# Patient Record
Sex: FEMALE | Race: WHITE | Employment: FULL TIME | URBAN - METROPOLITAN AREA
[De-identification: names, ages, dates, MRNs, and addresses within clinical notes are randomized per-mention and may not be internally consistent; named-entity substitution may affect disease eponyms.]

---

## 2017-01-02 DIAGNOSIS — I10 HYPERTENSION GOAL BP (BLOOD PRESSURE) < 140/90: ICD-10-CM

## 2017-01-02 DIAGNOSIS — Z11.59 NEED FOR HEPATITIS C SCREENING TEST: ICD-10-CM

## 2017-01-02 DIAGNOSIS — E78.5 HYPERLIPIDEMIA LDL GOAL <160: ICD-10-CM

## 2017-01-02 LAB
ANION GAP SERPL CALCULATED.3IONS-SCNC: 10 MMOL/L (ref 3–14)
BUN SERPL-MCNC: 12 MG/DL (ref 7–30)
CALCIUM SERPL-MCNC: 9.6 MG/DL (ref 8.5–10.1)
CHLORIDE SERPL-SCNC: 101 MMOL/L (ref 94–109)
CHOLEST SERPL-MCNC: 275 MG/DL
CO2 SERPL-SCNC: 28 MMOL/L (ref 20–32)
CREAT SERPL-MCNC: 0.77 MG/DL (ref 0.52–1.04)
GFR SERPL CREATININE-BSD FRML MDRD: 79 ML/MIN/1.7M2
GLUCOSE SERPL-MCNC: 125 MG/DL (ref 70–99)
HCV AB SERPL QL IA: NORMAL
HDLC SERPL-MCNC: 75 MG/DL
LDLC SERPL CALC-MCNC: 146 MG/DL
NONHDLC SERPL-MCNC: 200 MG/DL
POTASSIUM SERPL-SCNC: 3.8 MMOL/L (ref 3.4–5.3)
SODIUM SERPL-SCNC: 139 MMOL/L (ref 133–144)
TRIGL SERPL-MCNC: 272 MG/DL

## 2017-01-02 PROCEDURE — 80048 BASIC METABOLIC PNL TOTAL CA: CPT | Performed by: FAMILY MEDICINE

## 2017-01-02 PROCEDURE — 86803 HEPATITIS C AB TEST: CPT | Mod: 90 | Performed by: FAMILY MEDICINE

## 2017-01-02 PROCEDURE — 36415 COLL VENOUS BLD VENIPUNCTURE: CPT | Performed by: FAMILY MEDICINE

## 2017-01-02 PROCEDURE — 99000 SPECIMEN HANDLING OFFICE-LAB: CPT | Performed by: FAMILY MEDICINE

## 2017-01-02 PROCEDURE — 80061 LIPID PANEL: CPT | Performed by: FAMILY MEDICINE

## 2017-01-09 ENCOUNTER — OFFICE VISIT (OUTPATIENT)
Dept: FAMILY MEDICINE | Facility: CLINIC | Age: 52
End: 2017-01-09
Payer: COMMERCIAL

## 2017-01-09 ENCOUNTER — TELEPHONE (OUTPATIENT)
Dept: FAMILY MEDICINE | Facility: CLINIC | Age: 52
End: 2017-01-09

## 2017-01-09 VITALS
WEIGHT: 226 LBS | BODY MASS INDEX: 34.25 KG/M2 | DIASTOLIC BLOOD PRESSURE: 80 MMHG | SYSTOLIC BLOOD PRESSURE: 130 MMHG | HEIGHT: 68 IN | HEART RATE: 79 BPM | TEMPERATURE: 97.2 F | OXYGEN SATURATION: 95 %

## 2017-01-09 DIAGNOSIS — Z00.00 ROUTINE GENERAL MEDICAL EXAMINATION AT A HEALTH CARE FACILITY: Primary | ICD-10-CM

## 2017-01-09 DIAGNOSIS — R93.89 ENDOMETRIAL THICKENING ON ULTRA SOUND: ICD-10-CM

## 2017-01-09 DIAGNOSIS — R74.8 ELEVATED LIVER ENZYMES: ICD-10-CM

## 2017-01-09 DIAGNOSIS — J45.30 MILD PERSISTENT ASTHMA WITHOUT COMPLICATION: ICD-10-CM

## 2017-01-09 DIAGNOSIS — I10 HYPERTENSION GOAL BP (BLOOD PRESSURE) < 140/90: ICD-10-CM

## 2017-01-09 DIAGNOSIS — Z12.11 SCREEN FOR COLON CANCER: Primary | ICD-10-CM

## 2017-01-09 PROCEDURE — 99396 PREV VISIT EST AGE 40-64: CPT | Performed by: FAMILY MEDICINE

## 2017-01-09 RX ORDER — AMLODIPINE BESYLATE 5 MG/1
5 TABLET ORAL DAILY
Qty: 90 TABLET | Refills: 1 | Status: SHIPPED | OUTPATIENT
Start: 2017-01-09 | End: 2017-07-08

## 2017-01-09 RX ORDER — ATENOLOL AND CHLORTHALIDONE TABLET 50; 25 MG/1; MG/1
1 TABLET ORAL DAILY
Qty: 90 TABLET | Refills: 1 | Status: SHIPPED | OUTPATIENT
Start: 2017-01-09 | End: 2017-07-08

## 2017-01-09 NOTE — PATIENT INSTRUCTIONS
1. I recommend including veggies, fresh fruits (3 to 5 servings), nuts (unsalted) in your daily diet. Cut down on carbs like bread, pasta, rice, potatoes. Cut down on red meats like burgers etc. Increase healthy proteins like beans, tofu, tuna, chicken and turkey.    2. Get regular exercise like jogging, biking, swimming at least 3 times per week.      3. Do self breast exams monthly. Report any lumps.    4. Avoid the sun or otherwise use sun screen - please look at the insert I gave you about melanoma.    5. See the dentist and eye doctor regularly.     6. Let's combine the Atenolol and HCTZ into one pill. Increase the Amlodipine to 5 mg daily.    7. Make a lab only appointment for liver enzymes - otherwise we can do this in 6 months when you come in for a follow-up with pre-visit labs.

## 2017-01-09 NOTE — NURSING NOTE
"Chief Complaint   Patient presents with     Physical       Initial /94 mmHg  Pulse 79  Temp(Src) 97.2  F (36.2  C) (Oral)  Ht 5' 7.75\" (1.721 m)  Wt 226 lb (102.513 kg)  BMI 34.61 kg/m2  SpO2 95% Estimated body mass index is 34.61 kg/(m^2) as calculated from the following:    Height as of this encounter: 5' 7.75\" (1.721 m).    Weight as of this encounter: 226 lb (102.513 kg)..  BP completed using cuff size: deondre [place] FÉLIX    "

## 2017-01-09 NOTE — PROGRESS NOTES
SUBJECTIVE:     CC: Pam Mora is an 51 year old woman who presents for preventive health visit.     Abdomina bloating, elevated liver enzymes due to fatty liver - this is chronic. She denies pain but there is discomfort. Fullness after eating small amounts. She sometimes has diarrhea mild. But generally normal BM's 1-2 per day. No nausea or vomiting. No fevers or weight loss.   Evaluation and treatment:   ultrasound 6/5/14 showing fatty liver and also two spots on the liver.    MRI on 6/11/14 showed those spots to be sparing of fatty liver.    Another ultrasound 11/7/14 to evaluate the chest pain and elevated liver enzymes, similar findings.   Referred previously to GI. She feels she did not get benefit from that.    EGD and colonoscopy ordered but she never got it done.    Thickened endometrium and ovarian cyst -   Evaluation and treatment:   U/S as below on 6/5/14   Endometrial bx as below 7/16/14   Saw GYN on 10/13/14 but only hot flushes discussed       PELVIC ULTRASOUND COMPLETE WITH TRANSVAGINAL IMAGING 6/5/2014, 9:57 AM  HISTORY: Flatulence, eructation, and gas pain.  COMPARISON: None.  FINDINGS: Transabdominal and transvaginal imaging was performed.  Transvaginal imaging was performed to better visualize the endometrium  and adnexa. Uterus is retroverted and normal in size measuring 9.6 x  6.8 x 5.7 cm. The endometrium measures 1.2 cm in thickness. The right  ovary is normal in size and appearance. There is a cyst in the left  ovary measuring 4.0 x 3.4 x 3.2 cm. Color Doppler and Doppler waveform  analysis of the ovary shows blood flow bilaterally. No torsion. There  is small amount of free fluid in the pelvis.  IMPRESSION  IMPRESSION:  1. The endometrium is abnormally thickened for a postmenopausal woman  measuring 1.2 cm.   2. There is a 4.0 cm simple cyst in the left ovary, abnormal in a  postmenopausal woman. Followup in 3-6 months recommended.  FELICIA ASENCIO MD    Patient Name: SARAH  SANDRA YANCEY   MR#: [id number]   Specimen #: [id number]   Collected: 7/16/2014   Received: 7/16/2014   Reported: 7/17/2014 06:32   Ordering Phy(s): ENDER POLANCO               SPECIMEN(S):   Endometrial biopsy     FINAL DIAGNOSIS:   Endometrial biopsy:         - Inactive atrophic endometrium with stromal breakdown (see   comment)     COMMENT:   Although there is no evidence of hyperplasia in the planes examined,   there are no good-sized tissue fragments with intact glands and stroma   to adequately assess for hyperplasia.  There is no evidence of atypia or   malignancy.  Should the bleeding persist, especially if the patient has   risk factors for hyperplasia, repeat biopsy may be recommended.     Chest pain - I had ordered stress test but since the pain resolved she did not set it up.    HTN - not checking at home. Controlled in clinic.  Treatment:   Atenolol 50 mg qd   Lisinopril caused cough   HCTZ 25 mg qd.    Norvasc 5 mg qd    Last Basic Metabolic Panel:  NA      139   1/2/2017   POTASSIUM      3.8   1/2/2017  CHLORIDE      101   1/2/2017  BRIELLE      9.6   1/2/2017  CO2       28   1/2/2017  BUN       12   1/2/2017  CR     0.77   1/2/2017  GLC      125   1/2/2017    CBC RESULTS:   Recent Labs   Lab Test  11/20/15   0855   WBC  4.7   RBC  4.62   HGB  14.5   HCT  43.2   MCV  94   MCH  31.4   MCHC  33.6   RDW  12.9   PLT  218     Dyslipidemia - No h/o CAD, CVA, PAD or diabetes.  Treatment:   Stopped Zocor for no specicif reason but based on ATP4, statin not needed anyway.    Recent Labs   Lab Test  01/02/17   0745  11/20/15   0855  10/22/14   0850  01/13/14   0851   CHOL  275*  216*  215*  252*   HDL  75  73  73  66   LDL  146*  114*  112  168*   TRIG  272*  144  151*  88   CHOLHDLRATIO   --    --   2.9  3.8     The 10-year ASCVD risk score (Van CRAMER Jr., et al., 2013) is: 2.4%    Values used to calculate the score:      Age: 51 years      Sex: Female      Is an : No      Diabetic: No      Tobacco  smoker: No      Systolic Blood Pressure: 143 mmHg      Prescribed Antihypertensives: Yes      HDL Cholesterol: 75 mg/dL      Total Cholesterol: 275 mg/dL       Obesity - has seen nutritionist previously. Has previously  followed with obesity clinic. Tries to eat healthy.   TSH     Date   Value   Range   Status     5/15/2014   2.95    0.4 - 5.0 mU/L   Final       Wt Readings from Last 5 Encounters:   01/09/17 226 lb (102.513 kg)   04/07/16 240 lb 9.6 oz (109.135 kg)   11/20/15 240 lb (108.863 kg)   07/07/15 239 lb (108.41 kg)   04/22/15 237 lb (107.502 kg)     Mild persistent asthma - Previously on Flovent. Now stable on Alb prn.     ACT Total Scores 1/9/2017   ACT TOTAL SCORE -   ASTHMA ER VISITS -   ASTHMA HOSPITALIZATIONS -   ACT TOTAL SCORE (Goal Greater than or Equal to 20) 22   In the past 12 months, how many times did you visit the emergency room for your asthma without being admitted to the hospital? 0   In the past 12 months, how many times were you hospitalized overnight because of your asthma? 0     Migraines - has not had a flare since 2013. Previously on Topamax but not any more.      Lung nodule - Had 3 mm lung nodule in 2007. She denies cough, shortness of breath or other respiratory symptoms. She has previous h/o smoking. Follow up CT as below - no further evaluation needed.  CT 11/5/13  IMPRESSION:   1. The indeterminate left lung base nodule is redemonstrated on image  39. Currently measured as 0.4 cm. This is within the range of accurate  caliper placement. There is no significant interval change seen.  2. Small calcification along the anterior left lobe of thyroid.  3. No acute pulmonary disease.    Thyroid calcification - as above on CT noted incidentally. TSH 4/25/13 normal. Exam is normal today. U/S showed thyroid nodule. She saw endocrine and was told the bx was normal.    Skin lumps on abd -     ULTRASOUND ABDOMEN LIMITED July 10, 2015 8:31 AM  HISTORY: Palpable subcutaneous masses in the  right abdomen.  FINDINGS: Ultrasound was targeted to the palpable masses in the right  upper quadrant. Each of these correlates with a superficial  circumscribed hyperechoic nodule. No internal vascularity is  demonstrated. The first measures 1.4 x 1.8 x 1.2 cm. The second  measures 1.5 x 1.9 x 0.9 cm.   IMPRESSION  IMPRESSION: Findings suggest that the palpable nodules represent  lipomas.  CECI HOLLY MD    Tobacco abuse - Has 23 pack history of smoking. Quit in 2007.     Preventive -   Mammogram 3/18/16 neg.   Declines Tdap, Prevnar, Pneumovax and flu shot.  PAP - declines  Hep C screen: negative 1/2/17    SH:    Marital status:   Kids: 3  Employment: with [employer]  Exercise: 21 day fix, using video tapes, cardio and weight, 30 min per day, until Nov, Since then walking  Tobacco: per HPI  Etoh: 2-3 per week, 2 at a time  Recreational drugs: no  Caffeine: coffee one cup per day    FH:    High cholesterol dad.       Today's PHQ-2 Score:   PHQ-2 ( 1999 Pfizer) 1/9/2017 4/7/2016   Q1: Little interest or pleasure in doing things 0 0   Q2: Feeling down, depressed or hopeless 0 0   PHQ-2 Score 0 0   Little interest or pleasure in doing things - -   Feeling down, depressed or hopeless - -   PHQ-2 Score - -       Abuse: Current or Past(Physical, Sexual or Emotional)- No  Do you feel safe in your environment - Yes    Social History   Substance Use Topics     Smoking status: Former Smoker     Quit date: 01/06/2008     Smokeless tobacco: Never Used     Alcohol Use: Yes      Comment: 3 drinks a week     The patient does not drink >3 drinks per day nor >7 drinks per week.    Recent Labs   Lab Test  01/02/17   0745  11/20/15   0855  10/22/14   0850  01/13/14   0851   CHOL  275*  216*  215*  252*   HDL  75  73  73  66   LDL  146*  114*  112  168*   TRIG  272*  144  151*  88   CHOLHDLRATIO   --    --   2.9  3.8   NHDL  200*  143*   --    --        Reviewed orders with patient.  Reviewed health maintenance and  "updated orders accordingly - Yes    ROS:  C: NEGATIVE for fever, chills, change in weight  I: NEGATIVE for worrisome rashes, moles or lesions  E: NEGATIVE for vision changes or irritation  ENT: NEGATIVE for ear, mouth and throat problems  R: NEGATIVE for significant cough or SOB  B: NEGATIVE for masses, tenderness or discharge  CV: NEGATIVE for chest pain, palpitations or peripheral edema  GI: NEGATIVE for nausea, abdominal pain, heartburn, or change in bowel habits  : NEGATIVE for unusual urinary or vaginal symptoms. No vaginal bleeding.  M: NEGATIVE for significant arthralgias or myalgia  N: NEGATIVE for weakness, dizziness or paresthesias  P: NEGATIVE for changes in mood or affect       OBJECTIVE:     /80 mmHg  Pulse 79  Temp(Src) 97.2  F (36.2  C) (Oral)  Ht 5' 7.75\" (1.721 m)  Wt 226 lb (102.513 kg)  BMI 34.61 kg/m2  SpO2 95%  EXAM:  GENERAL: healthy, alert and no distress  EYES: Eyes grossly normal to inspection, PERRL and conjunctivae and sclerae normal  HENT: ear canals and TM's normal, nose and mouth without ulcers or lesions  NECK: no adenopathy, no asymmetry, masses, or scars and thyroid normal to palpation  RESP: lungs clear to auscultation - no rales, rhonchi or wheezes  BREAST: declines  CV: regular rate and rhythm, normal S1 S2, no S3 or S4, no murmur, click or rub, no peripheral edema and peripheral pulses strong  ABDOMEN: soft, nontender, no hepatosplenomegaly, no masses and bowel sounds normal   (female): declines  MS: no gross musculoskeletal defects noted, no edema  SKIN: elected to remain fully clothed. The exposed areas were normal.  NEURO: Normal strength and tone, mentation intact and speech normal  PSYCH: mentation appears normal, affect normal/bright    ASSESSMENT/PLAN:       COUNSELING:   Reviewed preventive health counseling, as reflected in patient instructions       Regular exercise       Healthy diet/nutrition     reports that she quit smoking about 9 years ago. She has " "never used smokeless tobacco.    Estimated body mass index is 37.24 kg/(m^2) as calculated from the following:    Height as of 4/7/16: 5' 7.42\" (1.712 m).    Weight as of 4/7/16: 240 lb 9.6 oz (109.135 kg).   Weight management plan: Discussed healthy diet and exercise guidelines and patient will follow up in 12 months in clinic to re-evaluate.    Assessment and Plan - Decision Making    1. Routine general medical examination at a health care facility    Results of today's visit given to patient verbally and in writing. Various preventive issues discussed as below. Patient verbalized understanding.    - vaccine update: declines  - STD screen: declines  - Mammogram: 3/8/16 negative  - PAP: declines  - Colon CA screen: Colonoscopy ordered today    - Hep C screen: negative 1/2/17    - Lung cancer screen: does not qualify      2. Hypertension goal BP (blood pressure) < 140/90  Controlled. Change Atenolol and HCTZ to Atenolol/chlorthalidone. Continue Norvasc.   - atenolol-chlorthalidone (TENORETIC 50) 50-25 MG per tablet; Take 1 tablet by mouth daily  Dispense: 90 tablet; Refill: 1  - amLODIPine (NORVASC) 5 MG tablet; Take 1 tablet (5 mg) by mouth daily  Dispense: 90 tablet; Refill: 1    3. Elevated liver enzymes  Related to fatty liver. She wanted repeat LFT's. She will set that up.  - Hepatic panel (Albumin, ALT, AST, Bili, Alk Phos, TP); Future    4. Mild persistent asthma without complication  Controlled on Alb prn.  - Asthma Action Plan (AAP)      Written instructions given as follows:    Patient Instructions   1. I recommend including veggies, fresh fruits (3 to 5 servings), nuts (unsalted) in your daily diet. Cut down on carbs like bread, pasta, rice, potatoes. Cut down on red meats like burgers etc. Increase healthy proteins like beans, tofu, tuna, chicken and turkey.    2. Get regular exercise like jogging, biking, swimming at least 3 times per week.      3. Do self breast exams monthly. Report any lumps.    4. " Avoid the sun or otherwise use sun screen - please look at the insert I gave you about melanoma.    5. See the dentist and eye doctor regularly.     6. Let's combine the Atenolol and HCTZ into one pill. Increase the Amlodipine to 5 mg daily.    7. Make a lab only appointment for liver enzymes - otherwise we can do this in 6 months when you come in for a follow-up with pre-visit labs.

## 2017-01-09 NOTE — MR AVS SNAPSHOT
After Visit Summary   1/9/2017    Pam Mora    MRN: [id number]           Patient Information     Date Of Birth          1965        Visit Information        Provider Department      1/9/2017 7:30 AM Franki Eason MD [facility]        Today's Diagnoses     Hypertension goal BP (blood pressure) < 140/90    -  1     Elevated liver enzymes           Care Instructions    1. I recommend including veggies, fresh fruits (3 to 5 servings), nuts (unsalted) in your daily diet. Cut down on carbs like bread, pasta, rice, potatoes. Cut down on red meats like burgers etc. Increase healthy proteins like beans, tofu, tuna, chicken and turkey.    2. Get regular exercise like jogging, biking, swimming at least 3 times per week.      3. Do self breast exams monthly. Report any lumps.    4. Avoid the sun or otherwise use sun screen - please look at the insert I gave you about melanoma.    5. See the dentist and eye doctor regularly.     6. Let's combine the Atenolol and HCTZ into one pill. Increase the Amlodipine to 5 mg daily.    7. Make a lab only appointment for liver enzymes - otherwise we can do this in 6 months when you come in for a follow-up with pre-visit labs.                Follow-ups after your visit        Future tests that were ordered for you today     Open Future Orders        Priority Expected Expires Ordered    Hepatic panel (Albumin, ALT, AST, Bili, Alk Phos, TP) Routine  1/9/2018 1/9/2017            Who to contact     If you have questions or need follow up information about today's clinic visit or your schedule please contact [facility] directly at [phone].  Normal or non-critical lab and imaging results will be communicated to you by MyChart, letter or phone within 4 business days after the clinic has received the results. If you do not hear from us within 7 days, please contact the clinic through MyChart or phone. If you have a critical or abnormal  "lab result, we will notify you by phone as soon as possible.  Submit refill requests through [employer] or call your pharmacy and they will forward the refill request to us. Please allow 3 business days for your refill to be completed.          Additional Information About Your Visit        [employer] Information     [employer] gives you secure access to your electronic health record. If you see a primary care provider, you can also send messages to your care team and make appointments. If you have questions, please call your primary care clinic.  If you do not have a primary care provider, please call [phone] and they will assist you.        Care EveryWhere ID     This is your Care EveryWhere ID. This could be used by other organizations to access your [place] medical records  [id number]        Your Vitals Were     Pulse Temperature Height BMI (Body Mass Index) Pulse Oximetry       79 97.2  F (36.2  C) (Oral) 5' 7.75\" (1.721 m) 34.61 kg/m2 95%        Blood Pressure from Last 3 Encounters:   01/09/17 130/80   04/07/16 130/84   11/20/15 136/88    Weight from Last 3 Encounters:   01/09/17 226 lb (102.513 kg)   04/07/16 240 lb 9.6 oz (109.135 kg)   11/20/15 240 lb (108.863 kg)                 Today's Medication Changes          These changes are accurate as of: 1/9/17  8:10 AM.  If you have any questions, ask your nurse or doctor.               Start taking these medicines.        Dose/Directions    atenolol-chlorthalidone 50-25 MG per tablet   Commonly known as:  TENORETIC 50   Used for:  Hypertension goal BP (blood pressure) < 140/90   Started by:  Franki Eason MD        Dose:  1 tablet   Take 1 tablet by mouth daily   Quantity:  90 tablet   Refills:  1         These medicines have changed or have updated prescriptions.        Dose/Directions    amLODIPine 5 MG tablet   Commonly known as:  NORVASC   This may have changed:    - medication strength  - how much to take   Used for:  Hypertension goal BP (blood " pressure) < 140/90   Changed by:  Franki Eason MD        Dose:  5 mg   Take 1 tablet (5 mg) by mouth daily   Quantity:  90 tablet   Refills:  1         Stop taking these medicines if you haven't already. Please contact your care team if you have questions.     atenolol 50 MG tablet   Commonly known as:  TENORMIN   Stopped by:  Franki Eason MD           hydrochlorothiazide 25 MG tablet   Commonly known as:  HYDRODIURIL   Stopped by:  Franki Eason MD           simvastatin 10 MG tablet   Commonly known as:  ZOCOR   Stopped by:  Franki Eason MD                Where to get your medicines      These medications were sent to [facility] [id number] [place] [address], [facility] [address]  [address] [address] [address]     Phone:  [phone]    - amLODIPine 5 MG tablet  - atenolol-chlorthalidone 50-25 MG per tablet             Primary Care Provider Office Phone # Fax #    Franki Eason [phone]       [facility] [address] [facility] [address]        Thank you!     Thank you for choosing [facility]  for your care. Our goal is always to provide you with excellent care. Hearing back from our patients is one way we can continue to improve our services. Please take a few minutes to complete the written survey that you may receive in the mail after your visit with us. Thank you!             Your Updated Medication List - Protect others around you: Learn how to safely use, store and throw away your medicines at www.disposemymeds.org.          This list is accurate as of: 1/9/17  8:10 AM.  Always use your most recent med list.                   Brand Name Dispense Instructions for use    albuterol 108 (90 BASE) MCG/ACT Inhaler    PROAIR HFA/PROVENTIL HFA/VENTOLIN HFA    1 Inhaler    Inhale 2 puffs into the lungs every 4 hours as needed for shortness of breath / dyspnea       amLODIPine 5 MG tablet    NORVASC    90 tablet    Take 1  tablet (5 mg) by mouth daily       atenolol-chlorthalidone 50-25 MG per tablet    TENORETIC 50    90 tablet    Take 1 tablet by mouth daily       Multi-vitamin Tabs tablet   Generic drug:  multivitamin, therapeutic with minerals      1 TABLET DAILY       olopatadine HCl 0.2 % Soln    PATADAY    2.5 mL    Place 2 drops into both eyes daily

## 2017-01-09 NOTE — Clinical Note
My Asthma Action Plan  Name: Pam Mora   YOB: 1965  Date: 1/9/2017   My doctor: Franki Eason   My clinic: [facility]      My Control Medicine: n/a          My Rescue Medicine: Albuterol (Proair/Ventolin/Proventil) HFA        Dose: as needed   My Asthma Severity:           GREEN ZONE   Good Control    I feel good    No cough or wheeze    Can work, sleep and play without asthma symptoms       Take your asthma control medicine every day.     1. If exercise triggers your asthma, take your rescue medication    15 minutes before exercise or sports, and    During exercise if you have asthma symptoms  2. Spacer to use with inhaler: If you have a spacer, make sure to use it with your inhaler             YELLOW ZONE Getting Worse  I have ANY of these:    I do not feel good    Cough or wheeze    Chest feels tight    Wake up at night   1. Keep taking your Green Zone medications  2. Start taking your rescue medicine:    every 20 minutes for up to 1 hour. Then every 4 hours for 24-48 hours.  3. If you stay in the Yellow Zone for more than 12-24 hours, contact your doctor.  4. If you do not return to the Green Zone in 12-24 hours or you get worse, start taking your oral steroid medicine if prescribed by your provider.           RED ZONE Medical Alert - Get Help  I have ANY of these:    I feel awful    Medicine is not helping    Breathing getting harder    Trouble walking or talking    Nose opens wide to breathe       1. Take your rescue medicine NOW  2. If your provider has prescribed an oral steroid medicine, start taking it NOW  3. Call your doctor NOW  4. If you are still in the Red Zone after 20 minutes and you have not reached your doctor:    Take your rescue medicine again and    Call 911 or go to the emergency room right away    See your regular doctor within 2 weeks of an Emergency Room or Urgent Care visit for follow-up treatment.            Electronically signed by: FRANKI  ASIF, January 9, 2017    Annual Reminders:  Meet with Asthma Educator,  Flu Shot in the Fall, consider Pneumonia Vaccination for patients with asthma (aged 19 and older).    Pharmacy:    [facility] [place] [place] - [address], [address]                    Asthma Triggers  How To Control Things That Make Your Asthma Worse    Triggers are things that make your asthma worse.  Look at the list below to help you find your triggers and what you can do about them.  You can help prevent asthma flare-ups by staying away from your triggers.      Trigger                                                          What you can do   Cigarette Smoke  Tobacco smoke can make asthma worse. Do not allow smoking in your home, car or around you.  Be sure no one smokes at a child s day care or school.  If you smoke, ask your health care provider for ways to help you quit.  Ask family members to quit too.  Ask your health care provider for a referral to Quit Plan to help you quit smoking, or call 5-677-734-PLAN.     Colds, Flu, Bronchitis  These are common triggers of asthma. Wash your hands often.  Don t touch your eyes, nose or mouth.  Get a flu shot every year.     Dust Mites  These are tiny bugs that live in cloth or carpet. They are too small to see. Wash sheets and blankets in hot water every week.   Encase pillows and mattress in dust mite proof covers.  Avoid having carpet if you can. If you have carpet, vacuum weekly.   Use a dust mask and HEPA vacuum.   Pollen and Outdoor Mold  Some people are allergic to trees, grass, or weed pollen, or molds. Try to keep your windows closed.  Limit time out doors when pollen count is high.   Ask you health care provider about taking medicine during allergy season.     Animal Dander  Some people are allergic to skin flakes, urine or saliva from pets with fur or feathers. Keep pets with fur or feathers out of your home.    If you can t keep  the pet outdoors, then keep the pet out of your bedroom.  Keep the bedroom door closed.  Keep pets off cloth furniture and away from stuffed toys.     Mice, Rats, and Cockroaches  Some people are allergic to the waste from these pests.   Cover food and garbage.  Clean up spills and food crumbs.  Store grease in the refrigerator.   Keep food out of the bedroom.   Indoor Mold  This can be a trigger if your home has high moisture. Fix leaking faucets, pipes, or other sources of water.   Clean moldy surfaces.  Dehumidify basement if it is damp and smelly.   Smoke, Strong Odors, and Sprays  These can reduce air quality. Stay away from strong odors and sprays, such as perfume, powder, hair spray, paints, smoke incense, paint, cleaning products, candles and new carpet.   Exercise or Sports  Some people with asthma have this trigger. Be active!  Ask your doctor about taking medicine before sports or exercise to prevent symptoms.    Warm up for 5-10 minutes before and after sports or exercise.     Other Triggers of Asthma  Cold air:  Cover your nose and mouth with a scarf.  Sometimes laughing or crying can be a trigger.  Some medicines and food can trigger asthma.

## 2017-01-10 ASSESSMENT — ASTHMA QUESTIONNAIRES: ACT_TOTALSCORE: 22

## 2017-01-10 NOTE — TELEPHONE ENCOUNTER
Please call patient:     1. I did not talk to her about colonoscopy. Please have her call [phone] to set it up.   2. I was looking through her records. The endometrial thickening in 2014 was not followed up. Please have her see Dr. De La Garza.    Thanks.    Franki Eason M.D.

## 2017-01-10 NOTE — TELEPHONE ENCOUNTER
1. Thanks about the ultrasound follow-up - I had not seen that.    2. I changed the Atenolol and HCTZ in to one pill for convenience for you. HCTZ and Chlorthalidone are in the same category.    3. BP was 143/94 on initial measurement and went down on the second measurement. So I decided to increase the Norvasc to 5 mg to improve the BP further. The previous measurements were also borderline high.     BP Readings from Last 3 Encounters:   01/09/17 130/80   04/07/16 130/84   11/20/15 136/88     4. Give her the phone number to get the colonoscopy done at [facility]. Place referral for me please.    Franki Eason M.D.

## 2017-01-10 NOTE — TELEPHONE ENCOUNTER
Patient called back. She did have a F/U U/S 6/11/15-    ULTRASOUND PELVIS WITH TRANSVAGINAL IMAGING   6/11/2015 8:40 AM       HISTORY: Ovarian cyst.     COMPARISON: 6/5/2014.     TECHNIQUE: Endovaginal imaging was also performed to better evaluate  the ovaries.     FINDINGS: The uterus is retroflexed measuring 9.1 x 4.6 x 5.7 cm in  long axis, short axis and transverse dimensions, respectively. No  myometrial masses. The endometrial stripe measures 0.7 cm in  thickness. The right and left ovaries measure 3.0 x 1.4 x 1.1 cm and  3.0 x 2.1 x 1.9 cm, respectively. The right ovary is unremarkable. A  2.1 x 1.5 x 1.4 cm simple cyst is present in the left ovary. No  adnexal masses. No free fluid in the pelvis.       IMPRESSION  IMPRESSION:    1. Nonspecific 2.1 x 1.5 x 1.4 cm simple cyst in the left ovary. On  the comparison study dated 6/5/2014, a 4.0 x 3.4 x 3.2 cm simple cyst  was present in the left ovary.  2. Otherwise, unremarkable appearance of the uterus and ovaries.     BELLE ELLISON MD    Patient would like to go to [facility] for her colonoscopy.    Patient has questions about her recent medication list. Pharmacy said that the new medication atenolol-chlorthalidone is a different diuretic than she was on before. Also, patient is wondering why you changed her amlodipine from 2.5 mg to 5 mg when her BP was good. Please advise.Beba Almaraz MA/KARTHIK

## 2017-02-08 ENCOUNTER — OFFICE VISIT (OUTPATIENT)
Dept: FAMILY MEDICINE | Facility: CLINIC | Age: 52
End: 2017-02-08
Payer: COMMERCIAL

## 2017-02-08 VITALS
WEIGHT: 231 LBS | TEMPERATURE: 96.8 F | BODY MASS INDEX: 35.38 KG/M2 | HEART RATE: 67 BPM | DIASTOLIC BLOOD PRESSURE: 82 MMHG | SYSTOLIC BLOOD PRESSURE: 126 MMHG

## 2017-02-08 DIAGNOSIS — G43.711 INTRACTABLE CHRONIC MIGRAINE WITHOUT AURA AND WITH STATUS MIGRAINOSUS: Primary | ICD-10-CM

## 2017-02-08 PROCEDURE — 99213 OFFICE O/P EST LOW 20 MIN: CPT | Performed by: FAMILY MEDICINE

## 2017-02-08 NOTE — NURSING NOTE
"Chief Complaint   Patient presents with     Headache       Initial /82 mmHg  Pulse 67  Temp(Src) 96.8  F (36  C) (Oral)  Wt 231 lb (104.781 kg) Estimated body mass index is 35.38 kg/(m^2) as calculated from the following:    Height as of 1/9/17: 5' 7.75\" (1.721 m).    Weight as of this encounter: 231 lb (104.781 kg).  BP completed using cuff size: deondre Anderson MA    "

## 2017-02-08 NOTE — MR AVS SNAPSHOT
After Visit Summary   2/8/2017    Pam Mora    MRN: [id number]           Patient Information     Date Of Birth          1965        Visit Information        Provider Department      2/8/2017 8:15 AM Singh Vargas MD [facility]        Today's Diagnoses     Intractable chronic migraine without aura and with status migrainosus    -  1        Follow-ups after your visit        Future tests that were ordered for you today     Open Future Orders        Priority Expected Expires Ordered    MR Brain w/o & w Contrast Routine  2/8/2018 2/8/2017            Who to contact     If you have questions or need follow up information about today's clinic visit or your schedule please contact [facility] directly at [phone].  Normal or non-critical lab and imaging results will be communicated to you by MyChart, letter or phone within 4 business days after the clinic has received the results. If you do not hear from us within 7 days, please contact the clinic through [employer] or phone. If you have a critical or abnormal lab result, we will notify you by phone as soon as possible.  Submit refill requests through [employer] or call your pharmacy and they will forward the refill request to us. Please allow 3 business days for your refill to be completed.          Additional Information About Your Visit        MyChart Information     [employer] gives you secure access to your electronic health record. If you see a primary care provider, you can also send messages to your care team and make appointments. If you have questions, please call your primary care clinic.  If you do not have a primary care provider, please call [phone] and they will assist you.        Care EveryWhere ID     This is your Care EveryWhere ID. This could be used by other organizations to access your [place] medical records  [id number]        Your Vitals Were     Pulse Temperature                67  96.8  F (36  C) (Oral)           Blood Pressure from Last 3 Encounters:   02/08/17 126/82   01/09/17 130/80   04/07/16 130/84    Weight from Last 3 Encounters:   02/08/17 231 lb (104.781 kg)   01/09/17 226 lb (102.513 kg)   04/07/16 240 lb 9.6 oz (109.135 kg)                 Today's Medication Changes          These changes are accurate as of: 2/8/17  8:44 AM.  If you have any questions, ask your nurse or doctor.               Stop taking these medicines if you haven't already. Please contact your care team if you have questions.     olopatadine HCl 0.2 % Soln   Commonly known as:  PATADAY   Stopped by:  Singh Vargas MD                    Primary Care Provider Office Phone # Fax #    Franki Eason [phone]       [facility] [id number] [place] [id number]        Thank you!     Thank you for choosing [facility]  for your care. Our goal is always to provide you with excellent care. Hearing back from our patients is one way we can continue to improve our services. Please take a few minutes to complete the written survey that you may receive in the mail after your visit with us. Thank you!             Your Updated Medication List - Protect others around you: Learn how to safely use, store and throw away your medicines at www.disposemymeds.org.          This list is accurate as of: 2/8/17  8:44 AM.  Always use your most recent med list.                   Brand Name Dispense Instructions for use    albuterol 108 (90 BASE) MCG/ACT Inhaler    PROAIR HFA/PROVENTIL HFA/VENTOLIN HFA    1 Inhaler    Inhale 2 puffs into the lungs every 4 hours as needed for shortness of breath / dyspnea       amLODIPine 5 MG tablet    NORVASC    90 tablet    Take 1 tablet (5 mg) by mouth daily       atenolol-chlorthalidone 50-25 MG per tablet    TENORETIC 50    90 tablet    Take 1 tablet by mouth daily       Multi-vitamin Tabs tablet   Generic drug:  multivitamin, therapeutic with  minerals      1 TABLET DAILY

## 2017-02-08 NOTE — PROGRESS NOTES
SUBJECTIVE:  51 year old.The patient has a history of migraine.  This started one week ago. Location posterior or in the front quality pressure in her head Associated symptoms are light headed and dizzy.  Brought on by supine .  Better with ibuprofen. ROS no syncope, visual changes, numbness.  Has had a history of migraines that is usually relieved with ibuprofen.  Had had bad reaction to immitrex with nausea.  Reviewed health maintenance  Patient Active Problem List   Diagnosis     Rhinitis, allergic seasonal     GERD (gastroesophageal reflux disease)     LSIL (low grade squamous intraepithelial lesion) on Pap smear     Incidental lung nodule     Hypertension goal BP (blood pressure) < 140/90     Migraines     Hyperlipidemia LDL goal <160     Elevated fasting glucose     Obesity (BMI 30-39.9)     Abnormal thyroid scan     Menopause     Chest pain     Elevated liver enzymes     Mild persistent asthma without complication     Past Medical History   Diagnosis Date     Asthma      Persistant, mild     AR (allergic rhinitis)      GERD (gastroesophageal reflux disease)      Incidental lung nodule 05/07     LSIL (low grade squamous intraepithelial lesion) on Pap smear      HTN (hypertension)      High cholesterol      Migraine      Obesity (BMI 30-39.9)      Elevated fasting glucose        OBJECTIVE:  no apparent distress  /82 mmHg  Pulse 67  Temp(Src) 96.8  F (36  C) (Oral)  Wt 231 lb (104.781 kg)  PERRLA and EOM intact  CN 2-10 grossly intact  LUNGS:  CTA B/L, no wheezing or crackles.   Cardiovascular: negative, PMI normal. No lifts, heaves, or thrills. RRR. No murmurs, clicks gallops or rub   Gastrointestinal: Abdomen soft, non-tender. BS normal. No masses, organomegaly       ICD-10-CM    1. Intractable chronic migraine without aura and with status migrainosus G43.711 MR Brain w/o & w Contrast    PLAN: await MRI  Refused Toradol

## 2017-02-09 ENCOUNTER — TRANSFERRED RECORDS (OUTPATIENT)
Dept: HEALTH INFORMATION MANAGEMENT | Facility: CLINIC | Age: 52
End: 2017-02-09

## 2017-05-02 ENCOUNTER — TRANSFERRED RECORDS (OUTPATIENT)
Dept: HEALTH INFORMATION MANAGEMENT | Facility: CLINIC | Age: 52
End: 2017-05-02

## 2017-05-03 ENCOUNTER — OFFICE VISIT (OUTPATIENT)
Dept: FAMILY MEDICINE | Facility: CLINIC | Age: 52
End: 2017-05-03
Payer: COMMERCIAL

## 2017-05-03 VITALS
BODY MASS INDEX: 35.31 KG/M2 | WEIGHT: 233 LBS | OXYGEN SATURATION: 97 % | HEIGHT: 68 IN | DIASTOLIC BLOOD PRESSURE: 83 MMHG | HEART RATE: 75 BPM | TEMPERATURE: 98 F | SYSTOLIC BLOOD PRESSURE: 129 MMHG

## 2017-05-03 DIAGNOSIS — R07.89 ATYPICAL CHEST PAIN: Primary | ICD-10-CM

## 2017-05-03 PROCEDURE — 99214 OFFICE O/P EST MOD 30 MIN: CPT | Performed by: FAMILY MEDICINE

## 2017-05-03 NOTE — PROGRESS NOTES
HPI:    Pam is a 52 year old female here to discuss:    Atypical chest pain - has had this for many years off and on. It was located on the right upper chest but now on the left upper chest. Described as an ache, this is random and non exertional. Lasting only a few seconds, it does not radiate anywhere. Severity can be up to 5/10. It resolves with time but can get worse with deep breathing. Not associated with nausea, shortness of breath, dizziness, palpitations. She denies anxiety/stress. She has GERD for which she takes Omeprazole. Denies recreational drug use. Not sure about chest wall pain. Denies risk factors for PE like estrogen use, long car/plane ride, FH of blood clots.    Cardiac risk factors:     Previously known CAD: denies   Chol: high - on Zocor   HTN: yes   FH: no history of premature heart disease   Smoker: quit in    Diabetes: denies   Activity: walking 3 times per week   BMI: Body mass index is 35.69 kg/(m^2).   Age: 52    Previous evaluation:    EKG normal 10/22/14    CXR normal 10/22/14   Stress echo negative 05   Nuclear stress test 09 negative   I had ordered repeat stress test in  but she never got it done.   Now she wants to start an exercise program. Since a couple of people she knew  due to MI in their 50's, she is nervous.      .    Abdomina bloating, elevated liver enzymes due to fatty liver - this is chronic. She denies pain but there is discomfort. Fullness after eating small amounts. She sometimes has diarrhea mild. But generally normal BM's 1-2 per day. No nausea or vomiting. No fevers or weight loss.   Evaluation and treatment:   ultrasound 14 showing fatty liver and also two spots on the liver.    MRI on 14 showed those spots to be sparing of fatty liver.    Another ultrasound 14 to evaluate the chest pain and elevated liver enzymes, similar findings.   Has seen [facility] and EGD and colonoscopy were done? - but I don't see results.    Thickened  endometrium and ovarian cyst -   Evaluation and treatment:   U/S as below on 6/5/14   Endometrial bx as below 7/16/14   Saw GYN on 10/13/14 but only hot flushes discussed       PELVIC ULTRASOUND COMPLETE WITH TRANSVAGINAL IMAGING 6/5/2014, 9:57 AM  HISTORY: Flatulence, eructation, and gas pain.  COMPARISON: None.  FINDINGS: Transabdominal and transvaginal imaging was performed.  Transvaginal imaging was performed to better visualize the endometrium  and adnexa. Uterus is retroverted and normal in size measuring 9.6 x  6.8 x 5.7 cm. The endometrium measures 1.2 cm in thickness. The right  ovary is normal in size and appearance. There is a cyst in the left  ovary measuring 4.0 x 3.4 x 3.2 cm. Color Doppler and Doppler waveform  analysis of the ovary shows blood flow bilaterally. No torsion. There  is small amount of free fluid in the pelvis.  IMPRESSION  IMPRESSION:  1. The endometrium is abnormally thickened for a postmenopausal woman  measuring 1.2 cm.   2. There is a 4.0 cm simple cyst in the left ovary, abnormal in a  postmenopausal woman. Followup in 3-6 months recommended.  FELICIA ASENCIO MD    Patient Name: SANDRA SMITH   MR#: [id number]   Specimen #: [id number]   Collected: 7/16/2014   Received: 7/16/2014   Reported: 7/17/2014 06:32   Ordering Phy(s): ENDER POLANCO               SPECIMEN(S):   Endometrial biopsy     FINAL DIAGNOSIS:   Endometrial biopsy:         - Inactive atrophic endometrium with stromal breakdown (see   comment)     COMMENT:   Although there is no evidence of hyperplasia in the planes examined,   there are no good-sized tissue fragments with intact glands and stroma   to adequately assess for hyperplasia.  There is no evidence of atypia or   malignancy.  Should the bleeding persist, especially if the patient has   risk factors for hyperplasia, repeat biopsy may be recommended.     HTN - not checking at home. Controlled in clinic.  Treatment:   Atenolol 50 mg qd   Lisinopril  caused cough   HCTZ 25 mg qd.    Norvasc 5 mg qd    Last Basic Metabolic Panel:  NA      139   1/2/2017   POTASSIUM      3.8   1/2/2017  CHLORIDE      101   1/2/2017  BRIELLE      9.6   1/2/2017  CO2       28   1/2/2017  BUN       12   1/2/2017  CR     0.77   1/2/2017  GLC      125   1/2/2017    CBC RESULTS:   Recent Labs   Lab Test  11/20/15   0855   WBC  4.7   RBC  4.62   HGB  14.5   HCT  43.2   MCV  94   MCH  31.4   MCHC  33.6   RDW  12.9   PLT  218     Dyslipidemia - No h/o CAD, CVA, PAD or diabetes.  Treatment:   Stopped Zocor for no specicif reason but based on ATP4, statin not needed anyway.    Recent Labs   Lab Test  01/02/17   0745  11/20/15   0855  10/22/14   0850  01/13/14   0851   CHOL  275*  216*  215*  252*   HDL  75  73  73  66   LDL  146*  114*  112  168*   TRIG  272*  144  151*  88   CHOLHDLRATIO   --    --   2.9  3.8     The 10-year ASCVD risk score (Van SHOAIB Jr, et al., 2013) is: 2.1%    Values used to calculate the score:      Age: 52 years      Sex: Female      Is Non- : No      Diabetic: No      Tobacco smoker: No      Systolic Blood Pressure: 129 mmHg      Is BP treated: Yes      HDL Cholesterol: 75 mg/dL      Total Cholesterol: 275 mg/dL       Obesity - has seen nutritionist previously. Has previously  followed with obesity clinic. Tries to eat healthy.   TSH     Date   Value   Range   Status     5/15/2014   2.95    0.4 - 5.0 mU/L   Final       Wt Readings from Last 5 Encounters:   05/03/17 233 lb (105.7 kg)   02/08/17 231 lb (104.8 kg)   01/09/17 226 lb (102.5 kg)   04/07/16 240 lb 9.6 oz (109.1 kg)   11/20/15 240 lb (108.9 kg)     Mild persistent asthma - Previously on Flovent. Now stable on Alb prn.     ACT Total Scores 1/9/2017   ACT TOTAL SCORE -   ASTHMA ER VISITS -   ASTHMA HOSPITALIZATIONS -   ACT TOTAL SCORE (Goal Greater than or Equal to 20) 22   In the past 12 months, how many times did you visit the emergency room for your asthma without being admitted to the  hospital? 0   In the past 12 months, how many times were you hospitalized overnight because of your asthma? 0     Migraines - has not had a flare since 2013. Previously on Topamax but not any more.      Lung nodule - Had 3 mm lung nodule in 2007. She denies cough, shortness of breath or other respiratory symptoms. She has previous h/o smoking. Follow up CT as below - no further evaluation needed.  CT 11/5/13  IMPRESSION:   1. The indeterminate left lung base nodule is redemonstrated on image  39. Currently measured as 0.4 cm. This is within the range of accurate  caliper placement. There is no significant interval change seen.  2. Small calcification along the anterior left lobe of thyroid.  3. No acute pulmonary disease.    Thyroid calcification - as above on CT noted incidentally. TSH 4/25/13 normal. Exam is normal today. U/S showed thyroid nodule. She saw endocrine and was told the bx was normal.    Skin lumps on abd -     ULTRASOUND ABDOMEN LIMITED July 10, 2015 8:31 AM  HISTORY: Palpable subcutaneous masses in the right abdomen.  FINDINGS: Ultrasound was targeted to the palpable masses in the right  upper quadrant. Each of these correlates with a superficial  circumscribed hyperechoic nodule. No internal vascularity is  demonstrated. The first measures 1.4 x 1.8 x 1.2 cm. The second  measures 1.5 x 1.9 x 0.9 cm.   IMPRESSION  IMPRESSION: Findings suggest that the palpable nodules represent  lipomas.  CECI HOLLY MD    Tobacco abuse - Has 23 pack history of smoking. Quit in 2007.     Preventive -   Mammogram 3/18/16 neg.   Declines Tdap, Prevnar, Pneumovax and flu shot.  PAP - declines  Hep C screen: negative 1/2/17  Colon cancer screen: patient was scheduled with [facility], but didn't go to the appointment per [facility].    ROS:    Const: No fevers, weight changes or night sweats recently.  ENT: No runny nose, sore throat or ear pain.  Resp: No cough or shortness of breath.  CV: No dizziness or cardiac  "palpitations.  GI: No nausea, vomiting, diarrhea or constipation. Denies blood in stools or black stools.  : No dysuria, frequency or hematuria.    SH:    Marital status:   Kids: 3  Employment: with [employer]  Exercise: 21 day fix, using video tapes, cardio and weight, 30 min per day, until Nov, Since then walking  Tobacco: per HPI  Etoh: 2-3 per week, 2 at a time  Recreational drugs: no  Caffeine: coffee one cup per day    FH:    High cholesterol dad.     Exam:    /83  Pulse 75  Temp 98  F (36.7  C) (Oral)  Ht 5' 7.75\" (1.721 m)  Wt 233 lb (105.7 kg)  SpO2 97%  BMI 35.69 kg/m2    Gen: Healthy appearing female in no acute distress  Neck: No enlarged lymph nodes, thyromegally or other masses.  Lungs: Good air movement and otherwise clear.  CV: Heart RRR with no murmurs. No JVD, carotid bruits or leg edema.  MS: No tenderness over the chest wall at the location of her pain.  Abd: Soft, non tender, non distended, with normal bowel sounds. No liver or spleen enlargement. No masses. No hernias.  Psych: Affect is normal.     Assessment and Plan - Decision Making    1. Atypical chest pain  I had ordered repeat stress test in  but she never got it done.  Now she wants to start an exercise program. Since a couple of people she knew  due to MI in their 50's, she is nervous. Her symptoms are not typical of angina but given multiple risk factors, I ordered repeat stress echo.  - Exercise Stress Echocardiogram; Future      Written instructions given as follows:    Patient Instructions   Call [phone] to set up the stress test.    I will contact you with results.                "

## 2017-05-03 NOTE — NURSING NOTE
"Chief Complaint   Patient presents with     Patient Request     Questions regarding heart - starting new exercise program and wants to make sure everything is good.       Initial /83  Pulse 75  Temp 98  F (36.7  C) (Oral)  Ht 5' 7.75\" (1.721 m)  Wt 233 lb (105.7 kg)  SpO2 97%  BMI 35.69 kg/m2 Estimated body mass index is 35.69 kg/(m^2) as calculated from the following:    Height as of this encounter: 5' 7.75\" (1.721 m).    Weight as of this encounter: 233 lb (105.7 kg).  Medication Reconciliation: complete  Penny Villa M.A.    "

## 2017-05-03 NOTE — MR AVS SNAPSHOT
After Visit Summary   5/3/2017    Pam Mora    MRN: [id number]           Patient Information     Date Of Birth          1965        Visit Information        Provider Department      5/3/2017 1:30 PM Franki Eason MD [facility]        Today's Diagnoses     Atypical chest pain    -  1      Care Instructions    Call [phone] to set up the stress test.    I will contact you with results.              Follow-ups after your visit        Your next 10 appointments already scheduled     Jun 14, 2017  7:20 AM CDT   MyChart Short with Am[facility] Bynum MD   [facility]    [address] [place] [address] [phone]              Future tests that were ordered for you today     Open Future Orders        Priority Expected Expires Ordered    Exercise Stress Echocardiogram Routine  5/3/2018 5/3/2017            Who to contact     If you have questions or need follow up information about today's clinic visit or your schedule please contact [facility] directly at [phone].  Normal or non-critical lab and imaging results will be communicated to you by MyChart, letter or phone within 4 business days after the clinic has received the results. If you do not hear from us within 7 days, please contact the clinic through [employer] or phone. If you have a critical or abnormal lab result, we will notify you by phone as soon as possible.  Submit refill requests through [employer] or call your pharmacy and they will forward the refill request to us. Please allow 3 business days for your refill to be completed.          Additional Information About Your Visit        MyChart Information     [employer] gives you secure access to your electronic health record. If you see a primary care provider, you can also send messages to your care team and make appointments. If you have questions, please call your primary care  "clinic.  If you do not have a primary care provider, please call [phone] and they will assist you.        Care EveryWhere ID     This is your Care EveryWhere ID. This could be used by other organizations to access your [facility] medical records  [id number]        Your Vitals Were     Pulse Temperature Height Pulse Oximetry BMI (Body Mass Index)       75 98  F (36.7  C) (Oral) 5' 7.75\" (1.721 m) 97% 35.69 kg/m2        Blood Pressure from Last 3 Encounters:   05/03/17 129/83   02/08/17 126/82   01/09/17 130/80    Weight from Last 3 Encounters:   05/03/17 233 lb (105.7 kg)   02/08/17 231 lb (104.8 kg)   01/09/17 226 lb (102.5 kg)               Primary Care Provider Office Phone # Fax #    Franki Eason [phone]       [facility] [address] [facility] [address]        Thank you!     Thank you for choosing [facility]  for your care. Our goal is always to provide you with excellent care. Hearing back from our patients is one way we can continue to improve our services. Please take a few minutes to complete the written survey that you may receive in the mail after your visit with us. Thank you!             Your Updated Medication List - Protect others around you: Learn how to safely use, store and throw away your medicines at www.disposemymeds.org.          This list is accurate as of: 5/3/17  2:05 PM.  Always use your most recent med list.                   Brand Name Dispense Instructions for use    albuterol 108 (90 BASE) MCG/ACT Inhaler    PROAIR HFA/PROVENTIL HFA/VENTOLIN HFA    1 Inhaler    Inhale 2 puffs into the lungs every 4 hours as needed for shortness of breath / dyspnea       amLODIPine 5 MG tablet    NORVASC    90 tablet    Take 1 tablet (5 mg) by mouth daily       atenolol-chlorthalidone 50-25 MG per tablet    TENORETIC 50    90 tablet    Take 1 tablet by mouth daily       Multi-vitamin Tabs tablet   Generic drug:  multivitamin, therapeutic with " minerals      1 TABLET DAILY

## 2017-05-06 ENCOUNTER — TELEPHONE (OUTPATIENT)
Dept: FAMILY MEDICINE | Facility: CLINIC | Age: 52
End: 2017-05-06

## 2017-05-06 DIAGNOSIS — Z12.11 COLON CANCER SCREENING: Primary | ICD-10-CM

## 2017-05-07 NOTE — TELEPHONE ENCOUNTER
Tia,    Can we look for EGD and colonoscopy report from [facility].    Thanks.    Franki Eason M.D.

## 2017-05-08 NOTE — TELEPHONE ENCOUNTER
Called [facility] @ 10:39 AM.  I spoke with Rich in release of information.  He stated the patient has never had an EGD or colonoscopy with [facility].     He states patient is scheduled with [facility] to have a colonoscopy on 6-.      No EGD is scheduled.    Tia Monge LPN

## 2017-06-20 NOTE — TELEPHONE ENCOUNTER
Called [facility] @3:35 PM.  [facility] stated the patient was a no show and did not reschedule.    Tia Monge LPN

## 2017-06-24 ENCOUNTER — HEALTH MAINTENANCE LETTER (OUTPATIENT)
Age: 52
End: 2017-06-24

## 2017-07-08 DIAGNOSIS — I10 HYPERTENSION GOAL BP (BLOOD PRESSURE) < 140/90: ICD-10-CM

## 2017-07-10 RX ORDER — AMLODIPINE BESYLATE 5 MG/1
TABLET ORAL
Qty: 90 TABLET | Refills: 1 | Status: SHIPPED | OUTPATIENT
Start: 2017-07-10 | End: 2018-01-08

## 2017-07-10 RX ORDER — ATENOLOL AND CHLORTHALIDONE TABLET 50; 25 MG/1; MG/1
TABLET ORAL
Qty: 90 TABLET | Refills: 1 | Status: SHIPPED | OUTPATIENT
Start: 2017-07-10 | End: 2018-01-08

## 2017-10-20 DIAGNOSIS — J45.30 MILD PERSISTENT ASTHMA WITHOUT COMPLICATION: ICD-10-CM

## 2017-10-20 NOTE — LETTER
[facility]  [address] [facility] [address] [phone]    October 23, 2017    Pam Mora  [address] [facility] [address]    Dear Pam,       We recently received a refill request for VENTOLIN HFA Inhaler.  We have refilled this for a one time because you are due for a:    Asthma office visit      Please call at your earliest convenience so that there will not be a delay with your future refills.          Thank you,   Your [facility] Team/kati  [phone]

## 2017-10-23 RX ORDER — ALBUTEROL SULFATE 90 UG/1
AEROSOL, METERED RESPIRATORY (INHALATION)
Qty: 18 G | Refills: 0 | Status: SHIPPED | OUTPATIENT
Start: 2017-10-23 | End: 2017-12-22

## 2017-10-25 ENCOUNTER — RADIANT APPOINTMENT (OUTPATIENT)
Dept: CARDIOLOGY | Facility: CLINIC | Age: 52
End: 2017-10-25
Attending: FAMILY MEDICINE
Payer: COMMERCIAL

## 2017-10-25 DIAGNOSIS — R07.89 ATYPICAL CHEST PAIN: ICD-10-CM

## 2017-10-25 PROCEDURE — 93350 STRESS TTE ONLY: CPT | Mod: 26 | Performed by: INTERNAL MEDICINE

## 2017-10-25 PROCEDURE — 93321 DOPPLER ECHO F-UP/LMTD STD: CPT | Mod: TC | Performed by: INTERNAL MEDICINE

## 2017-10-25 PROCEDURE — 40000264 ECHO STRESS TEST WITH DEFINITY: Performed by: INTERNAL MEDICINE

## 2017-10-25 PROCEDURE — 93016 CV STRESS TEST SUPVJ ONLY: CPT | Performed by: INTERNAL MEDICINE

## 2017-10-25 PROCEDURE — 93352 ADMIN ECG CONTRAST AGENT: CPT | Performed by: INTERNAL MEDICINE

## 2017-10-25 PROCEDURE — 93325 DOPPLER ECHO COLOR FLOW MAPG: CPT | Mod: 26 | Performed by: INTERNAL MEDICINE

## 2017-10-25 PROCEDURE — 93321 DOPPLER ECHO F-UP/LMTD STD: CPT | Mod: 26 | Performed by: INTERNAL MEDICINE

## 2017-10-25 PROCEDURE — 93018 CV STRESS TEST I&R ONLY: CPT | Performed by: INTERNAL MEDICINE

## 2017-10-25 PROCEDURE — 93017 CV STRESS TEST TRACING ONLY: CPT | Performed by: INTERNAL MEDICINE

## 2017-10-25 PROCEDURE — 93325 DOPPLER ECHO COLOR FLOW MAPG: CPT | Mod: TC | Performed by: INTERNAL MEDICINE

## 2017-10-25 PROCEDURE — 93350 STRESS TTE ONLY: CPT | Mod: TC | Performed by: INTERNAL MEDICINE

## 2017-10-25 RX ADMIN — Medication 5 ML: at 14:15

## 2017-11-29 ENCOUNTER — OFFICE VISIT (OUTPATIENT)
Dept: FAMILY MEDICINE | Facility: CLINIC | Age: 52
End: 2017-11-29
Payer: COMMERCIAL

## 2017-11-29 VITALS
DIASTOLIC BLOOD PRESSURE: 86 MMHG | SYSTOLIC BLOOD PRESSURE: 128 MMHG | OXYGEN SATURATION: 97 % | BODY MASS INDEX: 36 KG/M2 | HEART RATE: 81 BPM | WEIGHT: 235 LBS

## 2017-11-29 DIAGNOSIS — R10.31 GROIN PAIN, RIGHT: Primary | ICD-10-CM

## 2017-11-29 PROCEDURE — 99213 OFFICE O/P EST LOW 20 MIN: CPT | Performed by: INTERNAL MEDICINE

## 2017-11-29 NOTE — NURSING NOTE
"Chief Complaint   Patient presents with     Leg Pain     right leg - X 6 months intermittent sharp/ dull pain - worse as day goes on       Initial /86  Pulse 81  Wt 235 lb (106.6 kg)  SpO2 97%  BMI 36 kg/m2 Estimated body mass index is 36 kg/(m^2) as calculated from the following:    Height as of 5/3/17: 5' 7.75\" (1.721 m).    Weight as of this encounter: 235 lb (106.6 kg).  Medication Reconciliation: complete   Krystina Rico CMA      "

## 2017-11-29 NOTE — MR AVS SNAPSHOT
After Visit Summary   11/29/2017    Pam Mora    MRN: [id number]           Patient Information     Date Of Birth          1965        Visit Information        Provider Department      11/29/2017 8:00 AM Debbie Mosqueda MD [facility]        Today's Diagnoses     Groin pain, right    -  1       Follow-ups after your visit        Additional Services     [facility] PT, HAND, AND CHIROPRACTIC REFERRAL       **This order will print in the [facility] Scheduling Office**    Physical Therapy, Hand Therapy and Chiropractic Care are available through:    *[facility]  *[facility]  *[facility]    Call one number to schedule at any of the above locations: [phone].    Your provider has referred you to: Physical Therapy at [facility] or [facility]    Indication/Reason for Referral: groin and leg pain  Onset of Illness: 2 years  Therapy Orders: Evaluate and Treat  Special Programs: None  Special Request: None    Vasyl Fallon      Additional Comments for the Therapist or Chiropractor:     Please be aware that coverage of these services is subject to the terms and limitations of your health insurance plan.  Call member services at your health plan with any benefit or coverage questions.      Please bring the following to your appointment:    *Your personal calendar for scheduling future appointments  *Comfortable clothing                  Who to contact     If you have questions or need follow up information about today's clinic visit or your schedule please contact [facility] directly at [phone].  Normal or non-critical lab and imaging results will be communicated to you by MyChart, letter or phone within 4 business days after the clinic has received the results. If you do not hear from us within 7 days, please contact the clinic through MyChart or phone. If you have a critical or abnormal lab result, we will notify you by phone as  soon as possible.  Submit refill requests through [employer] or call your pharmacy and they will forward the refill request to us. Please allow 3 business days for your refill to be completed.          Additional Information About Your Visit        [employer] Information     [employer] gives you secure access to your electronic health record. If you see a primary care provider, you can also send messages to your care team and make appointments. If you have questions, please call your primary care clinic.  If you do not have a primary care provider, please call [phone] and they will assist you.        Care EveryWhere ID     This is your Care EveryWhere ID. This could be used by other organizations to access your [facility] medical records  [id number]        Your Vitals Were     Pulse Pulse Oximetry BMI (Body Mass Index)             81 97% 36 kg/m2          Blood Pressure from Last 3 Encounters:   11/29/17 128/86   05/03/17 129/83   02/08/17 126/82    Weight from Last 3 Encounters:   11/29/17 235 lb (106.6 kg)   05/03/17 233 lb (105.7 kg)   02/08/17 231 lb (104.8 kg)              We Performed the Following     [facility] PT, HAND, AND CHIROPRACTIC REFERRAL        Primary Care Provider Office Phone # Fax #    Franki Eason [phone] [address] [facility] [address]        Equal Access to Services     [facility] SHRADDHA : Hadii aad ku hadasho Soomaali, waaxda luqadaha, qaybta kaalmada adeegyada, raymond johnson. So [facility] [phone].    ATENCIÓN: Si habla español, tiene a perrin disposición servicios gratuitos de asistencia lingüística. Llame al [phone].    We comply with applicable federal civil rights laws and [place] laws. We do not discriminate on the basis of race, color, national origin, age, disability, sex, sexual orientation, or gender identity.            Thank you!     Thank you for choosing [facility]  for your care. Our goal is always to provide you  with excellent care. Hearing back from our patients is one way we can continue to improve our services. Please take a few minutes to complete the written survey that you may receive in the mail after your visit with us. Thank you!             Your Updated Medication List - Protect others around you: Learn how to safely use, store and throw away your medicines at www.disposemymeds.org.          This list is accurate as of: 11/29/17  8:22 AM.  Always use your most recent med list.                   Brand Name Dispense Instructions for use Diagnosis    amLODIPine 5 MG tablet    NORVASC    90 tablet    TAKE ONE TABLET BY MOUTH EVERY DAY    Hypertension goal BP (blood pressure) < 140/90       atenolol-chlorthalidone 50-25 MG per tablet    TENORETIC    90 tablet    TAKE ONE TABLET BY MOUTH EVERY DAY    Hypertension goal BP (blood pressure) < 140/90       Multi-vitamin Tabs tablet   Generic drug:  multivitamin, therapeutic with minerals      1 TABLET DAILY        VENTOLIN  (90 BASE) MCG/ACT Inhaler   Generic drug:  albuterol     18 g    INHALE TWO PUFFS BY MOUTH EVERY 4 HOURS AS NEEDED FOR SHORTNESS OF BREATH / DYSPNEA    Mild persistent asthma without complication

## 2017-11-29 NOTE — PROGRESS NOTES
"SUBJECTIVE:  Pam Mora is an 52 year old female who presents for pain in front of upper right leg.  occurs on and off over past couple years.  Over the past six months has become worse.  Now is daily.  Started walking 30-40 minutes a day about six weeks ago.  Doesn't hurt when walking, but sometimes gets a dull ache and sometimes a sharp shooting pain.  Notices it's worse when goes from sitting to standing or if stands for a while.  If walks it usually \"works itself out\" and becomes a dull ache or goes away for a while.  Sometimes takes advil which helps for a while.  No h/o injuries, accidents, or falls.  No swelling or bulging in the groin or leg.  No fevers, chills, sweats.  Other leg is fine.  No n/v.         has a past medical history of AR (allergic rhinitis); Asthma; Elevated fasting glucose; GERD (gastroesophageal reflux disease); High cholesterol; HTN (hypertension); Incidental lung nodule (05/07); LSIL (low grade squamous intraepithelial lesion) on Pap smear; Migraine; and Obesity (BMI 30-39.9).  Social History     Social History     Marital status:      Spouse name: Terry     Number of children: 1     Years of education: 12     Occupational History      [employer]     Social History Main Topics     Smoking status: Former Smoker     Quit date: 1/6/2008     Smokeless tobacco: Never Used     Alcohol use Yes      Comment: 3 drinks a week     Drug use: No     Sexual activity: Yes     Partners: Male     Birth control/ protection: Surgical      Comment: -vasectomy     Other Topics Concern     Parent/Sibling W/ Cabg, Mi Or Angioplasty Before 65f 55m? No     Social History Narrative    Has a son, age 15 and 2 step-daughters     Family History   Problem Relation Age of Onset     Neurologic Disorder Maternal Grandfather      parkinsons     DIABETES Paternal Grandfather        ALLERGIES:  Polytrim [polymyxin b-trimethoprim] and Sulfa drugs [sulfa drugs]    Current " Outpatient Prescriptions   Medication     VENTOLIN  (90 BASE) MCG/ACT Inhaler     atenolol-chlorthalidone (TENORETIC) 50-25 MG per tablet     amLODIPine (NORVASC) 5 MG tablet     MULTI-VITAMIN OR TABS     No current facility-administered medications for this visit.          ROS:  ROS is done and is negative for general, constitutional, eye, ENT, Respiratory, cardiovascular, GI, , Skin, musculoskeletal except as noted elsewhere.      OBJECTIVE:  /86  Pulse 81  Wt 235 lb (106.6 kg)  SpO2 97%  BMI 36 kg/m2  GENERAL APPEARANCE: Alert, in no acute distress  EYES: normal  NOSE:normal  OROPHARYNX:normal  NECK:No adenopathy,masses or thyromegaly  RESP: normal and clear to auscultation  CV:regular rate and rhythm and no murmurs, clicks, or gallops  ABDOMEN: Abdomen soft, non-tender. BS normal. No masses, organomegaly  SKIN: no ulcers, lesions or rash  MUSCULOSKELETAL:Right LE: no tenderness of knee or hip.  No tenderness with palpation of the groin region, and no bulging with strain or defects noted.  Normal rom of knee and hip without pain.  No edema or erythema  NEURO:   Strength 5/5 and symmetric in bilateral lower extremities.  DTRs 2+ and symmetric in bilateral lower extremities.  Sensation to light touch grossly intact in bilateral lower extremities.      RESULTS  .  No results found for this or any previous visit (from the past 48 hour(s)).    ASSESSMENT/PLAN:    ASSESSMENT / PLAN:  (R10.31) Groin pain, right  (primary encounter diagnosis)  Comment: sxs and exam most c/w muscular and soft tissue strain/irritation  Plan: [facility] HAND, AND CHIROPRACTIC REFERRAL        As has had sxs for a while, will refer to PT for further tx.  I reviewed supportive care including ice and nsaids, expected course, and signs of concern.  Follow up as needed or if she does not improve within 3 week(s) with PT or if worsens in any way.  Reviewed red flag symptoms and is to go to the ER if experiences any of  these.      See [facility] for orders, medications, letters, patient instructions    Debbie Mosqueda M.D.

## 2018-01-08 DIAGNOSIS — I10 HYPERTENSION GOAL BP (BLOOD PRESSURE) < 140/90: ICD-10-CM

## 2018-01-08 RX ORDER — AMLODIPINE BESYLATE 5 MG/1
TABLET ORAL
Qty: 30 TABLET | Refills: 0 | Status: SHIPPED | OUTPATIENT
Start: 2018-01-08 | End: 2018-02-06

## 2018-01-08 RX ORDER — ATENOLOL AND CHLORTHALIDONE TABLET 50; 25 MG/1; MG/1
TABLET ORAL
Qty: 30 TABLET | Refills: 0 | Status: SHIPPED | OUTPATIENT
Start: 2018-01-08 | End: 2018-02-06

## 2018-02-06 DIAGNOSIS — I10 HYPERTENSION GOAL BP (BLOOD PRESSURE) < 140/90: ICD-10-CM

## 2018-02-07 RX ORDER — ATENOLOL AND CHLORTHALIDONE TABLET 50; 25 MG/1; MG/1
TABLET ORAL
Qty: 30 TABLET | Refills: 0 | Status: SHIPPED | OUTPATIENT
Start: 2018-02-07 | End: 2018-02-23

## 2018-02-07 RX ORDER — AMLODIPINE BESYLATE 5 MG/1
TABLET ORAL
Qty: 30 TABLET | Refills: 0 | Status: SHIPPED | OUTPATIENT
Start: 2018-02-07 | End: 2018-02-23

## 2018-02-23 ENCOUNTER — OFFICE VISIT (OUTPATIENT)
Dept: FAMILY MEDICINE | Facility: CLINIC | Age: 53
End: 2018-02-23
Payer: COMMERCIAL

## 2018-02-23 VITALS
RESPIRATION RATE: 16 BRPM | BODY MASS INDEX: 36.57 KG/M2 | HEART RATE: 69 BPM | WEIGHT: 233 LBS | DIASTOLIC BLOOD PRESSURE: 85 MMHG | SYSTOLIC BLOOD PRESSURE: 128 MMHG | HEIGHT: 67 IN | TEMPERATURE: 96.8 F | OXYGEN SATURATION: 96 %

## 2018-02-23 DIAGNOSIS — E87.6 HYPOKALEMIA: Primary | ICD-10-CM

## 2018-02-23 DIAGNOSIS — R73.03 PREDIABETES: ICD-10-CM

## 2018-02-23 DIAGNOSIS — R74.8 ELEVATED LIVER ENZYMES: ICD-10-CM

## 2018-02-23 DIAGNOSIS — I10 HYPERTENSION GOAL BP (BLOOD PRESSURE) < 140/90: ICD-10-CM

## 2018-02-23 DIAGNOSIS — E78.5 DYSLIPIDEMIA: ICD-10-CM

## 2018-02-23 DIAGNOSIS — Z12.11 SCREEN FOR COLON CANCER: Primary | ICD-10-CM

## 2018-02-23 LAB
ALBUMIN SERPL-MCNC: 4 G/DL (ref 3.4–5)
ALP SERPL-CCNC: 65 U/L (ref 40–150)
ALT SERPL W P-5'-P-CCNC: 42 U/L (ref 0–50)
ANION GAP SERPL CALCULATED.3IONS-SCNC: 4 MMOL/L (ref 3–14)
AST SERPL W P-5'-P-CCNC: 25 U/L (ref 0–45)
BILIRUB SERPL-MCNC: 0.8 MG/DL (ref 0.2–1.3)
BUN SERPL-MCNC: 18 MG/DL (ref 7–30)
CALCIUM SERPL-MCNC: 9.4 MG/DL (ref 8.5–10.1)
CHLORIDE SERPL-SCNC: 100 MMOL/L (ref 94–109)
CHOLEST SERPL-MCNC: 229 MG/DL
CO2 SERPL-SCNC: 35 MMOL/L (ref 20–32)
CREAT SERPL-MCNC: 0.66 MG/DL (ref 0.52–1.04)
GFR SERPL CREATININE-BSD FRML MDRD: >90 ML/MIN/1.7M2
GLUCOSE SERPL-MCNC: 117 MG/DL (ref 70–99)
HBA1C MFR BLD: 5.6 % (ref 4.3–6)
HDLC SERPL-MCNC: 79 MG/DL
LDLC SERPL CALC-MCNC: 133 MG/DL
NONHDLC SERPL-MCNC: 150 MG/DL
POTASSIUM SERPL-SCNC: 3.3 MMOL/L (ref 3.4–5.3)
PROT SERPL-MCNC: 7.8 G/DL (ref 6.8–8.8)
SODIUM SERPL-SCNC: 139 MMOL/L (ref 133–144)
TRIGL SERPL-MCNC: 85 MG/DL
TSH SERPL DL<=0.005 MIU/L-ACNC: 2.02 MU/L (ref 0.4–4)

## 2018-02-23 PROCEDURE — 80061 LIPID PANEL: CPT | Performed by: FAMILY MEDICINE

## 2018-02-23 PROCEDURE — 84443 ASSAY THYROID STIM HORMONE: CPT | Performed by: FAMILY MEDICINE

## 2018-02-23 PROCEDURE — 99214 OFFICE O/P EST MOD 30 MIN: CPT | Performed by: FAMILY MEDICINE

## 2018-02-23 PROCEDURE — 36415 COLL VENOUS BLD VENIPUNCTURE: CPT | Performed by: FAMILY MEDICINE

## 2018-02-23 PROCEDURE — 83036 HEMOGLOBIN GLYCOSYLATED A1C: CPT | Performed by: FAMILY MEDICINE

## 2018-02-23 PROCEDURE — 80053 COMPREHEN METABOLIC PANEL: CPT | Performed by: FAMILY MEDICINE

## 2018-02-23 RX ORDER — ATENOLOL AND CHLORTHALIDONE TABLET 50; 25 MG/1; MG/1
1 TABLET ORAL DAILY
Qty: 90 TABLET | Refills: 3 | Status: SHIPPED | OUTPATIENT
Start: 2018-02-23 | End: 2019-04-18

## 2018-02-23 RX ORDER — AMLODIPINE BESYLATE 5 MG/1
5 TABLET ORAL DAILY
Qty: 90 TABLET | Refills: 3 | Status: SHIPPED | OUTPATIENT
Start: 2018-02-23 | End: 2019-03-05

## 2018-02-23 NOTE — PROGRESS NOTES
Martha Orozco,    Your cholesterol was a bit high. Your estimated 10 year risk of a heart attack or stroke is 1.6%. Statin drugs are recommended at 7.5% or greater.     Therefore you don't need medications. But please double up your efforts in diet and exercise.    Your glucose was still in the pre-diabetes range. No need for medications.    Your potassium was a bit low which may be related to the HCTZ - please take a banana per day. Make a lab appointment in 3-4 weeks to recheck that.    Your liver enzymes are normal along with kidney function etc.    Regards,    Franki Eason M.D.

## 2018-02-23 NOTE — PROGRESS NOTES
HPI:    Pam is a 52 year old female here to discuss:    Previous atypical chest pain - has had this for many years off and on. It was located on the right upper chest but now on the left upper chest. Described as an ache, this is random and non exertional. Lasting only a few seconds, it does not radiate anywhere. Severity can be up to 5/10. It resolves with time but can get worse with deep breathing. Not associated with nausea, shortness of breath, dizziness, palpitations. She denies anxiety/stress. She has GERD for which she takes Omeprazole. Denies recreational drug use. Not sure about chest wall pain. Denies risk factors for PE like estrogen use, long car/plane ride, FH of blood clots.  Evaluation and treatment:    Stress echo negative 8/31/05   Nuclear stress test 5/21/09 negative   Stress echo negative 10/25/17.   She inquired about coronary calcium score but I convinced her to focus on risk modification and not more testing.     Abdomina bloating, elevated liver enzymes due to fatty liver - this is chronic intermittent. She denies pain but there is discomfort. Fullness after eating small amounts. She sometimes has diarrhea mild. But generally normal BM's 1-2 per day. No nausea or vomiting. No fevers or weight loss.   Evaluation and treatment:   ultrasound 6/5/14 showing fatty liver and also two spots on the liver.    MRI on 6/11/14 showed those spots to be sparing of fatty liver.    Another ultrasound 11/7/14 to evaluate the chest pain and elevated liver enzymes, similar findings.   Has seen [facility]   CMP today.    Thickened endometrium and ovarian cyst -   Evaluation and treatment:   U/S as below on 6/5/14   Endometrial bx as below 7/16/14   Saw GYN on 10/13/14 but only hot flushes discussed       PELVIC ULTRASOUND COMPLETE WITH TRANSVAGINAL IMAGING 6/5/2014, 9:57 AM  HISTORY: Flatulence, eructation, and gas pain.  COMPARISON: None.  FINDINGS: Transabdominal and transvaginal imaging was  performed.  Transvaginal imaging was performed to better visualize the endometrium  and adnexa. Uterus is retroverted and normal in size measuring 9.6 x  6.8 x 5.7 cm. The endometrium measures 1.2 cm in thickness. The right  ovary is normal in size and appearance. There is a cyst in the left  ovary measuring 4.0 x 3.4 x 3.2 cm. Color Doppler and Doppler waveform  analysis of the ovary shows blood flow bilaterally. No torsion. There  is small amount of free fluid in the pelvis.  IMPRESSION  IMPRESSION:  1. The endometrium is abnormally thickened for a postmenopausal woman  measuring 1.2 cm.   2. There is a 4.0 cm simple cyst in the left ovary, abnormal in a  postmenopausal woman. Followup in 3-6 months recommended.  FELICIA ASENCIO MD    Patient Name: SANDRA SMITH   MR#: [id number]   Specimen #: [id number]   Collected: 7/16/2014   Received: 7/16/2014   Reported: 7/17/2014 06:32   Ordering Phy(s): ENDER POLANCO       SPECIMEN(S):   Endometrial biopsy     FINAL DIAGNOSIS:   Endometrial biopsy:         - Inactive atrophic endometrium with stromal breakdown (see   comment)     COMMENT:   Although there is no evidence of hyperplasia in the planes examined,   there are no good-sized tissue fragments with intact glands and stroma   to adequately assess for hyperplasia.  There is no evidence of atypia or   malignancy.  Should the bleeding persist, especially if the patient has   risk factors for hyperplasia, repeat biopsy may be recommended.     HTN - not checking at home. Controlled in clinic.  Evaluation and treatment:    Atenolol/Chlorthalidone 50/25 mg qd - no side effects.   Lisinopril stopped because it caused cough   Norvasc 5 mg qd - no side effects.   Continue current tx.   Check fasting CMP today.    Last Basic Metabolic Panel:  NA      139   1/2/2017   POTASSIUM      3.8   1/2/2017  CHLORIDE      101   1/2/2017  BRIELLE      9.6   1/2/2017  CO2       28   1/2/2017  BUN       12   1/2/2017  CR     0.77    1/2/2017  GLC      125   1/2/2017    CBC RESULTS:   Recent Labs   Lab Test  11/20/15   0855   WBC  4.7   RBC  4.62   HGB  14.5   HCT  43.2   MCV  94   MCH  31.4   MCHC  33.6   RDW  12.9   PLT  218     Dyslipidemia - No h/o CAD, CVA, PAD or diabetes.  Evaluation and treatment:    Stopped Zocor for no specicif reason but based on ATP4, statin not needed anyway.   Check fasting lipids today    Recent Labs   Lab Test  01/02/17   0745  11/20/15   0855  10/22/14   0850  01/13/14   0851   CHOL  275*  216*  215*  252*   HDL  75  73  73  66   LDL  146*  114*  112  168*   TRIG  272*  144  151*  88   CHOLHDLRATIO   --    --   2.9  3.8     The 10-year ASCVD risk score (Van CRAMER Jr, et al., 2013) is: 2.1%    Values used to calculate the score:      Age: 52 years      Sex: Female      Is Non- : No      Diabetic: No      Tobacco smoker: No      Systolic Blood Pressure: 128 mmHg      Is BP treated: Yes      HDL Cholesterol: 75 mg/dL      Total Cholesterol: 275 mg/dL       Obesity/prediabetes -   Evaluation and treatment:    has seen nutritionist previously.    Has previously  followed with obesity clinic.    Tries to eat healthy.    Diet and exercise discussed.   Check TSH and A1c today.  TSH     Date   Value   Range   Status     5/15/2014   2.95    0.4 - 5.0 mU/L   Final       Wt Readings from Last 5 Encounters:   02/23/18 233 lb (105.7 kg)   11/29/17 235 lb (106.6 kg)   05/03/17 233 lb (105.7 kg)   02/08/17 231 lb (104.8 kg)   01/09/17 226 lb (102.5 kg)     Mild persistent asthma - has symptoms of wheezing and shortness of breath rarely.  Evaluation and treatment:    Previously on Flovent.    Now stable on Alb prn.     ACT Total Scores 2/23/2018   ACT TOTAL SCORE -   ASTHMA ER VISITS -   ASTHMA HOSPITALIZATIONS -   ACT TOTAL SCORE (Goal Greater than or Equal to 20) 24   In the past 12 months, how many times did you visit the emergency room for your asthma without being admitted to the hospital? 0   In the  past 12 months, how many times were you hospitalized overnight because of your asthma? 0     Migraines - has not had a flare since 2013. Previously on Topamax but not any more.      Lung nodule - Had 3 mm lung nodule in 2007. She denies cough, shortness of breath or other respiratory symptoms. She has previous h/o smoking. Follow up CT as below - no further evaluation needed.  CT 11/5/13  IMPRESSION:   1. The indeterminate left lung base nodule is redemonstrated on image  39. Currently measured as 0.4 cm. This is within the range of accurate  caliper placement. There is no significant interval change seen.  2. Small calcification along the anterior left lobe of thyroid.  3. No acute pulmonary disease.    Thyroid calcification - as above on CT noted incidentally. TSH 4/25/13 normal. U/S showed thyroid nodule. She saw endocrine and was told the bx was normal. check TSH today.    TSH   Date Value Ref Range Status   11/20/2015 2.06 0.40 - 4.00 mU/L Final     Skin lumps on abd -     ULTRASOUND ABDOMEN LIMITED July 10, 2015 8:31 AM  HISTORY: Palpable subcutaneous masses in the right abdomen.  FINDINGS: Ultrasound was targeted to the palpable masses in the right  upper quadrant. Each of these correlates with a superficial  circumscribed hyperechoic nodule. No internal vascularity is  demonstrated. The first measures 1.4 x 1.8 x 1.2 cm. The second  measures 1.5 x 1.9 x 0.9 cm.   IMPRESSION  IMPRESSION: Findings suggest that the palpable nodules represent  lipomas.  CECI HOLLY MD    Tobacco abuse - Has 23 pack history of smoking. Quit in 2007. We will offer lung cancer screen at age 55.     Preventive - Declines Tdap, Prevnar, Pneumovax and flu shot.    There is no immunization history on file for this patient.    Mammogram 5/2/17 neg - she does this on her own through her own and she sends a copy.    PAP - generally declines - but now has an appointment with GYN on 2/28/18.    Lab Results   Component Value Date    PAP  "NIL 11/15/2012     Hep C screen: negative 1/2/17    Colon cancer screen: patient was scheduled with [facility], but didn't go to the appointment per [facility]. She is willing to do FIT which I ordered.    ROS:    Const: No fevers, weight changes or night sweats recently.  ENT: No runny nose, sore throat or ear pain.  Resp: No cough or shortness of breath.  CV: No dizziness or cardiac palpitations.  GI: No nausea, vomiting, diarrhea or constipation. Denies blood in stools or black stools.  : No dysuria, frequency or hematuria.    SH:    Marital status:   Kids: 3  Employment: with [employer]  Exercise: 21 day fix, using video tapes, cardio and weight, 30 min per day, until Nov, Since then walking  Tobacco: per HPI  Etoh: 2-3 per week, 2 at a time  Recreational drugs: no  Caffeine: coffee one cup per day    FH:    High cholesterol dad. Dad age 82, CABG.      Exam:    /85 (Cuff Size: Adult Large)  Pulse 69  Temp 96.8  F (36  C) (Oral)  Resp 16  Ht 5' 7\" (1.702 m)  Wt 233 lb (105.7 kg)  SpO2 96%  Breastfeeding? No  BMI 36.49 kg/m2    Gen: Healthy appearing female in no acute distress  Eyes: Conjunctiva and sclera normal. Pupils react normally to light. No nystagmus.  Neck: No enlarged lymph nodes, thyromegally or other masses.  Lungs: Good air movement and otherwise clear.  CV: Heart RRR with no murmurs. No JVD, carotid bruits or leg edema.    Assessment and Plan - Decision Making    1. Hypertension goal BP (blood pressure) < 140/90  Per HPI  - atenolol-chlorthalidone (TENORETIC 50) 50-25 MG per tablet; Take 1 tablet by mouth daily  Dispense: 90 tablet; Refill: 3  - amLODIPine (NORVASC) 5 MG tablet; Take 1 tablet (5 mg) by mouth daily  Dispense: 90 tablet; Refill: 3    2. Dyslipidemia  Per HPI  - Lipid panel reflex to direct LDL Fasting    3. Elevated liver enzymes  Per HPI  - Comprehensive metabolic panel    4. Prediabetes  Per HPI  - TSH with free T4 reflex  - Hemoglobin A1c    5. Screen for " colon cancer  Per HPI  - Fecal colorectal cancer screen (FIT); Future      Written instructions given as follows:    Patient Instructions   1. Double up your efforts in regular exercise.    2. I will contact you about your test results via My Chart.    3. Don't forget to submit the stool card. Let me know if you change your mind about colonoscopy.    4. If all is well, see you in one year for a physical with fasting labs.

## 2018-02-23 NOTE — NURSING NOTE
"Chief Complaint   Patient presents with     Hypertension     Lipids       Initial /85 (Cuff Size: Adult Large)  Pulse 69  Temp 96.8  F (36  C) (Oral)  Resp 16  Ht 5' 7\" (1.702 m)  Wt 233 lb (105.7 kg)  SpO2 96%  Breastfeeding? No  BMI 36.49 kg/m2 Estimated body mass index is 36.49 kg/(m^2) as calculated from the following:    Height as of this encounter: 5' 7\" (1.702 m).    Weight as of this encounter: 233 lb (105.7 kg).      Tia Monge LPN        "

## 2018-02-23 NOTE — MR AVS SNAPSHOT
After Visit Summary   2/23/2018    Pam Mora    MRN: [id number]           Patient Information     Date Of Birth          1965        Visit Information        Provider Department      2/23/2018 8:50 AM Franki Eason MD [facility]        Today's Diagnoses     Screen for colon cancer    -  1    Dyslipidemia        Elevated liver enzymes        Hypertension goal BP (blood pressure) < 140/90        Prediabetes          Care Instructions    1. Double up your efforts in regular exercise.    2. I will contact you about your test results via My Chart.    3. Don't forget to submit the stool card. Let me know if you change your mind about colonoscopy.    4. If all is well, see you in one year for a physical with fasting labs.          Follow-ups after your visit        Your next 10 appointments already scheduled     Feb 28, 2018  7:50 AM CST   PHYSICAL with SONIA Woods CNP   [facility]    [address] [facility] [address] [phone]              Future tests that were ordered for you today     Open Future Orders        Priority Expected Expires Ordered    Fecal colorectal cancer screen (FIT) Routine 3/16/2018 5/18/2018 2/23/2018            Who to contact     If you have questions or need follow up information about today's clinic visit or your schedule please contact [facility] directly at [phone].  Normal or non-critical lab and imaging results will be communicated to you by MyChart, letter or phone within 4 business days after the clinic has received the results. If you do not hear from us within 7 days, please contact the clinic through MyChart or phone. If you have a critical or abnormal lab result, we will notify you by phone as soon as possible.  Submit refill requests through [employer] or call your pharmacy and they will forward the refill request to us. Please allow 3 business days for  "your refill to be completed.          Additional Information About Your Visit        [employer] Information     [employer] gives you secure access to your electronic health record. If you see a primary care provider, you can also send messages to your care team and make appointments. If you have questions, please call your primary care clinic.  If you do not have a primary care provider, please call [phone] and they will assist you.        Care EveryWhere ID     This is your Care EveryWhere ID. This could be used by other organizations to access your [facility] medical records  [id number]        Your Vitals Were     Pulse Temperature Respirations Height Pulse Oximetry Breastfeeding?    69 96.8  F (36  C) (Oral) 16 5' 7\" (1.702 m) 96% No    BMI (Body Mass Index)                   36.49 kg/m2            Blood Pressure from Last 3 Encounters:   02/23/18 128/85   11/29/17 128/86   05/03/17 129/83    Weight from Last 3 Encounters:   02/23/18 233 lb (105.7 kg)   11/29/17 235 lb (106.6 kg)   05/03/17 233 lb (105.7 kg)              We Performed the Following     Comprehensive metabolic panel     Hemoglobin A1c     Lipid panel reflex to direct LDL Fasting     TSH with free T4 reflex          Today's Medication Changes          These changes are accurate as of 2/23/18  9:23 AM.  If you have any questions, ask your nurse or doctor.               These medicines have changed or have updated prescriptions.        Dose/Directions    * amLODIPine 5 MG tablet   Commonly known as:  NORVASC   This may have changed:  Another medication with the same name was added. Make sure you understand how and when to take each.   Used for:  Hypertension goal BP (blood pressure) < 140/90   Changed by:  Franki Eason MD        TAKE ONE TABLET BY MOUTH EVERY DAY   Quantity:  30 tablet   Refills:  0       * amLODIPine 5 MG tablet   Commonly known as:  NORVASC   This may have changed:  You were already taking a medication with the same name, and " this prescription was added. Make sure you understand how and when to take each.   Used for:  Hypertension goal BP (blood pressure) < 140/90   Changed by:  Franki Eason MD        Dose:  5 mg   Take 1 tablet (5 mg) by mouth daily   Quantity:  90 tablet   Refills:  3       * atenolol-chlorthalidone 50-25 MG per tablet   Commonly known as:  TENORETIC   This may have changed:  Another medication with the same name was added. Make sure you understand how and when to take each.   Used for:  Hypertension goal BP (blood pressure) < 140/90   Changed by:  Franki Eason MD        TAKE ONE TABLET BY MOUTH EVERY DAY   Quantity:  30 tablet   Refills:  0       * atenolol-chlorthalidone 50-25 MG per tablet   Commonly known as:  TENORETIC 50   This may have changed:  You were already taking a medication with the same name, and this prescription was added. Make sure you understand how and when to take each.   Used for:  Hypertension goal BP (blood pressure) < 140/90   Changed by:  Franki Eason MD        Dose:  1 tablet   Take 1 tablet by mouth daily   Quantity:  90 tablet   Refills:  3       * Notice:  This list has 4 medication(s) that are the same as other medications prescribed for you. Read the directions carefully, and ask your doctor or other care provider to review them with you.         Where to get your medicines      These medications were sent to [facility] [address], [facility] [address]  [id number] [place] [address]     Phone:  [phone]     amLODIPine 5 MG tablet    atenolol-chlorthalidone 50-25 MG per tablet                Primary Care Provider Office Phone # Fax #    Franki Eason [phone]       [id number] [place], [place] [id number]        Equal Access to Services     GERALD LLANES : Magaly Villavicencio, wanba lujuliana, qaybjeremiah kaalzofia mahajan, raymond johnson. So [facility] [phone].    ATENCIÓN: Si  flavio connell, tiene a perrin disposición servicios gratuitos de asistencia lingüística. Nicole lentz [phone].    We comply with applicable federal civil rights laws and [place] laws. We do not discriminate on the basis of race, color, national origin, age, disability, sex, sexual orientation, or gender identity.            Thank you!     Thank you for choosing [facility]  for your care. Our goal is always to provide you with excellent care. Hearing back from our patients is one way we can continue to improve our services. Please take a few minutes to complete the written survey that you may receive in the mail after your visit with us. Thank you!             Your Updated Medication List - Protect others around you: Learn how to safely use, store and throw away your medicines at www.disposemymeds.org.          This list is accurate as of 2/23/18  9:23 AM.  Always use your most recent med list.                   Brand Name Dispense Instructions for use Diagnosis    * amLODIPine 5 MG tablet    NORVASC    30 tablet    TAKE ONE TABLET BY MOUTH EVERY DAY    Hypertension goal BP (blood pressure) < 140/90       * amLODIPine 5 MG tablet    NORVASC    90 tablet    Take 1 tablet (5 mg) by mouth daily    Hypertension goal BP (blood pressure) < 140/90       * atenolol-chlorthalidone 50-25 MG per tablet    TENORETIC    30 tablet    TAKE ONE TABLET BY MOUTH EVERY DAY    Hypertension goal BP (blood pressure) < 140/90       * atenolol-chlorthalidone 50-25 MG per tablet    TENORETIC 50    90 tablet    Take 1 tablet by mouth daily    Hypertension goal BP (blood pressure) < 140/90       Multi-vitamin Tabs tablet   Generic drug:  multivitamin, therapeutic with minerals      1 TABLET DAILY        VENTOLIN  (90 BASE) MCG/ACT Inhaler   Generic drug:  albuterol     18 g    INHALE TWO PUFFS BY MOUTH EVERY 4 HOURS AS NEEDED FOR SHORTNESS OF BREATH / DYSPNEA    Mild persistent asthma without complication       * Notice:   This list has 4 medication(s) that are the same as other medications prescribed for you. Read the directions carefully, and ask your doctor or other care provider to review them with you.

## 2018-02-23 NOTE — LETTER
My Asthma Action Plan  Name: Pam Mora   YOB: 1965  Date: 2/23/2018   My doctor: FAVIAN GOLD MD   My clinic: [facility]   Good Control    I feel good    No cough or wheeze    Can work, sleep and play without asthma symptoms       Take your asthma control medicine every day.     1. If exercise triggers your asthma, take your rescue medication    15 minutes before exercise or sports, and    During exercise if you have asthma symptoms  2. Spacer to use with inhaler: If you have a spacer, make sure to use it with your inhaler             YELLOW ZONE Getting Worse  I have ANY of these:    I do not feel good    Cough or wheeze    Chest feels tight    Wake up at night   1. Keep taking your Green Zone medications  2. Start taking your rescue medicine:    every 20 minutes for up to 1 hour. Then every 4 hours for 24-48 hours.  3. If you stay in the Yellow Zone for more than 12-24 hours, contact your doctor.  4. If you do not return to the Green Zone in 12-24 hours or you get worse, start taking your oral steroid medicine if prescribed by your provider.           RED ZONE Medical Alert - Get Help  I have ANY of these:    I feel awful    Medicine is not helping    Breathing getting harder    Trouble walking or talking    Nose opens wide to breathe       1. Take your rescue medicine NOW  2. If your provider has prescribed an oral steroid medicine, start taking it NOW  3. Call your doctor NOW  4. If you are still in the Red Zone after 20 minutes and you have not reached your doctor:    Take your rescue medicine again and    Call 911 or go to the emergency room right away    See your regular doctor within 2 weeks of an Emergency Room or Urgent Care visit for follow-up treatment.      Annual Reminders:  Meet with Asthma Educator,  Flu Shot in the Fall, consider Pneumonia Vaccination for patients with asthma (aged 19 and older).    Pharmacy:    [facility]  - [place] - [address] [facility], [address]                    Asthma Triggers  How To Control Things That Make Your Asthma Worse    Triggers are things that make your asthma worse.  Look at the list below to help you find your triggers and what you can do about them.  You can help prevent asthma flare-ups by staying away from your triggers.      Trigger                                                          What you can do   Cigarette Smoke  Tobacco smoke can make asthma worse. Do not allow smoking in your home, car or around you.  Be sure no one smokes at a child s day care or school.  If you smoke, ask your health care provider for ways to help you quit.  Ask family members to quit too.  Ask your health care provider for a referral to Quit Plan to help you quit smoking, or call 6-524-398-PLAN.     Colds, Flu, Bronchitis  These are common triggers of asthma. Wash your hands often.  Don t touch your eyes, nose or mouth.  Get a flu shot every year.     Dust Mites  These are tiny bugs that live in cloth or carpet. They are too small to see. Wash sheets and blankets in hot water every week.   Encase pillows and mattress in dust mite proof covers.  Avoid having carpet if you can. If you have carpet, vacuum weekly.   Use a dust mask and HEPA vacuum.   Pollen and Outdoor Mold  Some people are allergic to trees, grass, or weed pollen, or molds. Try to keep your windows closed.  Limit time out doors when pollen count is high.   Ask you health care provider about taking medicine during allergy season.     Animal Dander  Some people are allergic to skin flakes, urine or saliva from pets with fur or feathers. Keep pets with fur or feathers out of your home.    If you can t keep the pet outdoors, then keep the pet out of your bedroom.  Keep the bedroom door closed.  Keep pets off cloth furniture and away from stuffed toys.     Mice, Rats, and Cockroaches  Some people are  allergic to the waste from these pests.   Cover food and garbage.  Clean up spills and food crumbs.  Store grease in the refrigerator.   Keep food out of the bedroom.   Indoor Mold  This can be a trigger if your home has high moisture. Fix leaking faucets, pipes, or other sources of water.   Clean moldy surfaces.  Dehumidify basement if it is damp and smelly.   Smoke, Strong Odors, and Sprays  These can reduce air quality. Stay away from strong odors and sprays, such as perfume, powder, hair spray, paints, smoke incense, paint, cleaning products, candles and new carpet.   Exercise or Sports  Some people with asthma have this trigger. Be active!  Ask your doctor about taking medicine before sports or exercise to prevent symptoms.    Warm up for 5-10 minutes before and after sports or exercise.     Other Triggers of Asthma  Cold air:  Cover your nose and mouth with a scarf.  Sometimes laughing or crying can be a trigger.  Some medicines and food can trigger asthma.

## 2018-02-23 NOTE — PATIENT INSTRUCTIONS
1. Double up your efforts in regular exercise.    2. I will contact you about your test results via My Chart.    3. Don't forget to submit the stool card. Let me know if you change your mind about colonoscopy.    4. If all is well, see you in one year for a physical with fasting labs.

## 2018-02-24 ASSESSMENT — ASTHMA QUESTIONNAIRES: ACT_TOTALSCORE: 24

## 2018-03-10 DIAGNOSIS — I10 HYPERTENSION GOAL BP (BLOOD PRESSURE) < 140/90: ICD-10-CM

## 2018-03-12 RX ORDER — AMLODIPINE BESYLATE 5 MG/1
TABLET ORAL
Qty: 30 TABLET | Refills: 0 | OUTPATIENT
Start: 2018-03-12

## 2018-03-12 NOTE — TELEPHONE ENCOUNTER
Request for amlodipine. Refill was sent 2/23/18 for one yr. Pharmacy should have refills. Lucila Vieyra RN

## 2018-03-19 ENCOUNTER — TELEPHONE (OUTPATIENT)
Dept: FAMILY MEDICINE | Facility: CLINIC | Age: 53
End: 2018-03-19

## 2018-03-19 NOTE — LETTER
Pam Mora                                                                [address] [facility] [id number]              March 19, 2018             Our records indicate that you have not scheduled for a(n)annual female exam which was recommended by your health care team. Monitoring and managing your preventative and chronic health conditions are very important to us.     PLEASE MAIL BACK THE STOOL FIT CARD    Please call [phone] or message us through your [employer] account to schedule an appointment or provide information for your chart.     If you are receiving any of these services at another site please bring in a copy at your next visit so we can get it scanned into your chart.     I look forward to seeing you and working with you on your health care needs.     Sincerely,       Franki Eason MD/dank

## 2018-03-19 NOTE — TELEPHONE ENCOUNTER
Panel Management Review      Patient has the following on her problem list:     Asthma review     ACT Total Scores 2/23/2018   ACT TOTAL SCORE -   ASTHMA ER VISITS -   ASTHMA HOSPITALIZATIONS -   ACT TOTAL SCORE (Goal Greater than or Equal to 20) 24   In the past 12 months, how many times did you visit the emergency room for your asthma without being admitted to the hospital? 0   In the past 12 months, how many times were you hospitalized overnight because of your asthma? 0      1. Is Asthma diagnosis on the Problem List? Yes    2. Is Asthma listed on Health Maintenance? Yes    3. Patient is due for:  none    Hypertension   Last three blood pressure readings:  BP Readings from Last 3 Encounters:   02/23/18 128/85   11/29/17 128/86   05/03/17 129/83     Blood pressure: Passed    HTN Guidelines:  Age 18-59 BP range:  Less than 140/90  Age 60-85 with Diabetes:  Less than 140/90  Age 60-85 without Diabetes:  less than 150/90      Composite cancer screening  Chart review shows that this patient is due/due soon for the following Fecal Colorectal (FIT)  Summary:    Patient is due/failing the following:   FIT    Action needed:   Patient needs office visit for a physical.    Type of outreach:    Sent letter.    Questions for provider review:    None                                                                                                                                    Tia Monge LPN       Chart routed to Care Team .

## 2018-04-06 NOTE — PROGRESS NOTES
"  SUBJECTIVE:                                                    Pam Mora is a 53 year old female who presents to clinic today for the following health issues:    PAP, Physical was done by [facility] per pt    Colon Cancer screening is due.  She has fit card but will be returning it soon.       No concerns.     Had an abnormal pap but that was a \"long time ago per patient\".    Problem list and histories reviewed & adjusted, as indicated.  Additional history: as documented    Patient Active Problem List   Diagnosis     Rhinitis, allergic seasonal     GERD (gastroesophageal reflux disease)     LSIL (low grade squamous intraepithelial lesion) on Pap smear     Incidental lung nodule     Hypertension goal BP (blood pressure) < 140/90     Migraines     Obesity (BMI 30-39.9)     Abnormal thyroid scan     Menopause     Chest pain     Elevated liver enzymes     Mild persistent asthma without complication     Dyslipidemia     Prediabetes     Past Surgical History:   Procedure Laterality Date     BIOPSY      THYROID , UTERUS, CERVIX     NO HISTORY OF SURGERY      Dated 10/12/10       Social History   Substance Use Topics     Smoking status: Former Smoker     Quit date: 1/6/2008     Smokeless tobacco: Never Used     Alcohol use Yes      Comment: 3 drinks a week     Family History   Problem Relation Age of Onset     Neurologic Disorder Maternal Grandfather      parkinsons     DIABETES Paternal Grandfather          Current Outpatient Prescriptions   Medication Sig Dispense Refill     atenolol-chlorthalidone (TENORETIC 50) 50-25 MG per tablet Take 1 tablet by mouth daily 90 tablet 3     amLODIPine (NORVASC) 5 MG tablet Take 1 tablet (5 mg) by mouth daily 90 tablet 3     VENTOLIN  (90 BASE) MCG/ACT Inhaler INHALE TWO PUFFS BY MOUTH EVERY 4 HOURS AS NEEDED FOR SHORTNESS OF BREATH / DYSPNEA 18 g 0     MULTI-VITAMIN OR TABS 1 TABLET DAILY       Allergies   Allergen Reactions     Polytrim [Polymyxin B-Trimethoprim]      " Sulfa Drugs [Sulfa Drugs] Hives       ROS:  Constitutional, HEENT, cardiovascular, pulmonary, GI, , musculoskeletal, neuro, skin, endocrine and psych systems are negative, except as otherwise noted.    OBJECTIVE:     /81  Pulse 80  Temp 97.9  F (36.6  C) (Oral)  Resp 18  Wt 232 lb (105.2 kg)  SpO2 94%  Breastfeeding? No  BMI 36.34 kg/m2  Body mass index is 36.34 kg/(m^2).  GENERAL: alert, no distress and obese  RESP: {:non-labored   (female): normal female external genitalia, normal urethral meatus, vaginal mucosa, normal cervix/adnexa/uterus without masses or discharge  MS: no gross musculoskeletal defects noted, no edema  SKIN: no suspicious lesions or rashes  NEURO: Normal strength and tone, mentation intact and speech normal  PSYCH: mentation appears normal, affect normal/bright    ASSESSMENT/PLAN:   ASSESSMENT / PLAN:  (Z12.4) Screening for malignant neoplasm of cervix  (primary encounter diagnosis)  Comment:   Plan: Pap imaged thin layer screen with HPV -         recommended age 30 - 65 years (select HPV order        below), HPV High Risk Types DNA Cervical            (Z12.11) Screen for colon cancer  Comment:   Plan: she will return her fit card ifeanyi Herman PA-C  [facility]

## 2018-04-16 ENCOUNTER — TELEPHONE (OUTPATIENT)
Dept: FAMILY MEDICINE | Facility: CLINIC | Age: 53
End: 2018-04-16

## 2018-04-16 ENCOUNTER — OFFICE VISIT (OUTPATIENT)
Dept: FAMILY MEDICINE | Facility: CLINIC | Age: 53
End: 2018-04-16
Payer: COMMERCIAL

## 2018-04-16 VITALS
BODY MASS INDEX: 36.34 KG/M2 | RESPIRATION RATE: 18 BRPM | DIASTOLIC BLOOD PRESSURE: 81 MMHG | TEMPERATURE: 97.9 F | HEART RATE: 80 BPM | WEIGHT: 232 LBS | SYSTOLIC BLOOD PRESSURE: 124 MMHG | OXYGEN SATURATION: 94 %

## 2018-04-16 DIAGNOSIS — Z12.11 SCREEN FOR COLON CANCER: ICD-10-CM

## 2018-04-16 DIAGNOSIS — Z12.4 SCREENING FOR MALIGNANT NEOPLASM OF CERVIX: Primary | ICD-10-CM

## 2018-04-16 PROCEDURE — 87624 HPV HI-RISK TYP POOLED RSLT: CPT | Performed by: PHYSICIAN ASSISTANT

## 2018-04-16 PROCEDURE — 99213 OFFICE O/P EST LOW 20 MIN: CPT | Performed by: PHYSICIAN ASSISTANT

## 2018-04-16 PROCEDURE — G0145 SCR C/V CYTO,THINLAYER,RESCR: HCPCS | Performed by: PHYSICIAN ASSISTANT

## 2018-04-16 NOTE — NURSING NOTE
"Chief Complaint   Patient presents with     Gyn Exam     PAP     Health Maintenance     PAP and colon cancer       Initial /81  Pulse 80  Temp 97.9  F (36.6  C) (Oral)  Resp 18  Wt 232 lb (105.2 kg)  SpO2 94%  Breastfeeding? No  BMI 36.34 kg/m2 Estimated body mass index is 36.34 kg/(m^2) as calculated from the following:    Height as of 2/23/18: 5' 7\" (1.702 m).    Weight as of this encounter: 232 lb (105.2 kg).  Medication Reconciliation: complete      Анна Jj MA    "

## 2018-04-16 NOTE — MR AVS SNAPSHOT
After Visit Summary   4/16/2018    Pam Mora    MRN: [id number]           Patient Information     Date Of Birth          1965        Visit Information        Provider Department      4/16/2018 7:20 AM Debbie Herman PA-C [facility]        Today's Diagnoses     Screen for colon cancer        Screening for malignant neoplasm of cervix           Follow-ups after your visit        Who to contact     If you have questions or need follow up information about today's clinic visit or your schedule please contact [facility] directly at [phone].  Normal or non-critical lab and imaging results will be communicated to you by [employer], letter or phone within 4 business days after the clinic has received the results. If you do not hear from us within 7 days, please contact the clinic through [employer] or phone. If you have a critical or abnormal lab result, we will notify you by phone as soon as possible.  Submit refill requests through "[employer]" or call your pharmacy and they will forward the refill request to us. Please allow 3 business days for your refill to be completed.          Additional Information About Your Visit        MyChart Information     "[employer]" gives you secure access to your electronic health record. If you see a primary care provider, you can also send messages to your care team and make appointments. If you have questions, please call your primary care clinic.  If you do not have a primary care provider, please call [phone] and they will assist you.        Care EveryWhere ID     This is your Care EveryWhere ID. This could be used by other organizations to access your [place] medical records  [id number]        Your Vitals Were     Pulse Temperature Respirations Pulse Oximetry Breastfeeding? BMI (Body Mass Index)    80 97.9  F (36.6  C) (Oral) 18 94% No 36.34 kg/m2       Blood Pressure from Last 3 Encounters:   04/16/18 124/81   02/23/18  128/85   11/29/17 128/86    Weight from Last 3 Encounters:   04/16/18 232 lb (105.2 kg)   02/23/18 233 lb (105.7 kg)   11/29/17 235 lb (106.6 kg)              Today, you had the following     No orders found for display       Primary Care Provider Office Phone # Fax #    Franki Eason [phone] [address] [facility] [place] [address]        Equal Access to Services     GERALD LLANES : Hadii aad ku hadasho Soomaali, waaxda luqadaha, qaybta kaalmada adeegyada, waxay idiin hayaan adeeg kharaida laorlando . So [facility] [phone].    ATENCIÓN: Si habla español, tiene a perrin disposición servicios gratuitos de asistencia lingüística. [facility] [phone].    We comply with applicable federal civil rights laws and [place] laws. We do not discriminate on the basis of race, color, national origin, age, disability, sex, sexual orientation, or gender identity.            Thank you!     Thank you for choosing [facility]  for your care. Our goal is always to provide you with excellent care. Hearing back from our patients is one way we can continue to improve our services. Please take a few minutes to complete the written survey that you may receive in the mail after your visit with us. Thank you!             Your Updated Medication List - Protect others around you: Learn how to safely use, store and throw away your medicines at www.disposemymeds.org.          This list is accurate as of 4/16/18  7:46 AM.  Always use your most recent med list.                   Brand Name Dispense Instructions for use Diagnosis    amLODIPine 5 MG tablet    NORVASC    90 tablet    Take 1 tablet (5 mg) by mouth daily    Hypertension goal BP (blood pressure) < 140/90       atenolol-chlorthalidone 50-25 MG per tablet    TENORETIC 50    90 tablet    Take 1 tablet by mouth daily    Hypertension goal BP (blood pressure) < 140/90       Multi-vitamin Tabs tablet   Generic drug:  multivitamin, therapeutic with minerals       1 TABLET DAILY        VENTOLIN  (90 Base) MCG/ACT Inhaler   Generic drug:  albuterol     18 g    INHALE TWO PUFFS BY MOUTH EVERY 4 HOURS AS NEEDED FOR SHORTNESS OF BREATH / DYSPNEA    Mild persistent asthma without complication

## 2018-04-17 LAB
COPATH REPORT: NORMAL
PAP: NORMAL

## 2018-04-19 LAB
FINAL DIAGNOSIS: NORMAL
HPV HR 12 DNA CVX QL NAA+PROBE: NEGATIVE
HPV16 DNA SPEC QL NAA+PROBE: NEGATIVE
HPV18 DNA SPEC QL NAA+PROBE: NEGATIVE
SPECIMEN DESCRIPTION: NORMAL
SPECIMEN SOURCE CVX/VAG CYTO: NORMAL

## 2018-05-07 ENCOUNTER — TRANSFERRED RECORDS (OUTPATIENT)
Dept: HEALTH INFORMATION MANAGEMENT | Facility: CLINIC | Age: 53
End: 2018-05-07

## 2018-05-15 ENCOUNTER — TELEPHONE (OUTPATIENT)
Dept: FAMILY MEDICINE | Facility: CLINIC | Age: 53
End: 2018-05-15

## 2018-05-15 NOTE — TELEPHONE ENCOUNTER
Please abstract the following data from this visit with this patient into the appropriate field in Epic:    Mammogram done on this date: 5/7/18 (approximately), by this group: [facility], results were normal.

## 2018-05-17 ENCOUNTER — RADIANT APPOINTMENT (OUTPATIENT)
Dept: GENERAL RADIOLOGY | Facility: CLINIC | Age: 53
End: 2018-05-17
Attending: PHYSICIAN ASSISTANT
Payer: COMMERCIAL

## 2018-05-17 ENCOUNTER — OFFICE VISIT (OUTPATIENT)
Dept: FAMILY MEDICINE | Facility: CLINIC | Age: 53
End: 2018-05-17
Payer: COMMERCIAL

## 2018-05-17 VITALS
SYSTOLIC BLOOD PRESSURE: 123 MMHG | BODY MASS INDEX: 35.63 KG/M2 | WEIGHT: 227 LBS | DIASTOLIC BLOOD PRESSURE: 83 MMHG | RESPIRATION RATE: 16 BRPM | OXYGEN SATURATION: 97 % | TEMPERATURE: 97.1 F | HEIGHT: 67 IN | HEART RATE: 77 BPM

## 2018-05-17 DIAGNOSIS — M79.672 LEFT FOOT PAIN: Primary | ICD-10-CM

## 2018-05-17 PROCEDURE — 73630 X-RAY EXAM OF FOOT: CPT | Mod: LT

## 2018-05-17 PROCEDURE — 99213 OFFICE O/P EST LOW 20 MIN: CPT | Performed by: PHYSICIAN ASSISTANT

## 2018-05-17 ASSESSMENT — PAIN SCALES - GENERAL: PAINLEVEL: EXTREME PAIN (8)

## 2018-05-17 NOTE — MR AVS SNAPSHOT
After Visit Summary   5/17/2018    Pam Mora    MRN: [id number]           Patient Information     Date Of Birth          1965        Visit Information        Provider Department      5/17/2018 8:40 AM Gustabo Duvall PA-C [facility]        Today's Diagnoses     Left foot pain    -  1      Care Instructions    ice or cold packs 20 minutes every 2-3 hrs as needed to relieve pain and swelling, for the first 2 days. Then can apply heat 20 minutes every 2-3 hrs (avoid sleeping on heating pad) there after as needed.   If you can tolerate, start non-steroidal anti-inflammatory medication like: Ibuprofen 600-800 mg three times daily or Aleve 2 tablets of over the counter strength twice a day as needed.   Tylenol can help with pain also.    Active range of motion exercises encouraged  Activity modification trying to avoid activities that cause you pain.   Follow up podiatry.     Gustabo Duvall PA-C              Follow-ups after your visit        Additional Services     PODIATRY/FOOT & ANKLE SURGERY REFERRAL       Your provider has referred you to: [facility]: [facility] [phone]   [email or web address]    Please be aware that coverage of these services is subject to the terms and limitations of your health insurance plan.  Call member services at your health plan with any benefit or coverage questions.      Please bring the following to your appointment:  >>   Any x-rays, CTs or MRIs which have been performed.  Contact the facility where they were done to arrange for  prior to your scheduled appointment.    >>   List of current medications   >>   This referral request   >>   Any documents/labs given to you for this referral                  Who to contact     If you have questions or need follow up information about today's clinic visit or your schedule please contact [facility] directly at [phone].  Normal or  "non-critical lab and imaging results will be communicated to you by MyChart, letter or phone within 4 business days after the clinic has received the results. If you do not hear from us within 7 days, please contact the clinic through [employer] or phone. If you have a critical or abnormal lab result, we will notify you by phone as soon as possible.  Submit refill requests through [employer] or call your pharmacy and they will forward the refill request to us. Please allow 3 business days for your refill to be completed.          Additional Information About Your Visit        [employer] Information     [employer] gives you secure access to your electronic health record. If you see a primary care provider, you can also send messages to your care team and make appointments. If you have questions, please call your primary care clinic.  If you do not have a primary care provider, please call [phone] and they will assist you.        Care EveryWhere ID     This is your Care EveryWhere ID. This could be used by other organizations to access your [place] medical records  [id number]        Your Vitals Were     Pulse Temperature Respirations Height Pulse Oximetry BMI (Body Mass Index)    77 97.1  F (36.2  C) (Oral) 16 5' 7\" (1.702 m) 97% 35.55 kg/m2       Blood Pressure from Last 3 Encounters:   05/17/18 123/83   04/16/18 124/81   02/23/18 128/85    Weight from Last 3 Encounters:   05/17/18 227 lb (103 kg)   04/16/18 232 lb (105.2 kg)   02/23/18 233 lb (105.7 kg)              We Performed the Following     PODIATRY/FOOT & ANKLE SURGERY REFERRAL     XR Foot Left G/E 3 Views        Primary Care Provider Office Phone # Fax #    Franki Eason [phone] [address] [address] [facility] [address]        Equal Access to Services     DUSTIN LLANES : Magaly Villavicencio, waaxda luqadaha, qaybta kaalmaraymond almodovar. So [facility] [phone].    ATENCIÓN: Si habla " español, tiene a perrin disposición servicios gratuitos de asistencia lingüística. Nicole lentz [phone].    We comply with applicable federal civil rights laws and [place] laws. We do not discriminate on the basis of race, color, national origin, age, disability, sex, sexual orientation, or gender identity.            Thank you!     Thank you for choosing [facility]  [facility]. Our goal is always to provide you with excellent care. Hearing back from our patients is one way we can continue to improve our services. Please take a few minutes to complete the written survey that you may receive in the mail after your visit with us. Thank you!             Your Updated Medication List - Protect others around you: Learn how to safely use, store and throw away your medicines at www.disposemymeds.org.          This list is accurate as of 5/17/18  9:15 AM.  Always use your most recent med list.                   Brand Name Dispense Instructions for use Diagnosis    amLODIPine 5 MG tablet    NORVASC    90 tablet    Take 1 tablet (5 mg) by mouth daily    Hypertension goal BP (blood pressure) < 140/90       atenolol-chlorthalidone 50-25 MG per tablet    TENORETIC 50    90 tablet    Take 1 tablet by mouth daily    Hypertension goal BP (blood pressure) < 140/90       Multi-vitamin Tabs tablet   Generic drug:  multivitamin, therapeutic with minerals      1 TABLET DAILY        VENTOLIN  (90 Base) MCG/ACT Inhaler   Generic drug:  albuterol     18 g    INHALE TWO PUFFS BY MOUTH EVERY 4 HOURS AS NEEDED FOR SHORTNESS OF BREATH / DYSPNEA    Mild persistent asthma without complication

## 2018-05-17 NOTE — PROGRESS NOTES
"SUBJECTIVE:   Pam Mora is a 53 year old female seen here today for her left Foot  What happened: no injury      Onset: 2 months or so     Description:   Character: aching,sore, swelling     Intensity: variable     Progression of Symptoms: worse    Accompanying Signs & Symptoms:  Other symptoms: none   History:   Previous similar pain: no       Precipitating factors:   Trauma or overuse: no     Alleviating factors:  Improved by: ice       Therapies Tried and outcome: ice helps for short amount of time     Pain with walking. Feels like a knife stabbing.   No new activities.   History of right hip pain: going on for couple months, tx: chiropractor.   No numbness/tingling or weakness.     ROS:  Constitutional, eye, ENT, skin, respiratory, cardiac, and GI are normal except as otherwise noted.    PFSH:  Past Medical, social, family histories, medications, and allergies were reviewed and updated.     OBJECTIVE:  /83  Pulse 77  Temp 97.1  F (36.2  C) (Oral)  Resp 16  Ht 5' 7\" (1.702 m)  Wt 227 lb (103 kg)  SpO2 97%  BMI 35.55 kg/m2  General:  Pam is awake, alert, and cooperative.  NAD.  Foot Exam:  Tender: none  Non-tender:proximal 5th metatarsal, midshaft 5th metatarsal, achilles tendon, Calves soft non-tender   Range of Motion:full range of motion   Pain with resisted inversion and eversion. Pain with going up on tip toe.  Mild flat feet.    X-ray left foot: neg. pending radiology       ASSESSMENT:     ICD-10-CM    1. Left foot pain M79.672 XR Foot Left G/E 3 Views     PODIATRY/FOOT & ANKLE SURGERY REFERRAL       PLAN:   Possible overuse injury.   ice or cold packs 20 minutes every 2-3 hrs as needed to relieve pain and swelling, for the first 2 days. Then can apply heat 20 minutes every 2-3 hrs (avoid sleeping on heating pad) there after as needed.   If you can tolerate, start non-steroidal anti-inflammatory medication like: Ibuprofen 600-800 mg three times daily or Aleve 2 tablets of over the " counter strength twice a day as needed.   Tylenol can help with pain also.    Active range of motion exercises encouraged  Activity modification trying to avoid activities that cause you pain.   Follow up podiatry    Gustabo Duvall PA-C

## 2018-05-17 NOTE — PATIENT INSTRUCTIONS
ice or cold packs 20 minutes every 2-3 hrs as needed to relieve pain and swelling, for the first 2 days. Then can apply heat 20 minutes every 2-3 hrs (avoid sleeping on heating pad) there after as needed.   If you can tolerate, start non-steroidal anti-inflammatory medication like: Ibuprofen 600-800 mg three times daily or Aleve 2 tablets of over the counter strength twice a day as needed.   Tylenol can help with pain also.    Active range of motion exercises encouraged  Activity modification trying to avoid activities that cause you pain.   Follow up podiatry.     Gustabo Duvall PA-C

## 2018-05-17 NOTE — NURSING NOTE
"Chief Complaint   Patient presents with     Musculoskeletal Problem     Health Maintenance     PHQ2, HIV       Initial /83  Pulse 77  Temp 97.1  F (36.2  C) (Oral)  Resp 16  Ht 5' 7\" (1.702 m)  Wt 227 lb (103 kg)  SpO2 97%  BMI 35.55 kg/m2 Estimated body mass index is 35.55 kg/(m^2) as calculated from the following:    Height as of this encounter: 5' 7\" (1.702 m).    Weight as of this encounter: 227 lb (103 kg).  Medication Reconciliation: complete  Sendy Levine CMA    "

## 2018-06-06 ENCOUNTER — OFFICE VISIT (OUTPATIENT)
Dept: PODIATRY | Facility: CLINIC | Age: 53
End: 2018-06-06
Payer: COMMERCIAL

## 2018-06-06 VITALS
HEART RATE: 76 BPM | SYSTOLIC BLOOD PRESSURE: 136 MMHG | BODY MASS INDEX: 35.55 KG/M2 | WEIGHT: 227 LBS | DIASTOLIC BLOOD PRESSURE: 80 MMHG

## 2018-06-06 DIAGNOSIS — M77.8 CAPSULITIS OF LEFT FOOT: Primary | ICD-10-CM

## 2018-06-06 PROCEDURE — 99243 OFF/OP CNSLTJ NEW/EST LOW 30: CPT | Performed by: PODIATRIST

## 2018-06-06 NOTE — PATIENT INSTRUCTIONS
We wish you continued good healing. If you have any questions or concerns, please do not hesitate to contact us at [phone]    Please remember to call and schedule a follow up appointment if one was recommended at your earliest convenience.   PODIATRY CLINIC HOURS  TELEPHONE NUMBER    Dr. Brian Curtis D.P.M [facility]    Clinics:  [facility]    [facility]   Tuesday 1PM-6PM  [place]  Wednesday 7AM-2PM  [facility]  Thursday 10AM-6PM  [place]  Friday 7AM-3PM  [place]  Specialty schedulers:   [phone] to make an appointment with any Specialty Provider.        Urgent Care locations:    [facility] Monday-Friday 5 pm - 9 pm. Saturday-Sunday 9 am -5pm    Monday-Friday 11 am - 9 pm Saturday 9 am - 5 pm     Monday-Sunday 12 noon-8PM [phone]     If you need a medication refill, please contact us you may need lab work and/or a follow up visit prior to your refill (i.e. Antifungal medications).    [employer] (secure e-mail communication and access to your chart) to send a message or to make an appointment.    If MRI needed please call [facility] at [phone]        Weight management plan: Patient was referred to their PCP to discuss a diet and exercise plan.     Patient to follow up with Primary Care provider regarding elevated blood pressure.

## 2018-06-06 NOTE — PROGRESS NOTES
S:  Patient seen in consult form Gustabo Duvall and complains of foot pain.  Points to dorsum of left 2/3/4 tarsometatarsal joint.  Has had this for 4 months.  Describes it as a burning pain.  Aggrevated by activity and relieved by rest.  Getting edema with this.  Up and down at work.  Slippers around house.  She has a bad right hip.      ROS:  A 10-point review of systems was performed and is positive for that noted in the HPI and as seen below.  All other areas are negative.        Allergies   Allergen Reactions     Polytrim [Polymyxin B-Trimethoprim]      Sulfa Drugs [Sulfa Drugs] Hives       Current Outpatient Prescriptions   Medication Sig Dispense Refill     amLODIPine (NORVASC) 5 MG tablet Take 1 tablet (5 mg) by mouth daily 90 tablet 3     atenolol-chlorthalidone (TENORETIC 50) 50-25 MG per tablet Take 1 tablet by mouth daily 90 tablet 3     MULTI-VITAMIN OR TABS 1 TABLET DAILY       VENTOLIN  (90 BASE) MCG/ACT Inhaler INHALE TWO PUFFS BY MOUTH EVERY 4 HOURS AS NEEDED FOR SHORTNESS OF BREATH / DYSPNEA 18 g 0       Patient Active Problem List   Diagnosis     Rhinitis, allergic seasonal     GERD (gastroesophageal reflux disease)     Incidental lung nodule     Hypertension goal BP (blood pressure) < 140/90     Migraines     Obesity (BMI 30-39.9)     Abnormal thyroid scan     Menopause     Chest pain     Elevated liver enzymes     Mild persistent asthma without complication     Dyslipidemia     Prediabetes       Past Medical History:   Diagnosis Date     AR (allergic rhinitis)      Asthma     Persistant, mild     Elevated fasting glucose      GERD (gastroesophageal reflux disease)      High cholesterol      HTN (hypertension)      Incidental lung nodule 05/07     LSIL (low grade squamous intraepithelial lesion) on Pap smear      Migraine      Obesity (BMI 30-39.9)        Past Surgical History:   Procedure Laterality Date     BIOPSY      THYROID , UTERUS, CERVIX     NO HISTORY OF SURGERY      Dated 10/12/10        Family History   Problem Relation Age of Onset     Neurologic Disorder Maternal Grandfather      parkinsons     DIABETES Paternal Grandfather        Social History   Substance Use Topics     Smoking status: Former Smoker     Quit date: 1/6/2008     Smokeless tobacco: Never Used     Alcohol use Yes      Comment: 3 drinks a week         O: /80 (BP Location: Right arm)  Pulse 76  Wt 227 lb (103 kg)  BMI 35.55 kg/m2.      Constitutional/ general:  Pt is in no apparent distress, appears well-nourished.  Cooperative with history and physical exam.     Psych:  The patient answered questions appropriately.  Normal affect.  Seems to have reasonable expectations, in terms of treatment.     Eyes:  Visual scanning/ tracking without deficit.     Ears:  Response to auditory stimuli is normal.  negative hearing aid devices.  Auricles in proper alignment.     Lymphatic:  Popliteal lymph nodes not enlarged.     Lungs:  Non labored breathing, non labored speech. No cough.  No audible wheezing. Even, quiet breathing.       Vascular:  positive pedal pulses bilaterally for both the DP and PT arteries.  CFT < 3 sec.  positive ankle edema.  positive pedal hair growth.    Neuro:  Alert and oriented x 3. Coordinated gait.  Light touch sensation is intact to the L4, L5, S1 distributions. No obvious deficits.  No evidence of neurological-based weakness, spasticity, or contracture in the lower extremities.      Derm: Normal texture and turgor.  No erythema, ecchymosis, or cyanosis.      Musculoskeletal:  Mildly pronated arch with weightbearing.  No forefoot or rear foot deformities noted.  MS 5/5 all compartments.  Normal ROM all fore foot and rearfoot joints.  No equinus.  Pain with palpation and ROM of the left 2/3/4 tarsometatarsal joint with 3/4 being the worse.  Edema noted here centering over 3/4 TMTJ.  No pain with stressing any muscle compartments.  No erythema or ecchymosis or masses noted.  Xrays normal.    A:  Left  2/3/4 tarsometatarsal joint capsulitis    P:  X-rays personally reviewed.  Explained to patient she has overuse of these joints and discussed how the mechanics of her foot cause this.   Discussed importance of wearing a good stiff shoe at all times to decrease symptoms and I made suggestions.  Patient will get good house shoes.  Avoid activities that bother this.  Will try arch supports.  Ice bid.  Next step cam walker if not better.   RETURN TO CLINIC PRN.  Thank you for allowing me participate in the care of this patient.        Brian Curtis DPM, FACFAS

## 2018-06-06 NOTE — MR AVS SNAPSHOT
After Visit Summary   6/6/2018    Pam Mora    MRN: [id number]           Patient Information     Date Of Birth          1965        Visit Information        Provider Department      6/6/2018 8:00 AM Brian Curtis, [facility]        Today's Diagnoses     Capsulitis of left foot    -  1      Care Instructions    We wish you continued good healing. If you have any questions or concerns, please do not hesitate to contact us at [phone]    Please remember to call and schedule a follow up appointment if one was recommended at your earliest convenience.   PODIATRY CLINIC HOURS  TELEPHONE NUMBER    Dr. Brian PEMBERTONPAURA FAC FAS    Clinics:  [facility]    Marichuy Bonilla [facility]   Tuesday 1PM-6PM  [facility]  Wednesday 7AM-2PM  [facility]  Thursday 10AM-6PM  [facility]  Friday 7AM-3PM  [facility]  Specialty schedulers:   [phone] to make an appointment with any Specialty Provider.        Urgent Care locations:    [facility] Monday-Friday 5 pm - 9 pm. Saturday-Sunday 9 am -5pm    Monday-Friday 11 am - 9 pm Saturday 9 am - 5 pm     Monday-Sunday 12 noon-8PM [phone]     If you need a medication refill, please contact us you may need lab work and/or a follow up visit prior to your refill (i.e. Antifungal medications).    [employer] (secure e-mail communication and access to your chart) to send a message or to make an appointment.    If MRI needed please call Wally Baker at [phone]        Weight management plan: Patient was referred to their PCP to discuss a diet and exercise plan.     Patient to follow up with Primary Care provider regarding elevated blood pressure.              Follow-ups after your visit        Who to contact     If you have questions or need follow up information about today's clinic visit or your  schedule please contact [facility] directly at [phone].  Normal or non-critical lab and imaging results will be communicated to you by MyChart, letter or phone within 4 business days after the clinic has received the results. If you do not hear from us within 7 days, please contact the clinic through MyChart or phone. If you have a critical or abnormal lab result, we will notify you by phone as soon as possible.  Submit refill requests through [employer] or call your pharmacy and they will forward the refill request to us. Please allow 3 business days for your refill to be completed.          Additional Information About Your Visit        [employer] Information     [employer] gives you secure access to your electronic health record. If you see a primary care provider, you can also send messages to your care team and make appointments. If you have questions, please call your primary care clinic.  If you do not have a primary care provider, please call [phone] and they will assist you.        Care EveryWhere ID     This is your Care EveryWhere ID. This could be used by other organizations to access your [facility] medical records  [id number]        Your Vitals Were     Pulse BMI (Body Mass Index)                76 35.55 kg/m2           Blood Pressure from Last 3 Encounters:   06/06/18 136/80   05/17/18 123/83   04/16/18 124/81    Weight from Last 3 Encounters:   06/06/18 227 lb (103 kg)   05/17/18 227 lb (103 kg)   04/16/18 232 lb (105.2 kg)              Today, you had the following     No orders found for display       Primary Care Provider Office Phone # Fax #    Franki Eaosn [phone] [address] [facility] [address]        Equal Access to Services     [facility] : Hadii josué Villavicencio, shannan washington, qaybraymond virgen. So [facility] [phone].    ATENCIÓN: Si habla español, tiene a perrin disposición servicios  nikki de asistencia lingüística. Nicole lentz [phone].    We comply with applicable federal civil rights laws and [place] laws. We do not discriminate on the basis of race, color, national origin, age, disability, sex, sexual orientation, or gender identity.            Thank you!     Thank you for [facility]  [facility]. Our goal is always to provide you with excellent care. Hearing back from our patients is one way we can continue to improve our services. Please take a few minutes to complete the written survey that you may receive in the mail after your visit with us. Thank you!             Your Updated Medication List - Protect others around you: Learn how to safely use, store and throw away your medicines at www.disposemymeds.org.          This list is accurate as of 6/6/18  8:58 AM.  Always use your most recent med list.                   Brand Name Dispense Instructions for use Diagnosis    amLODIPine 5 MG tablet    NORVASC    90 tablet    Take 1 tablet (5 mg) by mouth daily    Hypertension goal BP (blood pressure) < 140/90       atenolol-chlorthalidone 50-25 MG per tablet    TENORETIC 50    90 tablet    Take 1 tablet by mouth daily    Hypertension goal BP (blood pressure) < 140/90       Multi-vitamin Tabs tablet   Generic drug:  multivitamin, therapeutic with minerals      1 TABLET DAILY        VENTOLIN  (90 Base) MCG/ACT Inhaler   Generic drug:  albuterol     18 g    INHALE TWO PUFFS BY MOUTH EVERY 4 HOURS AS NEEDED FOR SHORTNESS OF BREATH / DYSPNEA    Mild persistent asthma without complication

## 2018-06-06 NOTE — LETTER
6/6/2018         RE: Pam Mora  [address] [address] [address]        Dear Colleague,    Thank you for referring your patient, Pam Mora, to the [facility]. Please see a copy of my visit note below.    S:  Patient seen in consult form Gustabo Jadiel and complains of foot pain.  Points to dorsum of left 2/3/4 tarsometatarsal joint.  Has had this for 4 months.  Describes it as a burning pain.  Aggrevated by activity and relieved by rest.  Getting edema with this.  Up and down at work.  Slippers around house.  She has a bad right hip.      ROS:  A 10-point review of systems was performed and is positive for that noted in the HPI and as seen below.  All other areas are negative.        Allergies   Allergen Reactions     Polytrim [Polymyxin B-Trimethoprim]      Sulfa Drugs [Sulfa Drugs] Hives       Current Outpatient Prescriptions   Medication Sig Dispense Refill     amLODIPine (NORVASC) 5 MG tablet Take 1 tablet (5 mg) by mouth daily 90 tablet 3     atenolol-chlorthalidone (TENORETIC 50) 50-25 MG per tablet Take 1 tablet by mouth daily 90 tablet 3     MULTI-VITAMIN OR TABS 1 TABLET DAILY       VENTOLIN  (90 BASE) MCG/ACT Inhaler INHALE TWO PUFFS BY MOUTH EVERY 4 HOURS AS NEEDED FOR SHORTNESS OF BREATH / DYSPNEA 18 g 0       Patient Active Problem List   Diagnosis     Rhinitis, allergic seasonal     GERD (gastroesophageal reflux disease)     Incidental lung nodule     Hypertension goal BP (blood pressure) < 140/90     Migraines     Obesity (BMI 30-39.9)     Abnormal thyroid scan     Menopause     Chest pain     Elevated liver enzymes     Mild persistent asthma without complication     Dyslipidemia     Prediabetes       Past Medical History:   Diagnosis Date     AR (allergic rhinitis)      Asthma     Persistant, mild     Elevated fasting glucose      GERD (gastroesophageal reflux disease)      High cholesterol      HTN (hypertension)      Incidental lung nodule  05/07     LSIL (low grade squamous intraepithelial lesion) on Pap smear      Migraine      Obesity (BMI 30-39.9)        Past Surgical History:   Procedure Laterality Date     BIOPSY      THYROID , UTERUS, CERVIX     NO HISTORY OF SURGERY      Dated 10/12/10       Family History   Problem Relation Age of Onset     Neurologic Disorder Maternal Grandfather      parkinsons     DIABETES Paternal Grandfather        Social History   Substance Use Topics     Smoking status: Former Smoker     Quit date: 1/6/2008     Smokeless tobacco: Never Used     Alcohol use Yes      Comment: 3 drinks a week         O: /80 (BP Location: Right arm)  Pulse 76  Wt 227 lb (103 kg)  BMI 35.55 kg/m2.      Constitutional/ general:  Pt is in no apparent distress, appears well-nourished.  Cooperative with history and physical exam.     Psych:  The patient answered questions appropriately.  Normal affect.  Seems to have reasonable expectations, in terms of treatment.     Eyes:  Visual scanning/ tracking without deficit.     Ears:  Response to auditory stimuli is normal.  negative hearing aid devices.  Auricles in proper alignment.     Lymphatic:  Popliteal lymph nodes not enlarged.     Lungs:  Non labored breathing, non labored speech. No cough.  No audible wheezing. Even, quiet breathing.       Vascular:  positive pedal pulses bilaterally for both the DP and PT arteries.  CFT < 3 sec.  positive ankle edema.  positive pedal hair growth.    Neuro:  Alert and oriented x 3. Coordinated gait.  Light touch sensation is intact to the L4, L5, S1 distributions. No obvious deficits.  No evidence of neurological-based weakness, spasticity, or contracture in the lower extremities.      Derm: Normal texture and turgor.  No erythema, ecchymosis, or cyanosis.      Musculoskeletal:  Mildly pronated arch with weightbearing.  No forefoot or rear foot deformities noted.  MS 5/5 all compartments.  Normal ROM all fore foot and rearfoot joints.  No equinus.   Pain with palpation and ROM of the left 2/3/4 tarsometatarsal joint with 3/4 being the worse.  Edema noted here centering over 3/4 TMTJ.  No pain with stressing any muscle compartments.  No erythema or ecchymosis or masses noted.  Xrays normal.    A:  Left 2/3/4 tarsometatarsal joint capsulitis    P:  X-rays personally reviewed.  Explained to patient she has overuse of these joints and discussed how the mechanics of her foot cause this.   Discussed importance of wearing a good stiff shoe at all times to decrease symptoms and I made suggestions.  Patient will get good house shoes.  Avoid activities that bother this.  Will try arch supports.  Ice bid.  Next step cam walker if not better.   RETURN TO CLINIC PRN.  Thank you for allowing me participate in the care of this patient.        Brian Curtis DPM, FACFAS         Again, thank you for allowing me to participate in the care of your patient.        Sincerely,        Brian Curtis DPM

## 2018-06-28 DIAGNOSIS — J45.30 MILD PERSISTENT ASTHMA WITHOUT COMPLICATION: ICD-10-CM

## 2018-06-29 RX ORDER — ALBUTEROL SULFATE 90 UG/1
AEROSOL, METERED RESPIRATORY (INHALATION)
Qty: 18 G | Refills: 0 | Status: SHIPPED | OUTPATIENT
Start: 2018-06-29 | End: 2018-11-01

## 2018-10-28 ENCOUNTER — OFFICE VISIT (OUTPATIENT)
Dept: URGENT CARE | Facility: URGENT CARE | Age: 53
End: 2018-10-28
Payer: COMMERCIAL

## 2018-10-28 ENCOUNTER — RADIANT APPOINTMENT (OUTPATIENT)
Dept: GENERAL RADIOLOGY | Facility: CLINIC | Age: 53
End: 2018-10-28
Attending: INTERNAL MEDICINE
Payer: COMMERCIAL

## 2018-10-28 VITALS
OXYGEN SATURATION: 97 % | HEART RATE: 64 BPM | WEIGHT: 237 LBS | BODY MASS INDEX: 37.12 KG/M2 | TEMPERATURE: 97.7 F | SYSTOLIC BLOOD PRESSURE: 124 MMHG | DIASTOLIC BLOOD PRESSURE: 79 MMHG

## 2018-10-28 DIAGNOSIS — M54.50 ACUTE LEFT-SIDED LOW BACK PAIN WITHOUT SCIATICA: Primary | ICD-10-CM

## 2018-10-28 DIAGNOSIS — R10.84 ABDOMINAL PAIN, GENERALIZED: ICD-10-CM

## 2018-10-28 DIAGNOSIS — M54.50 ACUTE LEFT-SIDED LOW BACK PAIN WITHOUT SCIATICA: ICD-10-CM

## 2018-10-28 LAB
ALBUMIN UR-MCNC: NEGATIVE MG/DL
APPEARANCE UR: CLEAR
BACTERIA #/AREA URNS HPF: ABNORMAL /HPF
BILIRUB UR QL STRIP: NEGATIVE
COLOR UR AUTO: YELLOW
GLUCOSE UR STRIP-MCNC: NEGATIVE MG/DL
HGB UR QL STRIP: NEGATIVE
KETONES UR STRIP-MCNC: NEGATIVE MG/DL
LEUKOCYTE ESTERASE UR QL STRIP: ABNORMAL
NITRATE UR QL: NEGATIVE
NON-SQ EPI CELLS #/AREA URNS LPF: ABNORMAL /LPF
PH UR STRIP: 7.5 PH (ref 5–7)
RBC #/AREA URNS AUTO: ABNORMAL /HPF
SOURCE: ABNORMAL
SP GR UR STRIP: 1.01 (ref 1–1.03)
UROBILINOGEN UR STRIP-ACNC: 0.2 EU/DL (ref 0.2–1)
WBC #/AREA URNS AUTO: ABNORMAL /HPF

## 2018-10-28 PROCEDURE — 99214 OFFICE O/P EST MOD 30 MIN: CPT | Performed by: INTERNAL MEDICINE

## 2018-10-28 PROCEDURE — 74019 RADEX ABDOMEN 2 VIEWS: CPT | Mod: FY

## 2018-10-28 PROCEDURE — 81001 URINALYSIS AUTO W/SCOPE: CPT | Performed by: INTERNAL MEDICINE

## 2018-10-28 RX ORDER — BISACODYL 10 MG
10 SUPPOSITORY, RECTAL RECTAL DAILY PRN
Qty: 5 SUPPOSITORY | Refills: 0 | Status: SHIPPED | OUTPATIENT
Start: 2018-10-28 | End: 2019-03-21

## 2018-10-28 NOTE — MR AVS SNAPSHOT
After Visit Summary   10/28/2018    Pam Mora    MRN: [id number]           Patient Information     Date Of Birth          1965        Visit Information        Provider Department      10/28/2018 12:35 PM Debbie Mosqueda MD [facility]        Today's Diagnoses     Acute left-sided low back pain without sciatica    -  1    Abdominal pain, generalized          Care Instructions    Take miralax full dose daily for 2-3 days.  Then, if stools are too loose, decrease to 1/2 dose daily.  If stools are still too loose, then decrease to 1/2 dose every other day until stools are no longer too loose.  You may take a full dose of miralax daily if needed, and can take it for as long as needed.    You need to take at least 1/2 dose twice a week for a month to allow time for your bowel to recover and return to normal function.             Follow-ups after your visit        Who to contact     If you have questions or need follow up information about today's clinic visit or your schedule please contact [facility] directly at [phone].  Normal or non-critical lab and imaging results will be communicated to you by [employer], letter or phone within 4 business days after the clinic has received the results. If you do not hear from us within 7 days, please contact the clinic through [employer] or phone. If you have a critical or abnormal lab result, we will notify you by phone as soon as possible.  Submit refill requests through [employer] or call your pharmacy and they will forward the refill request to us. Please allow 3 business days for your refill to be completed.          Additional Information About Your Visit        MyChart Information     [employer] gives you secure access to your electronic health record. If you see a primary care provider, you can also send messages to your care team and make appointments. If you have questions, please call your primary care clinic.  If you do  not have a primary care provider, please call [phone] and they will assist you.        Care EveryWhere ID     This is your Care EveryWhere ID. This could be used by other organizations to access your [facility] medical records  [id number]        Your Vitals Were     Pulse Temperature Pulse Oximetry BMI (Body Mass Index)          64 97.7  F (36.5  C) (Oral) 97% 37.12 kg/m2         Blood Pressure from Last 3 Encounters:   10/28/18 124/79   06/06/18 136/80   05/17/18 123/83    Weight from Last 3 Encounters:   10/28/18 237 lb (107.5 kg)   06/06/18 227 lb (103 kg)   05/17/18 227 lb (103 kg)              We Performed the Following     *UA reflex to Microscopic and Culture ([facility] [facility] (except [place] and [place])     Urine Microscopic          Today's Medication Changes          These changes are accurate as of 10/28/18  2:08 PM.  If you have any questions, ask your nurse or doctor.               Start taking these medicines.        Dose/Directions    bisacodyl 10 MG Suppository   Commonly known as:  DULCOLAX   Used for:  Abdominal pain, generalized   Started by:  Debbie Mosqueda MD        Dose:  10 mg   Place 1 suppository (10 mg) rectally daily as needed for constipation   Quantity:  5 suppository   Refills:  0            Where to get your medicines      These medications were sent to [place] [facility] [address] [place], [place] - [address]  [address] [address]     Phone:  [phone]     bisacodyl 10 MG Suppository                Primary Care Provider Office Phone # Fax #    Franki Eason [phone] [address] [address] [facility] [address]        Equal Access to Services     [facility] AH: Hadii josué browne Sobraulio, waaxda luqadaha, qaybta kaalmada adeegyada, raymond johnson. So [facility] [phone].    ATENCIÓN: Si habla español, tiene a perrin disposición servicios gratuitos de asistencia lingüística. Llame al  [phone].    We comply with applicable federal civil rights laws and [place] laws. We do not discriminate on the basis of race, color, national origin, age, disability, sex, sexual orientation, or gender identity.            Thank you!     Thank you for choosing [facility]  [facility]. Our goal is always to provide you with excellent care. Hearing back from our patients is one way we can continue to improve our services. Please take a few minutes to complete the written survey that you may receive in the mail after your visit with us. Thank you!             Your Updated Medication List - Protect others around you: Learn how to safely use, store and throw away your medicines at www.disposemymeds.org.          This list is accurate as of 10/28/18  2:08 PM.  Always use your most recent med list.                   Brand Name Dispense Instructions for use Diagnosis    amLODIPine 5 MG tablet    NORVASC    90 tablet    Take 1 tablet (5 mg) by mouth daily    Hypertension goal BP (blood pressure) < 140/90       atenolol-chlorthalidone 50-25 MG per tablet    TENORETIC 50    90 tablet    Take 1 tablet by mouth daily    Hypertension goal BP (blood pressure) < 140/90       bisacodyl 10 MG Suppository    DULCOLAX    5 suppository    Place 1 suppository (10 mg) rectally daily as needed for constipation    Abdominal pain, generalized       Multi-vitamin Tabs tablet   Generic drug:  multivitamin, therapeutic with minerals      1 TABLET DAILY        VENTOLIN  (90 Base) MCG/ACT inhaler   Generic drug:  albuterol     18 g    INHALE TWO PUFFS BY MOUTH EVERY 4 HOURS AS NEEDED FOR SHORTNESS OF BREATH / DYSPNEA    Mild persistent asthma without complication

## 2018-10-28 NOTE — PATIENT INSTRUCTIONS
Take miralax full dose daily for 2-3 days.  Then, if stools are too loose, decrease to 1/2 dose daily.  If stools are still too loose, then decrease to 1/2 dose every other day until stools are no longer too loose.  You may take a full dose of miralax daily if needed, and can take it for as long as needed.    You need to take at least 1/2 dose twice a week for a month to allow time for your bowel to recover and return to normal function.

## 2018-10-28 NOTE — PROGRESS NOTES
SUBJECTIVE:  Pam Mora is an 53 year old female who presents for left low back and side pain.  started a few days ago and was intermittent.  Has gradually worsened.  Yesterday and today the pain is constant.  Was constipated earlier in the week and took some miralax.   Pain is like an ache, about a 7 or 8/10.  Kept her up last night.  Some nausea, no vomiting.  Has had some loose stool from miralax she took for constipation but doesn't feel like the constipation is gone.  Feels bloated.  No fevers, chills, sweats.  No recent travel.  No known exposures.  No h/o kidney stones or utis since utis in high school.  Took some advil which helped pain just a little.  Urinating normally, no pain with urination and on increased frequency.  Drinking fluids well today.  Has only taken miralax about 3 times.  Last dose was 3 or 4 days ago.  The abdominal pain with get more intense at times and then subside.    PMH:   has a past medical history of AR (allergic rhinitis); Asthma; Elevated fasting glucose; GERD (gastroesophageal reflux disease); High cholesterol; HTN (hypertension); Incidental lung nodule (05/07); LSIL (low grade squamous intraepithelial lesion) on Pap smear; Migraine; and Obesity (BMI 30-39.9).  Patient Active Problem List   Diagnosis     Rhinitis, allergic seasonal     GERD (gastroesophageal reflux disease)     Incidental lung nodule     Hypertension goal BP (blood pressure) < 140/90     Migraines     Obesity (BMI 30-39.9)     Abnormal thyroid scan     Menopause     Chest pain     Elevated liver enzymes     Mild persistent asthma without complication     Dyslipidemia     Prediabetes     Social History     Social History     Marital status:      Spouse name: Terry     Number of children: 1     Years of education: 12     Occupational History      [employer]     Social History Main Topics     Smoking status: Former Smoker     Quit date: 1/6/2008     Smokeless tobacco: Never  Used     Alcohol use Yes      Comment: 3 drinks a week     Drug use: No     Sexual activity: Yes     Partners: Male     Birth control/ protection: Surgical      Comment: -vasectomy     Other Topics Concern     Parent/Sibling W/ Cabg, Mi Or Angioplasty Before 65f 55m? No     Social History Narrative    Has a son, age 15 and 2 step-daughters     Family History   Problem Relation Age of Onset     Neurologic Disorder Maternal Grandfather      parkinsons     Diabetes Paternal Grandfather        ALLERGIES:  Polytrim [polymyxin b-trimethoprim] and Sulfa drugs [sulfa drugs]    Current Outpatient Prescriptions   Medication     amLODIPine (NORVASC) 5 MG tablet     atenolol-chlorthalidone (TENORETIC 50) 50-25 MG per tablet     bisacodyl (DULCOLAX) 10 MG Suppository     MULTI-VITAMIN OR TABS     VENTOLIN  (90 Base) MCG/ACT Inhaler     No current facility-administered medications for this visit.          ROS:  ROS is done and is negative for general/constitutional, eye, ENT, Respiratory, cardiovascular, GI, , Skin, musculoskeletal except as noted elsewhere.  All other review of systems negative except as noted elsewhere.      OBJECTIVE:  /79  Pulse 64  Temp 97.7  F (36.5  C) (Oral)  Wt 237 lb (107.5 kg)  SpO2 97%  BMI 37.12 kg/m2  GENERAL APPEARANCE: Alert, in no acute distress  EYES: normal  NOSE:normal  OROPHARYNX:normal  NECK:No adenopathy,masses or thyromegaly  RESP: normal and clear to auscultation  CV:regular rate and rhythm and no murmurs, clicks, or gallops  ABDOMEN: Abdomen soft.  BS normal. Mild diffuse abdominal pain, no rebound.  No masses, organomegaly  SKIN: no ulcers, lesions or rash  MUSCULOSKELETAL:Musculoskeletal normal.  Back is non-tender.  Normal rom.      RESULTS  Results for orders placed or performed in visit on 10/28/18   *UA reflex to Microscopic and Culture ([facility] (except [place] and [place])   Result Value Ref Range    Color Urine Yellow      Appearance Urine Clear     Glucose Urine Negative NEG^Negative mg/dL    Bilirubin Urine Negative NEG^Negative    Ketones Urine Negative NEG^Negative mg/dL    Specific Gravity Urine 1.015 1.003 - 1.035    Blood Urine Negative NEG^Negative    pH Urine 7.5 (H) 5.0 - 7.0 pH    Protein Albumin Urine Negative NEG^Negative mg/dL    Urobilinogen Urine 0.2 0.2 - 1.0 EU/dL    Nitrite Urine Negative NEG^Negative    Leukocyte Esterase Urine Trace (A) NEG^Negative    Source Midstream Urine    Urine Microscopic   Result Value Ref Range    WBC Urine 0 - 5 OTO5^0 - 5 /HPF    RBC Urine O - 2 OTO2^O - 2 /HPF    Squamous Epithelial /LPF Urine Few FEW^Few /LPF    Bacteria Urine Few (A) NEG^Negative /HPF   .  Xray abdomen: some stool on left, no free air.  Await radiology reading    ASSESSMENT/PLAN:    ASSESSMENT / PLAN:  (M54.5) Acute left-sided low back pain without sciatica  (primary encounter diagnosis)  Comment: ua negative for uti and blood, doub pt has nephrolithiasis.  Suspect pain is radiating from abdomen  Plan: XR Abdomen 2 Views, *UA reflex to Microscopic         and Culture ([facility] (except        [place] and [place]), Urine Microscopic        As below    (R10.84) Abdominal pain, generalized  Comment: suspect pt has some constipation that is not fully resolved.  Currently no sign of nephrolithiasis, uti, or infectious process.  Plan: XR Abdomen 2 Views, *UA reflex to Microscopic         and Culture ([facility] (except        [place] and [place]), bisacodyl (DULCOLAX)         10 MG Suppository        Take miralax as instructed daily initially and taper as needed per my avs instructions.  Will also rx dulcolax suppository as has some low left sided stool that may pass with this.  Reviewed medication instructions and side effects. Follow up if experiences side effects.. I reviewed supportive care, expected course, and signs of concern.  Follow up as needed or if she does not improve  within 3 day(s) or if worsens in any way.  Reviewed red flag symptoms and is to go to the ER if experiences any of these.      See [facility] for orders, medications, letters, patient instructions    Debbie Mosqueda M.D.

## 2018-11-01 DIAGNOSIS — J45.30 MILD PERSISTENT ASTHMA WITHOUT COMPLICATION: ICD-10-CM

## 2018-11-01 RX ORDER — ALBUTEROL SULFATE 90 UG/1
AEROSOL, METERED RESPIRATORY (INHALATION)
Qty: 18 G | Refills: 0 | Status: SHIPPED | OUTPATIENT
Start: 2018-11-01 | End: 2019-02-05

## 2018-11-09 ENCOUNTER — TELEPHONE (OUTPATIENT)
Dept: FAMILY MEDICINE | Facility: CLINIC | Age: 53
End: 2018-11-09

## 2018-11-10 ASSESSMENT — ASTHMA QUESTIONNAIRES: ACT_TOTALSCORE: 22

## 2019-01-09 ENCOUNTER — TRANSFERRED RECORDS (OUTPATIENT)
Dept: HEALTH INFORMATION MANAGEMENT | Facility: CLINIC | Age: 54
End: 2019-01-09

## 2019-02-05 DIAGNOSIS — J45.30 MILD PERSISTENT ASTHMA WITHOUT COMPLICATION: ICD-10-CM

## 2019-02-06 RX ORDER — ALBUTEROL SULFATE 90 UG/1
AEROSOL, METERED RESPIRATORY (INHALATION)
Qty: 18 G | Refills: 0 | Status: SHIPPED | OUTPATIENT
Start: 2019-02-06 | End: 2020-05-07

## 2019-02-22 NOTE — TELEPHONE ENCOUNTER
Called and informed patient of Dr Eason's message. She said that she keeps forgetting to do that. She said that she will.Beba Almaraz MA/KARTHIK    
Please call patient:     1. I wish you had submitted your FIT card when you were in this AM.     2. Please mail that in or drop it off right away.    Thanks.    Franki Eason M.D.    
2.12

## 2019-03-05 DIAGNOSIS — I10 HYPERTENSION GOAL BP (BLOOD PRESSURE) < 140/90: ICD-10-CM

## 2019-03-05 RX ORDER — AMLODIPINE BESYLATE 5 MG/1
TABLET ORAL
Qty: 30 TABLET | Refills: 0 | Status: SHIPPED | OUTPATIENT
Start: 2019-03-05 | End: 2020-03-17

## 2019-03-05 RX ORDER — ATENOLOL 50 MG/1
50 TABLET ORAL DAILY
Qty: 30 TABLET | Refills: 0 | Status: SHIPPED | OUTPATIENT
Start: 2019-03-05 | End: 2019-03-21

## 2019-03-05 RX ORDER — CHLORTHALIDONE 25 MG/1
25 TABLET ORAL DAILY
Qty: 30 TABLET | Refills: 0 | Status: SHIPPED | OUTPATIENT
Start: 2019-03-05 | End: 2019-03-21

## 2019-03-05 NOTE — TELEPHONE ENCOUNTER
Last OV Dr. Eason: 2/23/18.  CMP abnormal.  Was to have had recheck in 2 weeks and do FIT test; not done.  4/16 saw OBDULIO Phelps for female exam      Requested Medications      Name from pharmacy: AMLODIPINE BESYLATE 5MG TABS         Will file in chart as: amLODIPine (NORVASC) 5 MG tablet    Sig: TAKE ONE TABLET BY MOUTH EVERY DAY    Disp:  90 tablet    Refills:  3    Start: 3/5/2019    Class: E-Prescribe    For: Hypertension goal BP (blood pressure) < 140/90    Requested on: 3/5/2019    Last ordered: 1 year ago by Franki Eason MD Last refill: 2/5/2019    Rx #: [id number]    Calcium Channel Blockers Protocol Failed3/5 11:11 AM   Normal serum creatinine on file in past 12 months    Blood pressure under 140/90 in past 12 months    Recent (12 mo) or future (30 days) visit within the authorizing provider's specialty    Medication is active on med list    Patient is age 18 or older    No active pregnancy on record    No positive pregnancy test in past 12 months   Protocol Details    Name from pharmacy: CHLORTHALIDONE 25MG TABS         Will file in chart as: chlorthalidone (HYGROTON) 25 MG tablet    Sig: TAKE ONE TABLET BY MOUTH EVERY DAY (ALONG WITH ATENOLOL 50MG)    Disp:  268 tablet    Refills:  0    Start: 3/5/2019    Class: E-Prescribe    Requested on: 3/5/2019    Last refill: 2/5/2019     Diuretics (Including Combos) Protocol Failed3/5 11:11 AM   Medication is active on med list    Normal serum creatinine on file in past 12 months    Normal serum potassium on file in past 12 months    Normal serum sodium on file in past 12 months    Blood pressure under 140/90 in past 12 months    Recent (12 mo) or future (30 days) visit within the authorizing provider's specialty    Patient is age 18 or older    No active pregancy on record    No positive pregnancy test in past 12 months   Protocol Details    Name from pharmacy: ATENOLOL 50MG TABS         Will file in chart as: atenolol (TENORMIN) 50 MG tablet    Sig: TAKE ONE  TABLET BY MOUTH ONCE DAILY (ALONG WITH 25MG CHLORTHALIDONE)    Disp:  268 tablet    Refills:  0    Start: 3/5/2019    Class: E-Prescribe    Requested on: 3/5/2019    Last refill: 2/5/2019     Beta-Blockers Protocol Failed3/5 11:11 AM   Medication is active on med list

## 2019-03-13 ENCOUNTER — TRANSFERRED RECORDS (OUTPATIENT)
Dept: HEALTH INFORMATION MANAGEMENT | Facility: CLINIC | Age: 54
End: 2019-03-13

## 2019-03-21 ENCOUNTER — OFFICE VISIT (OUTPATIENT)
Dept: FAMILY MEDICINE | Facility: CLINIC | Age: 54
End: 2019-03-21
Payer: COMMERCIAL

## 2019-03-21 VITALS
OXYGEN SATURATION: 96 % | DIASTOLIC BLOOD PRESSURE: 82 MMHG | SYSTOLIC BLOOD PRESSURE: 133 MMHG | HEART RATE: 76 BPM | TEMPERATURE: 97.7 F

## 2019-03-21 DIAGNOSIS — R73.03 PREDIABETES: ICD-10-CM

## 2019-03-21 DIAGNOSIS — Z12.11 SPECIAL SCREENING FOR MALIGNANT NEOPLASMS, COLON: ICD-10-CM

## 2019-03-21 DIAGNOSIS — J45.30 MILD PERSISTENT ASTHMA WITHOUT COMPLICATION: ICD-10-CM

## 2019-03-21 DIAGNOSIS — E78.5 DYSLIPIDEMIA: ICD-10-CM

## 2019-03-21 DIAGNOSIS — R74.8 ELEVATED LIVER ENZYMES: ICD-10-CM

## 2019-03-21 DIAGNOSIS — I10 HYPERTENSION GOAL BP (BLOOD PRESSURE) < 140/90: Primary | ICD-10-CM

## 2019-03-21 LAB
ALBUMIN SERPL-MCNC: 4.2 G/DL (ref 3.4–5)
ALP SERPL-CCNC: 94 U/L (ref 40–150)
ALT SERPL W P-5'-P-CCNC: 85 U/L (ref 0–50)
ANION GAP SERPL CALCULATED.3IONS-SCNC: 8 MMOL/L (ref 3–14)
AST SERPL W P-5'-P-CCNC: 56 U/L (ref 0–45)
BASOPHILS # BLD AUTO: 0 10E9/L (ref 0–0.2)
BASOPHILS NFR BLD AUTO: 0.9 %
BILIRUB SERPL-MCNC: 1.1 MG/DL (ref 0.2–1.3)
BUN SERPL-MCNC: 16 MG/DL (ref 7–30)
CALCIUM SERPL-MCNC: 9.6 MG/DL (ref 8.5–10.1)
CHLORIDE SERPL-SCNC: 103 MMOL/L (ref 94–109)
CHOLEST SERPL-MCNC: 282 MG/DL
CO2 SERPL-SCNC: 29 MMOL/L (ref 20–32)
CREAT SERPL-MCNC: 0.6 MG/DL (ref 0.52–1.04)
DIFFERENTIAL METHOD BLD: NORMAL
EOSINOPHIL # BLD AUTO: 0.3 10E9/L (ref 0–0.7)
EOSINOPHIL NFR BLD AUTO: 7.1 %
ERYTHROCYTE [DISTWIDTH] IN BLOOD BY AUTOMATED COUNT: 12.9 % (ref 10–15)
GFR SERPL CREATININE-BSD FRML MDRD: >90 ML/MIN/{1.73_M2}
GLUCOSE SERPL-MCNC: 120 MG/DL (ref 70–99)
HBA1C MFR BLD: 5.5 % (ref 0–5.6)
HCT VFR BLD AUTO: 46.7 % (ref 35–47)
HDLC SERPL-MCNC: 74 MG/DL
HGB BLD-MCNC: 15.6 G/DL (ref 11.7–15.7)
LDLC SERPL CALC-MCNC: 183 MG/DL
LYMPHOCYTES # BLD AUTO: 1.3 10E9/L (ref 0.8–5.3)
LYMPHOCYTES NFR BLD AUTO: 28.3 %
MCH RBC QN AUTO: 32.2 PG (ref 26.5–33)
MCHC RBC AUTO-ENTMCNC: 33.4 G/DL (ref 31.5–36.5)
MCV RBC AUTO: 96 FL (ref 78–100)
MONOCYTES # BLD AUTO: 0.6 10E9/L (ref 0–1.3)
MONOCYTES NFR BLD AUTO: 13.2 %
NEUTROPHILS # BLD AUTO: 2.3 10E9/L (ref 1.6–8.3)
NEUTROPHILS NFR BLD AUTO: 50.5 %
NONHDLC SERPL-MCNC: 208 MG/DL
PLATELET # BLD AUTO: 216 10E9/L (ref 150–450)
POTASSIUM SERPL-SCNC: 4.1 MMOL/L (ref 3.4–5.3)
PROT SERPL-MCNC: 8 G/DL (ref 6.8–8.8)
RBC # BLD AUTO: 4.85 10E12/L (ref 3.8–5.2)
SODIUM SERPL-SCNC: 140 MMOL/L (ref 133–144)
TRIGL SERPL-MCNC: 126 MG/DL
WBC # BLD AUTO: 4.5 10E9/L (ref 4–11)

## 2019-03-21 PROCEDURE — 80053 COMPREHEN METABOLIC PANEL: CPT | Performed by: FAMILY MEDICINE

## 2019-03-21 PROCEDURE — 36415 COLL VENOUS BLD VENIPUNCTURE: CPT | Performed by: FAMILY MEDICINE

## 2019-03-21 PROCEDURE — 83036 HEMOGLOBIN GLYCOSYLATED A1C: CPT | Performed by: FAMILY MEDICINE

## 2019-03-21 PROCEDURE — 99214 OFFICE O/P EST MOD 30 MIN: CPT | Performed by: FAMILY MEDICINE

## 2019-03-21 PROCEDURE — 80061 LIPID PANEL: CPT | Performed by: FAMILY MEDICINE

## 2019-03-21 PROCEDURE — 85025 COMPLETE CBC W/AUTO DIFF WBC: CPT | Performed by: FAMILY MEDICINE

## 2019-03-21 RX ORDER — AMLODIPINE BESYLATE 5 MG/1
5 TABLET ORAL DAILY
Qty: 90 TABLET | Refills: 3 | Status: SHIPPED | OUTPATIENT
Start: 2019-03-21 | End: 2019-04-18

## 2019-03-21 RX ORDER — ALBUTEROL SULFATE 90 UG/1
2 AEROSOL, METERED RESPIRATORY (INHALATION) EVERY 4 HOURS PRN
Qty: 18 G | Refills: 3 | Status: SHIPPED | OUTPATIENT
Start: 2019-03-21 | End: 2019-07-02

## 2019-03-21 RX ORDER — ATENOLOL 50 MG/1
50 TABLET ORAL DAILY
Qty: 90 TABLET | Refills: 3 | Status: SHIPPED | OUTPATIENT
Start: 2019-03-21 | End: 2020-03-17

## 2019-03-21 RX ORDER — CHLORTHALIDONE 25 MG/1
25 TABLET ORAL DAILY
Qty: 90 TABLET | Refills: 3 | Status: SHIPPED | OUTPATIENT
Start: 2019-03-21 | End: 2020-03-17

## 2019-03-21 NOTE — PROGRESS NOTES
HPI:    Pam is a 54 year old female here to discuss:    Right hip will be replaced in May - she will see me for pre-op.     Previous atypical chest pain - has had this for many years off and on. It was located on the right upper chest but now on the left upper chest. Described as an ache, this is random and non exertional. Lasting only a few seconds, it does not radiate anywhere. Severity can be up to 5/10. It resolves with time but can get worse with deep breathing. Not associated with nausea, shortness of breath, dizziness, palpitations. She denies anxiety/stress. She has GERD for which she takes Omeprazole. Denies recreational drug use. Not sure about chest wall pain. Denies risk factors for PE like estrogen use, long car/plane ride, FH of blood clots.  Evaluation and treatment:    Stress echo negative 8/31/05   Nuclear stress test 5/21/09 negative   Stress echo negative 10/25/17.   She inquired about coronary calcium score but I convinced her to focus on risk modification and not more testing.     Abdomina bloating, elevated liver enzymes due to fatty liver - this is chronic intermittent. She denies pain but there is discomfort. Fullness after eating small amounts. She sometimes has diarrhea mild. But generally normal BM's 1-2 per day. No nausea or vomiting. No fevers or weight loss.   Evaluation and treatment:   ultrasound 6/5/14 showing fatty liver and also two spots on the liver.    MRI on 6/11/14 showed those spots to be sparing of fatty liver.    Another ultrasound 11/7/14 to evaluate the chest pain and elevated liver enzymes, similar findings.   Has seen [facility] - I asked her to see them again for any ongoing GI symptoms.    Lab Results   Component Value Date    AST 56 03/21/2019     Lab Results   Component Value Date    ALT 85 03/21/2019     No results found for: BILICONJ   Lab Results   Component Value Date    BILITOTAL 1.1 03/21/2019     Lab Results   Component Value Date    ALBUMIN 4.2 03/21/2019      Lab Results   Component Value Date    PROTTOTAL 8.0 03/21/2019      Lab Results   Component Value Date    ALKPHOS 94 03/21/2019     Thickened endometrium and ovarian cyst -   Evaluation and treatment:   U/S as below on 6/5/14   Endometrial bx as below 7/16/14   Saw GYN on 10/13/14 but only hot flushes discussed       PELVIC ULTRASOUND COMPLETE WITH TRANSVAGINAL IMAGING 6/5/2014, 9:57 AM  HISTORY: Flatulence, eructation, and gas pain.  COMPARISON: None.  FINDINGS: Transabdominal and transvaginal imaging was performed.  Transvaginal imaging was performed to better visualize the endometrium  and adnexa. Uterus is retroverted and normal in size measuring 9.6 x  6.8 x 5.7 cm. The endometrium measures 1.2 cm in thickness. The right  ovary is normal in size and appearance. There is a cyst in the left  ovary measuring 4.0 x 3.4 x 3.2 cm. Color Doppler and Doppler waveform  analysis of the ovary shows blood flow bilaterally. No torsion. There  is small amount of free fluid in the pelvis.  IMPRESSION  IMPRESSION:  1. The endometrium is abnormally thickened for a postmenopausal woman  measuring 1.2 cm.   2. There is a 4.0 cm simple cyst in the left ovary, abnormal in a  postmenopausal woman. Followup in 3-6 months recommended.  FELICIA ASENCIO MD    Patient Name: SANDRA SMITH   MR#: [id number]   Specimen #: [id number]   Collected: 7/16/2014   Received: 7/16/2014   Reported: 7/17/2014 06:32   Ordering Phy(s): ENDER POLANCO       SPECIMEN(S):   Endometrial biopsy     FINAL DIAGNOSIS:   Endometrial biopsy:         - Inactive atrophic endometrium with stromal breakdown (see   comment)     COMMENT:   Although there is no evidence of hyperplasia in the planes examined,   there are no good-sized tissue fragments with intact glands and stroma   to adequately assess for hyperplasia.  There is no evidence of atypia or   malignancy.  Should the bleeding persist, especially if the patient has   risk factors for  hyperplasia, repeat biopsy may be recommended.     HTN - not checking at home. Controlled in clinic.   Evaluation and treatment:    Atenolol/Chlorthalidone 50/25 mg qd - no side effects.   Lisinopril stopped because it caused cough   Norvasc 5 mg qd - no side effects.   Previously lowish potassium - Takes potassium unspecified dose.   Continue current tx.      BP Readings from Last 6 Encounters:   03/21/19 133/82   10/28/18 124/79   06/06/18 136/80   05/17/18 123/83   04/16/18 124/81   02/23/18 128/85     Last Comprehensive Metabolic Panel:  Sodium   Date Value Ref Range Status   03/21/2019 140 133 - 144 mmol/L Final     Potassium   Date Value Ref Range Status   03/21/2019 4.1 3.4 - 5.3 mmol/L Final     Chloride   Date Value Ref Range Status   03/21/2019 103 94 - 109 mmol/L Final     Carbon Dioxide   Date Value Ref Range Status   03/21/2019 29 20 - 32 mmol/L Final     Anion Gap   Date Value Ref Range Status   03/21/2019 8 3 - 14 mmol/L Final     Glucose   Date Value Ref Range Status   03/21/2019 120 (H) 70 - 99 mg/dL Final     Comment:     Fasting specimen     Urea Nitrogen   Date Value Ref Range Status   03/21/2019 16 7 - 30 mg/dL Final     Creatinine   Date Value Ref Range Status   03/21/2019 0.60 0.52 - 1.04 mg/dL Final     GFR Estimate   Date Value Ref Range Status   03/21/2019 >90 >60 mL/min/[1.73_m2] Final     Comment:     Non  GFR Calc  Starting 12/18/2018, serum creatinine based estimated GFR (eGFR) will be   calculated using the Chronic Kidney Disease Epidemiology Collaboration   (CKD-EPI) equation.       Calcium   Date Value Ref Range Status   03/21/2019 9.6 8.5 - 10.1 mg/dL Final     Dyslipidemia - No h/o CAD, CVA, PAD or diabetes.  Evaluation and treatment:    Stopped Zocor for no specicif reason but based on ATP4, statin not needed anyway.   Check fasting lipids today    Recent Labs   Lab Test 03/21/19  0842 02/23/18  0926  10/22/14  0850 01/13/14  0851   CHOL 282* 229*   < > 215* 252*    HDL 74 79   < > 73 66   * 133*   < > 112 168*   TRIG 126 85   < > 151* 88   CHOLHDLRATIO  --   --   --  2.9 3.8    < > = values in this interval not displayed.     The 10-year ASCVD risk score (Van CRAMER Jr., et al., 2013) is: 2.8%    Values used to calculate the score:      Age: 54 years      Sex: Female      Is Non- : No      Diabetic: No      Tobacco smoker: No      Systolic Blood Pressure: 133 mmHg      Is BP treated: Yes      HDL Cholesterol: 74 mg/dL      Total Cholesterol: 282 mg/dL    Obesity/prediabetes -   Evaluation and treatment:    has seen nutritionist previously.    Has previously  followed with obesity clinic.    Tries to eat healthy.    Diet and exercise discussed.  Lab Results   Component Value Date    A1C 5.5 03/21/2019    A1C 5.6 02/23/2018    A1C 5.3 05/15/2013       TSH   Date Value Ref Range Status   02/23/2018 2.02 0.40 - 4.00 mU/L Final     Wt Readings from Last 5 Encounters:   10/28/18 107.5 kg (237 lb)   06/06/18 103 kg (227 lb)   05/17/18 103 kg (227 lb)   04/16/18 105.2 kg (232 lb)   02/23/18 105.7 kg (233 lb)     Mild persistent asthma - has symptoms of wheezing and shortness of breath rarely.  Evaluation and treatment:    Previously on Flovent.    Now stable on Alb prn.     ACT Total Scores 11/9/2018   ACT TOTAL SCORE -   ASTHMA ER VISITS -   ASTHMA HOSPITALIZATIONS -   ACT TOTAL SCORE (Goal Greater than or Equal to 20) 22   In the past 12 months, how many times did you visit the emergency room for your asthma without being admitted to the hospital? 0   In the past 12 months, how many times were you hospitalized overnight because of your asthma? 0     Migraines - has not had a flare since 2013. Previously on Topamax but not any more.      Lung nodule - Had 3 mm lung nodule in 2007. She denies cough, shortness of breath or other respiratory symptoms. She has previous h/o smoking. Follow up CT as below - no further evaluation needed.  CT  11/5/13  IMPRESSION:   1. The indeterminate left lung base nodule is redemonstrated on image  39. Currently measured as 0.4 cm. This is within the range of accurate  caliper placement. There is no significant interval change seen.  2. Small calcification along the anterior left lobe of thyroid.  3. No acute pulmonary disease.    Thyroid calcification - as above on CT noted incidentally. TSH 4/25/13 normal. U/S showed thyroid nodule. She saw endocrine and was told the bx was normal. check TSH today.    TSH   Date Value Ref Range Status   02/23/2018 2.02 0.40 - 4.00 mU/L Final     Skin lumps on abd -     ULTRASOUND ABDOMEN LIMITED July 10, 2015 8:31 AM  HISTORY: Palpable subcutaneous masses in the right abdomen.  FINDINGS: Ultrasound was targeted to the palpable masses in the right  upper quadrant. Each of these correlates with a superficial  circumscribed hyperechoic nodule. No internal vascularity is  demonstrated. The first measures 1.4 x 1.8 x 1.2 cm. The second  measures 1.5 x 1.9 x 0.9 cm.   IMPRESSION  IMPRESSION: Findings suggest that the palpable nodules represent  lipomas.  CECI HOLLY MD    Tobacco abuse - Has 23 pack history of smoking. Quit in 2007. We will offer lung cancer screen at age 55.     Preventive - Declines Tdap, Prevnar, Pneumovax and flu shot.    There is no immunization history on file for this patient.    Mammogram 5/7/18 neg - she does this on her own through her own and she sends a copy.    PAP - 4/16/18 - repeat in 5 years.     Lab Results   Component Value Date    PAP NIL 04/16/2018       Hep C screen: negative 1/2/17    Colon cancer screen: patient was scheduled with [facility], but didn't go to the appointment per [facility]. She declines colonoscopy at this time.     ROS:    Const: No fevers or night sweats recently.  ENT: No runny nose, sore throat or ear pain.  Resp: No cough or shortness of breath.  CV: No dizziness or cardiac palpitations.  GI: No nausea, vomiting, diarrhea or  constipation. Denies blood in stools or black stools.  : No dysuria, frequency or hematuria.    SH:    Marital status:   Kids: 3  Employment: with [employer]  Exercise: 21 day fix, using video tapes, cardio and weight, 30 min per day, until Nov, Since then walking  Tobacco: per HPI  Etoh: 2-3 per week, 2 at a time  Recreational drugs: no  Caffeine: coffee one cup per day    FH:    High cholesterol dad. Dad age 82, CABG.      Exam:    /82 (Cuff Size: Adult Large)   Pulse 76   Temp 97.7  F (36.5  C) (Oral)   SpO2 96%     Gen: Healthy appearing female in no acute distress  Eyes: Conjunctiva and sclera normal. Pupils react normally to light. No nystagmus.  Neck: No enlarged lymph nodes, thyromegally or other masses.  Lungs: Good air movement and otherwise clear.  CV: Heart RRR with no murmurs. No JVD, carotid bruits or leg edema.    Assessment and Plan - Decision Making    1. Hypertension goal BP (blood pressure) < 140/90    Per HPI    - atenolol (TENORMIN) 50 MG tablet; Take 1 tablet (50 mg) by mouth daily  Dispense: 90 tablet; Refill: 3  - chlorthalidone (HYGROTON) 25 MG tablet; Take 1 tablet (25 mg) by mouth daily  Dispense: 90 tablet; Refill: 3  - amLODIPine (NORVASC) 5 MG tablet; Take 1 tablet (5 mg) by mouth daily  Dispense: 90 tablet; Refill: 3  - CBC with platelets differential    2. Mild persistent asthma without complication    Per HPI    - albuterol (PROAIR HFA/PROVENTIL HFA/VENTOLIN HFA) 108 (90 Base) MCG/ACT inhaler; Inhale 2 puffs into the lungs every 4 hours as needed for shortness of breath / dyspnea or wheezing  Dispense: 18 g; Refill: 3    3. Dyslipidemia    Per HPI    - Lipid panel reflex to direct LDL Fasting    4. Prediabetes    Per HPI    - Hemoglobin A1c    5. Elevated liver enzymes  1. High chol    Per HPI    - Comprehensive metabolic panel      Written instructions given as follows:    Patient Instructions   1. Let me know if you want to see the GI specialist again about  the abdominal bloating.    2. I have refilled your medications.    3. See you soon for your pre-op clearance.

## 2019-03-21 NOTE — PATIENT INSTRUCTIONS
1. Let me know if you want to see the GI specialist again about the abdominal bloating.    2. I have refilled your medications.    3. See you soon for your pre-op clearance.

## 2019-03-22 ASSESSMENT — ASTHMA QUESTIONNAIRES: ACT_TOTALSCORE: 23

## 2019-03-22 NOTE — RESULT ENCOUNTER NOTE
Martha Orozco,    Your glucose and A1c are still in the pre-diabetes range. Your liver enzymes and cholesterol are also still elevated.    Weight loss via die and exercise will be important.    Also I will have our labs mail you a stool card - please bring that in when I see you for pre-op.    Regards,    Franki Eason M.D.

## 2019-04-12 NOTE — PROGRESS NOTES
[facility]  [address] [facility] [id number] [phone]  Dept: [phone]    PRE-OP EVALUATION:  Today's date: 2019    Pam Mora (: 1965) presents for pre-operative evaluation assessment as requested by Dr. Gorge Casiano.  She requires evaluation and anesthesia risk assessment prior to undergoing surgery/procedure for treatment of R hip .    Proposed Surgery/ Procedure: R hip replacement  Date of Surgery/ Procedure: 2019  Time of Surgery/ Procedure: 9:00am  Hospital/Surgical Facility: [facility]  Fax number for surgical facility: [phone]  Primary Physician: Franki Eason  Type of Anesthesia Anticipated: to be determined    Patient has a Health Care Directive or Living Will:  NO    1. NO - Do you have a history of heart attack, stroke, stent, bypass or surgery on an artery in the head, neck, heart or legs?  2. NO - Do you ever have any pain or discomfort in your chest?  3. NO - Do you have a history of  Heart Failure?  4. NO - Are you troubled by shortness of breath when: walking on the level, up a slight hill or at night?  5. NO - Do you currently have a cold, bronchitis or other respiratory infection?  6. NO - Do you have a cough, shortness of breath or wheezing?  7. NO - Do you sometimes get pains in the calves of your legs when you walk?  8. NO - Do you or anyone in your family have previous history of blood clots?  9. NO - Do you or does anyone in your family have a serious bleeding problem such as prolonged bleeding following surgeries or cuts?  10. NO - Have you ever had problems with anemia or been told to take iron pills?  11. NO - Have you had any abnormal blood loss such as black, tarry or bloody stools, or abnormal vaginal bleeding?  12. NO - Have you ever had a blood transfusion?  13. Unknown - Have you or any of your relatives ever had problems with anesthesia?  14. NO - Do you have sleep apnea, excessive snoring or daytime  drowsiness?  15. NO - Do you have any prosthetic heart valves?  16. NO - Do you have prosthetic joints?  17. NO - Is there any chance that you may be pregnant?      HPI:     HPI related to upcoming procedure: patient with severe OA of right hip, will be having total hip replacement on her right side. She has not been able to workout per patient because of the pain.       HYPERTENSION - Patient has longstanding history of HTN , currently denies any symptoms referable to elevated blood pressure. Specifically denies chest pain, palpitations, dyspnea, orthopnea, PND or peripheral edema. Blood pressure readings have been in normal range. Current medication regimen is as listed below. Patient denies any side effects of medication.                                                                                                                                       Mild asthma-has been doing well. Rarely used inhaler.     Morbid obesity-patient with BMI of 37 with comorbid of HTN and OA.                                                        .    MEDICAL HISTORY:     Patient Active Problem List    Diagnosis Date Noted     Dyslipidemia 02/23/2018     Priority: Medium     Prediabetes 02/23/2018     Priority: Medium     Mild persistent asthma without complication 11/17/2016     Priority: Medium     Chest pain 11/03/2014     Priority: Medium     Elevated liver enzymes 11/03/2014     Priority: Medium     Menopause 10/13/2014     Priority: Medium     Abnormal thyroid scan 01/13/2014     Priority: Medium     Obesity (BMI 30-39.9)      Priority: Medium     Hypertension goal BP (blood pressure) < 140/90 09/23/2011     Priority: Medium     Migraines 09/23/2011     Priority: Medium     Incidental lung nodule      Priority: Medium     Rhinitis, allergic seasonal 07/17/2009     Priority: Medium     GERD (gastroesophageal reflux disease) 07/17/2009     Priority: Medium      Past Medical History:   Diagnosis Date     AR (allergic rhinitis)       Asthma     Persistant, mild     Elevated fasting glucose      GERD (gastroesophageal reflux disease)      High cholesterol      HTN (hypertension)      Incidental lung nodule      LSIL (low grade squamous intraepithelial lesion) on Pap smear      Migraine      Obesity (BMI 30-39.9)      Past Surgical History:   Procedure Laterality Date     BIOPSY      THYROID , UTERUS, CERVIX     NO HISTORY OF SURGERY      Dated 10/12/10     Current Outpatient Medications   Medication Sig Dispense Refill     albuterol (PROAIR HFA/PROVENTIL HFA/VENTOLIN HFA) 108 (90 Base) MCG/ACT inhaler Inhale 2 puffs into the lungs every 4 hours as needed for shortness of breath / dyspnea or wheezing 18 g 3     amLODIPine (NORVASC) 5 MG tablet Take 1 tablet (5 mg) by mouth daily 90 tablet 3     amLODIPine (NORVASC) 5 MG tablet TAKE ONE TABLET BY MOUTH EVERY DAY 30 tablet 0     atenolol (TENORMIN) 50 MG tablet Take 1 tablet (50 mg) by mouth daily 90 tablet 3     atenolol-chlorthalidone (TENORETIC 50) 50-25 MG per tablet Take 1 tablet by mouth daily 90 tablet 3     chlorthalidone (HYGROTON) 25 MG tablet Take 1 tablet (25 mg) by mouth daily 90 tablet 3     MULTI-VITAMIN OR TABS 1 TABLET DAILY       VENTOLIN  (90 Base) MCG/ACT inhaler INHALE 2 PUFFS INTO THE LUNGS EVERY 4 HOURS AS NEEDED FOR SHORTNESS OF BREATH, DIFFICULTY BREATHING OR WHEEZING. 18 g 0     OTC products: NSAIDS--will stop 1 week prior    Allergies   Allergen Reactions     Polytrim [Polymyxin B-Trimethoprim]      Sulfa Drugs [Sulfa Drugs] Hives      Latex Allergy: NO    Social History     Tobacco Use     Smoking status: Former Smoker     Last attempt to quit: 2008     Years since quittin.2     Smokeless tobacco: Never Used   Substance Use Topics     Alcohol use: Yes     Comment: 3 drinks a week     History   Drug Use No       REVIEW OF SYSTEMS:   Constitutional, neuro, ENT, endocrine, pulmonary, cardiac, gastrointestinal, genitourinary, musculoskeletal,  "integument and psychiatric systems are negative, except as otherwise noted.    EXAM:   /82   Pulse 77   Temp 98.3  F (36.8  C) (Oral)   Resp 16   Ht 1.702 m (5' 7\")   Wt 108 kg (238 lb)   SpO2 96%   BMI 37.28 kg/m      GENERAL APPEARANCE: no distress     HENT: ear canals and TM's normal and nose and mouth without ulcers or lesions     NECK: no adenopathy, no asymmetry, masses, or scars and thyroid normal to palpation     RESP: lungs clear to auscultation - no rales, rhonchi or wheezes     CV: regular rates and rhythm, normal S1 S2, no S3 or S4 and no murmur, click or rub     MS: extremities normal- no gross deformities noted, no evidence of inflammation in joints, FROM in all extremities.     SKIN: no suspicious lesions or rashes     NEURO: Normal strength and tone, sensory exam grossly normal, mentation intact and speech normal     PSYCH: mentation appears normal. and affect normal/bright     LYMPHATICS: No cervical adenopathy    DIAGNOSTICS:   EKG: appears normal, NSR, normal axis, normal intervals, no acute ST/T changes c/w ischemia, no LVH by voltage criteria, unchanged from previous tracings    Recent Labs   Lab Test 03/21/19  0842 02/23/18  0926  11/20/15  0855   HGB 15.6  --   --  14.5     --   --  218    139   < > 141   POTASSIUM 4.1 3.3*   < > 4.0   CR 0.60 0.66   < > 0.78   A1C 5.5 5.6  --   --     < > = values in this interval not displayed.      Results for orders placed or performed in visit on 04/18/19   Hemoglobin   Result Value Ref Range    Hemoglobin 15.5 11.7 - 15.7 g/dL   Basic metabolic panel   Result Value Ref Range    Sodium 137 133 - 144 mmol/L    Potassium 3.6 3.4 - 5.3 mmol/L    Chloride 101 94 - 109 mmol/L    Carbon Dioxide 28 20 - 32 mmol/L    Anion Gap 8 3 - 14 mmol/L    Glucose 130 (H) 70 - 99 mg/dL    Urea Nitrogen 19 7 - 30 mg/dL    Creatinine 0.68 0.52 - 1.04 mg/dL    GFR Estimate >90 >60 mL/min/[1.73_m2]    GFR Estimate If Black >90 >60 mL/min/[1.73_m2]    " Calcium 9.7 8.5 - 10.1 mg/dL     Lab Results   Component Value Date    A1C 5.5 03/21/2019    A1C 5.6 02/23/2018    A1C 5.3 05/15/2013         IMPRESSION:   Reason for surgery/procedure: hip replacement  Diagnosis/reason for consult: r hip OA    The proposed surgical procedure is considered INTERMEDIATE risk.    REVISED CARDIAC RISK INDEX  The patient has the following serious cardiovascular risks for perioperative complications such as (MI, PE, VFib and 3  AV Block):  No serious cardiac risks  INTERPRETATION: 0 risks: Class I (very low risk - 0.4% complication rate)    The patient has the following additional risks for perioperative complications:  Morbid obesity      ICD-10-CM    1. Preop general physical exam Z01.818 EKG 12-lead complete w/read - Clinics   2. Primary osteoarthritis of right hip M16.11    3. Morbid obesity (H) E66.01    4. Essential hypertension I10    5. Screen for colon cancer Z12.11    6. Mild persistent asthma without complication J45.30        RECOMMENDATIONS:     --Consult hospital rounder / IM to assist post-op medical management    --Patient is to take all scheduled medications on the day of surgery EXCEPT for modifications listed below.    APPROVAL GIVEN to proceed with proposed procedure, without further diagnostic evaluation       Signed Electronically by: Debbie Herman PA-C  Agreed with above. Franki Eason M.D.      Copy of this evaluation report is provided to requesting physician.    [facility] Preop Guidelines    Revised Cardiac Risk Index

## 2019-04-18 ENCOUNTER — OFFICE VISIT (OUTPATIENT)
Dept: FAMILY MEDICINE | Facility: CLINIC | Age: 54
End: 2019-04-18
Payer: COMMERCIAL

## 2019-04-18 VITALS
TEMPERATURE: 98.3 F | HEIGHT: 67 IN | DIASTOLIC BLOOD PRESSURE: 82 MMHG | OXYGEN SATURATION: 96 % | BODY MASS INDEX: 37.35 KG/M2 | RESPIRATION RATE: 16 BRPM | WEIGHT: 238 LBS | HEART RATE: 77 BPM | SYSTOLIC BLOOD PRESSURE: 120 MMHG

## 2019-04-18 DIAGNOSIS — E66.01 MORBID OBESITY (H): ICD-10-CM

## 2019-04-18 DIAGNOSIS — J45.30 MILD PERSISTENT ASTHMA WITHOUT COMPLICATION: ICD-10-CM

## 2019-04-18 DIAGNOSIS — M16.11 PRIMARY OSTEOARTHRITIS OF RIGHT HIP: ICD-10-CM

## 2019-04-18 DIAGNOSIS — I10 ESSENTIAL HYPERTENSION: ICD-10-CM

## 2019-04-18 DIAGNOSIS — Z12.11 SCREEN FOR COLON CANCER: ICD-10-CM

## 2019-04-18 DIAGNOSIS — Z01.818 PREOP GENERAL PHYSICAL EXAM: Primary | ICD-10-CM

## 2019-04-18 LAB
ANION GAP SERPL CALCULATED.3IONS-SCNC: 8 MMOL/L (ref 3–14)
BUN SERPL-MCNC: 19 MG/DL (ref 7–30)
CALCIUM SERPL-MCNC: 9.7 MG/DL (ref 8.5–10.1)
CHLORIDE SERPL-SCNC: 101 MMOL/L (ref 94–109)
CO2 SERPL-SCNC: 28 MMOL/L (ref 20–32)
CREAT SERPL-MCNC: 0.68 MG/DL (ref 0.52–1.04)
GFR SERPL CREATININE-BSD FRML MDRD: >90 ML/MIN/{1.73_M2}
GLUCOSE SERPL-MCNC: 130 MG/DL (ref 70–99)
HGB BLD-MCNC: 15.5 G/DL (ref 11.7–15.7)
POTASSIUM SERPL-SCNC: 3.6 MMOL/L (ref 3.4–5.3)
SODIUM SERPL-SCNC: 137 MMOL/L (ref 133–144)

## 2019-04-18 PROCEDURE — 36415 COLL VENOUS BLD VENIPUNCTURE: CPT | Performed by: PHYSICIAN ASSISTANT

## 2019-04-18 PROCEDURE — 99215 OFFICE O/P EST HI 40 MIN: CPT | Performed by: PHYSICIAN ASSISTANT

## 2019-04-18 PROCEDURE — 80048 BASIC METABOLIC PNL TOTAL CA: CPT | Performed by: PHYSICIAN ASSISTANT

## 2019-04-18 PROCEDURE — 85018 HEMOGLOBIN: CPT | Performed by: PHYSICIAN ASSISTANT

## 2019-04-18 PROCEDURE — 93000 ELECTROCARDIOGRAM COMPLETE: CPT | Performed by: PHYSICIAN ASSISTANT

## 2019-04-18 ASSESSMENT — MIFFLIN-ST. JEOR: SCORE: 1712.19

## 2019-04-18 NOTE — LETTER
My Asthma Action Plan                GREEN ZONE   Good Control    I feel good    No cough or wheeze    Can work, sleep and play without asthma symptoms       Take your asthma control medicine every day.     1. If exercise triggers your asthma, take your rescue medication    15 minutes before exercise or sports, and    During exercise if you have asthma symptoms  2. Spacer to use with inhaler: If you have a spacer, make sure to use it with your inhaler             YELLOW ZONE Getting Worse  I have ANY of these:    I do not feel good    Cough or wheeze    Chest feels tight    Wake up at night   1. Keep taking your Green Zone medications  2. Start taking your rescue medicine:    every 20 minutes for up to 1 hour. Then every 4 hours for 24-48 hours.  3. If you stay in the Yellow Zone for more than 12-24 hours, contact your doctor.  4. If you do not return to the Green Zone in 12-24 hours or you get worse, start taking your oral steroid medicine if prescribed by your provider.           RED ZONE Medical Alert - Get Help  I have ANY of these:    I feel awful    Medicine is not helping    Breathing getting harder    Trouble walking or talking    Nose opens wide to breathe       1. Take your rescue medicine NOW  2. If your provider has prescribed an oral steroid medicine, start taking it NOW  3. Call your doctor NOW  4. If you are still in the Red Zone after 20 minutes and you have not reached your doctor:    Take your rescue medicine again and    Call 911 or go to the emergency room right away    See your regular doctor within 2 weeks of an Emergency Room or Urgent Care visit for follow-up treatment.          Annual Reminders:  Meet with Asthma Educator,  Flu Shot in the Fall, consider Pneumonia Vaccination for patients with asthma (aged 19 and older).    Pharmacy:    [facility] [place] [address] [address], [address]                      Asthma Triggers  How To  Control Things That Make Your Asthma Worse    Triggers are things that make your asthma worse.  Look at the list below to help you find your triggers and what you can do about them.  You can help prevent asthma flare-ups by staying away from your triggers.      Trigger                                                          What you can do   Cigarette Smoke  Tobacco smoke can make asthma worse. Do not allow smoking in your home, car or around you.  Be sure no one smokes at a child s day care or school.  If you smoke, ask your health care provider for ways to help you quit.  Ask family members to quit too.  Ask your health care provider for a referral to Quit Plan to help you quit smoking, or call 8-875-755-PLAN.     Colds, Flu, Bronchitis  These are common triggers of asthma. Wash your hands often.  Don t touch your eyes, nose or mouth.  Get a flu shot every year.     Dust Mites  These are tiny bugs that live in cloth or carpet. They are too small to see. Wash sheets and blankets in hot water every week.   Encase pillows and mattress in dust mite proof covers.  Avoid having carpet if you can. If you have carpet, vacuum weekly.   Use a dust mask and HEPA vacuum.   Pollen and Outdoor Mold  Some people are allergic to trees, grass, or weed pollen, or molds. Try to keep your windows closed.  Limit time out doors when pollen count is high.   Ask you health care provider about taking medicine during allergy season.     Animal Dander  Some people are allergic to skin flakes, urine or saliva from pets with fur or feathers. Keep pets with fur or feathers out of your home.    If you can t keep the pet outdoors, then keep the pet out of your bedroom.  Keep the bedroom door closed.  Keep pets off cloth furniture and away from stuffed toys.     Mice, Rats, and Cockroaches  Some people are allergic to the waste from these pests.   Cover food and garbage.  Clean up spills and food crumbs.  Store grease in the refrigerator.   Keep  food out of the bedroom.   Indoor Mold  This can be a trigger if your home has high moisture. Fix leaking faucets, pipes, or other sources of water.   Clean moldy surfaces.  Dehumidify basement if it is damp and smelly.   Smoke, Strong Odors, and Sprays  These can reduce air quality. Stay away from strong odors and sprays, such as perfume, powder, hair spray, paints, smoke incense, paint, cleaning products, candles and new carpet.   Exercise or Sports  Some people with asthma have this trigger. Be active!  Ask your doctor about taking medicine before sports or exercise to prevent symptoms.    Warm up for 5-10 minutes before and after sports or exercise.     Other Triggers of Asthma  Cold air:  Cover your nose and mouth with a scarf.  Sometimes laughing or crying can be a trigger.  Some medicines and food can trigger asthma.

## 2019-04-23 ENCOUNTER — TELEPHONE (OUTPATIENT)
Dept: FAMILY MEDICINE | Facility: CLINIC | Age: 54
End: 2019-04-23

## 2019-04-23 NOTE — RESULT ENCOUNTER NOTE
Please call patient: Dear Pam,      It was a pleasure to see you at your recent office visit.  Your test results are listed below. Fasting blood sugar is even more elevated than before. We should get an a1c lab on you again in 2 months to monitor this. Kidney function normal. hemoglobin normal no anemia.         If you have any questions or concerns, please call the clinic at [phone].    Sincerely,  Debbie Herman PA-C

## 2019-04-23 NOTE — TELEPHONE ENCOUNTER
Notes recorded by Debbie Herman PA-C on 4/23/2019 at 2:37 PM CDT  Please call patient: Dear Pam,      It was a pleasure to see you at your recent office visit.  Your test results are listed below. Fasting blood sugar is even more elevated than before. We should get an a1c lab on you again in 2 months to monitor this. Kidney function normal. hemoglobin normal no anemia.         If you have any questions or concerns, please call the clinic at [phone].    Sincerely,  Debbie Herman PA-C

## 2019-04-23 NOTE — TELEPHONE ENCOUNTER
Left message on answering machine for patient to call back.  [phone]  Nadine MARIAN, RN, CPN

## 2019-04-23 NOTE — TELEPHONE ENCOUNTER
Patient informed of result note and to recheck Hgb A1c in 2 months  Patient verbalized understanding    Nadine MARIAN, RN, CPN

## 2019-05-14 ENCOUNTER — TRANSFERRED RECORDS (OUTPATIENT)
Dept: HEALTH INFORMATION MANAGEMENT | Facility: CLINIC | Age: 54
End: 2019-05-14

## 2019-05-28 ENCOUNTER — TRANSFERRED RECORDS (OUTPATIENT)
Dept: HEALTH INFORMATION MANAGEMENT | Facility: CLINIC | Age: 54
End: 2019-05-28

## 2019-06-12 ENCOUNTER — TRANSFERRED RECORDS (OUTPATIENT)
Dept: HEALTH INFORMATION MANAGEMENT | Facility: CLINIC | Age: 54
End: 2019-06-12

## 2019-06-24 ENCOUNTER — TELEPHONE (OUTPATIENT)
Dept: FAMILY MEDICINE | Facility: CLINIC | Age: 54
End: 2019-06-24

## 2019-06-24 NOTE — TELEPHONE ENCOUNTER
Please abstract the following data from this visit with this patient into the appropriate field in Epic:    Mammogram done on this date: 6/12/19 (approximately), by this group: [facility], results were negative.

## 2019-06-25 ENCOUNTER — TRANSFERRED RECORDS (OUTPATIENT)
Dept: HEALTH INFORMATION MANAGEMENT | Facility: CLINIC | Age: 54
End: 2019-06-25

## 2019-07-02 ENCOUNTER — OFFICE VISIT (OUTPATIENT)
Dept: FAMILY MEDICINE | Facility: CLINIC | Age: 54
End: 2019-07-02
Payer: COMMERCIAL

## 2019-07-02 VITALS
SYSTOLIC BLOOD PRESSURE: 118 MMHG | DIASTOLIC BLOOD PRESSURE: 78 MMHG | HEART RATE: 76 BPM | WEIGHT: 242 LBS | OXYGEN SATURATION: 97 % | RESPIRATION RATE: 12 BRPM | HEIGHT: 67 IN | BODY MASS INDEX: 37.98 KG/M2 | TEMPERATURE: 98.5 F

## 2019-07-02 DIAGNOSIS — J45.30 MILD PERSISTENT ASTHMA WITHOUT COMPLICATION: ICD-10-CM

## 2019-07-02 DIAGNOSIS — R05.9 COUGH: ICD-10-CM

## 2019-07-02 DIAGNOSIS — J20.9 ACUTE BRONCHITIS WITH SYMPTOMS > 10 DAYS: Primary | ICD-10-CM

## 2019-07-02 PROCEDURE — 99214 OFFICE O/P EST MOD 30 MIN: CPT | Performed by: PHYSICIAN ASSISTANT

## 2019-07-02 RX ORDER — AZITHROMYCIN 250 MG/1
TABLET, FILM COATED ORAL
Qty: 6 TABLET | Refills: 0 | Status: SHIPPED | OUTPATIENT
Start: 2019-07-02 | End: 2020-05-07

## 2019-07-02 RX ORDER — ALBUTEROL SULFATE 90 UG/1
2 AEROSOL, METERED RESPIRATORY (INHALATION) EVERY 4 HOURS PRN
Qty: 18 G | Refills: 3 | Status: SHIPPED | OUTPATIENT
Start: 2019-07-02 | End: 2020-03-17

## 2019-07-02 ASSESSMENT — MIFFLIN-ST. JEOR: SCORE: 1730.33

## 2019-07-02 NOTE — PATIENT INSTRUCTIONS
Patient Education     Chronic Cough with Uncertain Cause (Adult)    Everyone has had a cough as part of the common cold, flu, or bronchitis. This kind of cough occurs along with an achy feeling, low-grade fever, nasal and sinus congestion, and a scratchy or sore throat. This usually gets better in 2 to 3 weeks. A cough that lasts longer than 3 weeks may be due to other causes. Your healthcare provider may refer to this as a chronic cough.  If your cough does not improve over the next 2 weeks, further testing may be needed. Follow up with your healthcare provider as advised. Cough suppressants may be recommended. Based on your exam today, the exact cause of your cough is not certain. Below are some common causes for persistent cough.  Smoker's cough  Smoker s cough doesn t go away. If you continue to smoke, it only gets worse. The cough is from irritation in the air passages. Talk to your healthcare provider about quitting. Medicines or nicotine-replacement products, like gum or the patch, may make quitting easier.  Postnasal drip  A cough that is worse at night may be due to postnasal drip. Excess mucus in the nose drains from the back of your nose to your throat. This triggers the cough reflex. Postnasal drip may be due to a sinus infection or allergy. Common allergens include dust, tobacco smoke (both inhaled and secondhand smoke), environmental pollutants, pollen, mold, pets, cleaning agents, room deodorizers, and chemical fumes. Over-the-counter antihistamines or decongestants may be helpful for allergies. A sinus infection may requires antibiotic treatment. See your healthcare provider if symptoms continue.  Medicines  Certain prescribed medicines can cause a chronic cough in some people:    ACE inhibitors for high blood pressure. These include benazepril, captopril, enalapril, fosinopril, lisinopril, quinapril, ramipril, and others.    Beta-blockers for high blood pressure and other conditions. These include  propranolol, atenolol, metoprolol, nadolol, and others.  Let your healthcare provider know if you are taking any of these. The chronic cough may mean your medicine needs to be changed.  Asthma  Cough may be the only sign of mild asthma. You may have tests to find out if asthma is causing your cough. You may also take asthma medicine on a trial basis.  Acid reflux (heartburn, GERD)  The esophagus is the tube that carries food from the mouth to the stomach. A valve at its lower end prevents stomach acids from flowing upward. If this valve does not work properly, acid from the stomach enters the esophagus. This may cause a burning pain in the upper abdomen or lower chest, belching, or cough. Symptoms are often worse when lying flat. Avoid eating or drinking before bedtime. Try using extra pillows to raise your upper body, or place 4-inch blocks under the head of your bed. You may try an over-the-counter (OTC) antacid or an acid-blocking medicine such as famotidine, cimetidine, ranitidine, esomeprazole, lansoprazole, or omeprazole. Stronger medicines for this condition can be prescribed by your healthcare provider. Ask your healthcare provider which OTC medicine to use. Depending on your current medicines, some OTC medicines may cause drug interactions and should be avoided.  Follow-up care  Follow up with your healthcare provider, or as advised, if your cough does not improve. Further testing may be needed.  Note: If an X-ray was taken, a specialist will review it. You will be notified of any new findings that may affect your care.  When to seek medical advice  Call your healthcare provider right away if any of these occur:    Mild wheezing or difficulty breathing    Fever of 100.4 F (38 C) or higher, or as directed by your healthcare provider    Unexpected weight loss    Coughing up large amounts of colored sputum or blood-tinged sputum    Night sweats (sheets and pajamas get soaking wet)  Call 911  Call 911 if any of  these occur:    Coughing up blood    Moderate to severe trouble breathing or wheezing  Date Last Reviewed: 6/1/2018 2000-2018 [employer]. [address] [place] [address], [place] [address]. All rights reserved. This information is not intended as a substitute for professional medical care. Always follow your healthcare professional's instructions.

## 2019-07-02 NOTE — PROGRESS NOTES
"Subjective     Pam Mora is a 54 year old female who presents to clinic today for the following health issues:    HPI   Acute Illness   Acute illness concerns: cough  Onset: month    Fever: no    Chills/Sweats: no    Headache (location?): no    Sinus Pressure:no    Conjunctivitis:  no    Ear Pain: no    Rhinorrhea: no    Congestion: no    Sore Throat: no     Cough: YES    Wheeze: YES    Decreased Appetite: no    Nausea: no    Vomiting: no    Diarrhea:  no    Dysuria/Freq.: no    Fatigue/Achiness: no    Sick/Strep Exposure: no     Therapies Tried and outcome: albuterol    Patient has asthma and has been struggling with a persistent cough for over 3 weeks, She notes that she has been managing with use of her inhaler but that in the last week her symptoms have gotten worse.   -------------------------------------    BP Readings from Last 3 Encounters:   07/02/19 118/78   04/18/19 120/82   03/21/19 133/82    Wt Readings from Last 3 Encounters:   07/02/19 109.8 kg (242 lb)   04/18/19 108 kg (238 lb)   10/28/18 107.5 kg (237 lb)                    -------------------------------------  Reviewed and updated as needed this visit by Provider  Tobacco  Allergies  Meds  Problems  Med Hx  Surg Hx  Fam Hx  Soc Hx          Review of Systems   ROS COMP: Constitutional, HEENT, cardiovascular, pulmonary, gi and gu systems are negative, except as otherwise noted.      Objective    /78   Pulse 76   Temp 98.5  F (36.9  C)   Resp 12   Ht 1.702 m (5' 7\")   Wt 109.8 kg (242 lb)   HC 12 cm (4.72\")   SpO2 97%   BMI 37.90 kg/m    Body mass index is 37.9 kg/m .  Physical Exam   GENERAL: alert and no distress  EYES: Eyes grossly normal to inspection, PERRL and conjunctivae and sclerae normal  HENT: ear canals and TM's normal, nose and mouth without ulcers or lesions  NECK: no adenopathy, no asymmetry, masses, or scars and thyroid normal to palpation  RESP: bronchial breath sounds bilaterally  CV: regular rate and " "rhythm, normal S1 S2, no S3 or S4, no murmur, click or rub, no peripheral edema and peripheral pulses strong  MS: no gross musculoskeletal defects noted, no edema  SKIN: no suspicious lesions or rashes    Diagnostic Test Results:  none         Assessment & Plan       ICD-10-CM    1. Acute bronchitis with symptoms > 10 days J20.9 azithromycin (ZITHROMAX) 250 MG tablet   2. Cough R05    3. Mild persistent asthma without complication J45.30 albuterol (PROAIR HFA/PROVENTIL HFA/VENTOLIN HFA) 108 (90 Base) MCG/ACT inhaler     I will refill albuterol inhaler and treat for bronchitis with azithromycin. If symptoms are persisting or worsening I would have her follow up in clinic for recheck/further evaluation.     BMI:   Estimated body mass index is 37.9 kg/m  as calculated from the following:    Height as of this encounter: 1.702 m (5' 7\").    Weight as of this encounter: 109.8 kg (242 lb).   Weight management plan: Patient was referred to their PCP to discuss a diet and exercise plan.        See Patient Instructions    Return if symptoms worsen or fail to improve.    Elba Gee PA-C  [facility]      "

## 2019-09-30 ENCOUNTER — TRANSFERRED RECORDS (OUTPATIENT)
Dept: HEALTH INFORMATION MANAGEMENT | Facility: CLINIC | Age: 54
End: 2019-09-30

## 2019-09-30 ENCOUNTER — APPOINTMENT (OUTPATIENT)
Age: 54
Setting detail: DERMATOLOGY
End: 2019-10-02

## 2019-09-30 VITALS — HEIGHT: 68 IN | RESPIRATION RATE: 16 BRPM | WEIGHT: 240 LBS

## 2019-09-30 DIAGNOSIS — L85.1 ACQUIRED KERATOSIS [KERATODERMA] PALMARIS ET PLANTARIS: ICD-10-CM

## 2019-09-30 DIAGNOSIS — L57.0 ACTINIC KERATOSIS: ICD-10-CM

## 2019-09-30 DIAGNOSIS — L71.8 OTHER ROSACEA: ICD-10-CM

## 2019-09-30 DIAGNOSIS — D22 MELANOCYTIC NEVI: ICD-10-CM

## 2019-09-30 DIAGNOSIS — L72.0 EPIDERMAL CYST: ICD-10-CM

## 2019-09-30 PROBLEM — D48.5 NEOPLASM OF UNCERTAIN BEHAVIOR OF SKIN: Status: ACTIVE | Noted: 2019-09-30

## 2019-09-30 PROBLEM — D22.39 MELANOCYTIC NEVI OF OTHER PARTS OF FACE: Status: ACTIVE | Noted: 2019-09-30

## 2019-09-30 PROCEDURE — OTHER PRESCRIPTION: OTHER

## 2019-09-30 PROCEDURE — OTHER BIOPSY BY SHAVE METHOD: OTHER

## 2019-09-30 PROCEDURE — OTHER COUNSELING: OTHER

## 2019-09-30 PROCEDURE — OTHER PATHOLOGY BILLING: OTHER

## 2019-09-30 PROCEDURE — OTHER PRESCRIPTION SAMPLES PROVIDED: OTHER

## 2019-09-30 PROCEDURE — OTHER LIQUID NITROGEN: OTHER

## 2019-09-30 PROCEDURE — 11102 TANGNTL BX SKIN SINGLE LES: CPT | Mod: 59

## 2019-09-30 PROCEDURE — 99214 OFFICE O/P EST MOD 30 MIN: CPT | Mod: 25

## 2019-09-30 PROCEDURE — 17004 DESTROY PREMAL LESIONS 15/>: CPT

## 2019-09-30 PROCEDURE — 88305 TISSUE EXAM BY PATHOLOGIST: CPT

## 2019-09-30 RX ORDER — FLUOROURACIL 2 G/40G
TWICE DAILY CREAM TOPICAL TWICE DAILY
Qty: 1 | Refills: 0 | Status: ERX | COMMUNITY
Start: 2019-09-30

## 2019-09-30 RX ORDER — METRONIDAZOLE 7.5 MG/G
TWICE DAILY GEL TOPICAL BID
Qty: 1 | Refills: 1 | Status: ERX | COMMUNITY
Start: 2019-09-30

## 2019-09-30 ASSESSMENT — LOCATION DETAILED DESCRIPTION DERM
LOCATION DETAILED: RIGHT PROXIMAL DORSAL THUMB
LOCATION DETAILED: RIGHT PROXIMAL DORSAL INDEX FINGER
LOCATION DETAILED: LEFT PROXIMAL DORSAL THUMB
LOCATION DETAILED: LEFT RADIAL DORSAL HAND
LOCATION DETAILED: RIGHT PLANTAR FOREFOOT OVERLYING 2ND METATARSAL
LOCATION DETAILED: LEFT INFERIOR CENTRAL MALAR CHEEK
LOCATION DETAILED: LEFT MEDIAL PLANTAR MIDFOOT
LOCATION DETAILED: RIGHT PROXIMAL RADIAL DORSAL INDEX FINGER
LOCATION DETAILED: NASAL DORSUM
LOCATION DETAILED: LEFT DORSAL INDEX FINGER METACARPOPHALANGEAL JOINT
LOCATION DETAILED: RIGHT MEDIAL MALAR CHEEK
LOCATION DETAILED: RIGHT DORSAL INDEX FINGER METACARPOPHALANGEAL JOINT
LOCATION DETAILED: SUPERIOR MID FOREHEAD
LOCATION DETAILED: LEFT DORSAL SMALL METACARPOPHALANGEAL JOINT
LOCATION DETAILED: LEFT PROXIMAL ULNAR THUMB
LOCATION DETAILED: DORSAL INTERPHALANGEAL JOINT RIGHT THUMB
LOCATION DETAILED: RIGHT RING FINGER PROXIMAL INTERPHALANGEAL JOINT
LOCATION DETAILED: RIGHT RADIAL DORSAL HAND
LOCATION DETAILED: RIGHT DISTAL PRETIBIAL REGION
LOCATION DETAILED: LEFT PROXIMAL DORSAL INDEX FINGER
LOCATION DETAILED: LEFT INFERIOR MEDIAL MALAR CHEEK
LOCATION DETAILED: RIGHT MEDIAL SUPERIOR CHEST
LOCATION DETAILED: RIGHT DORSAL MIDDLE FINGER METACARPOPHALANGEAL JOINT

## 2019-09-30 ASSESSMENT — LOCATION SIMPLE DESCRIPTION DERM
LOCATION SIMPLE: RIGHT INDEX FINGER
LOCATION SIMPLE: RIGHT THUMB
LOCATION SIMPLE: LEFT INDEX FINGER
LOCATION SIMPLE: RIGHT HAND
LOCATION SIMPLE: NOSE
LOCATION SIMPLE: SUPERIOR FOREHEAD
LOCATION SIMPLE: LEFT CHEEK
LOCATION SIMPLE: LEFT HAND
LOCATION SIMPLE: RIGHT CHEEK
LOCATION SIMPLE: CHEST
LOCATION SIMPLE: RIGHT PRETIBIAL REGION
LOCATION SIMPLE: LEFT THUMB
LOCATION SIMPLE: RIGHT PLANTAR SURFACE
LOCATION SIMPLE: RIGHT RING FINGER
LOCATION SIMPLE: LEFT PLANTAR SURFACE

## 2019-09-30 ASSESSMENT — LOCATION ZONE DERM
LOCATION ZONE: HAND
LOCATION ZONE: TRUNK
LOCATION ZONE: LEG
LOCATION ZONE: FEET
LOCATION ZONE: FACE
LOCATION ZONE: FINGER
LOCATION ZONE: NOSE

## 2019-09-30 NOTE — PROCEDURE: PATHOLOGY BILLING
Immunohistochemistry ([id number] and [id number]) billing is not performed here. Please use the Immunohistochemistry Stain Billing plan to accomplish this. Immunohistochemistry ([id number] and [id number]) billing is not performed here. Please use the Immunohistochemistry Stain Billing plan to accomplish this.

## 2019-09-30 NOTE — PROCEDURE: LIQUID NITROGEN
Detail Level: Detailed
Duration Of Freeze Thaw-Cycle (Seconds): 0
Render Post-Care Instructions In Note?: yes
Render Note In Bullet Format When Appropriate: No
Consent: - Discussed that these are a result of cumulative sun exposure.\\n- Verbal and written consent was obtained, and risks were reviewed prior to procedure today. \\n- Risks discussed include but are not limited to pain, crusting, scabbing, blistering, scarring, temporary or permanent darker or lighter pigmentary change, recurrence, incomplete resolution, and infection.
Post-Care Instructions: Patient was instructed to avoid picking at any of the treated lesions. Should any lesions treated today not be resolved within 4 weeks or if not certain, then return to clinic for re-evaluation.

## 2019-09-30 NOTE — PROCEDURE: COUNSELING
Detail Level: Simple
Detail Level: Zone
Patient Specific Counseling (Will Not Stick From Patient To Patient): Recommend to treat the thicker lesions today, but due to the amount of lesions on the dorsal hands, fingers, and chest Recommend she consider a field treatment using an Rx cream (Efudex).  Recommend she do one area at a time— start with chest then do hands.  She is to use twice daily for 2 weeks
Detail Level: Detailed

## 2019-09-30 NOTE — PROCEDURE: BIOPSY BY SHAVE METHOD
Biopsy Method: Dermablade
Was A Bandage Applied: Yes
Biopsy Type: H and E
Curettage Text: The wound bed was treated with curettage after the biopsy was performed.
Dressing: bandage
Post-Care Instructions: WOUND CARE:\\nDo NOT submerge wound in a bath, swimming pool, or hot tub for at least 1 week or for as long as there is an open wound. Gently cleanse the site daily with mild soap and water. Be careful NOT to allow a forceful stream of water to hit the biopsy site. After cleaning/showering, apply a thin layer of petrolatum ointment or Aquaphor in the wound followed by an adhesive bandage. Continue this process daily until healed. \\n\\nBLEEDING:\\nIf you develop persistent bleeding, apply firm and steady pressure over the dressing with gauze for 15 minutes. If bleeding persists, reapply pressure with an ice pack over the gauze for 15 minutes. NEVER APPLY ICE DIRECTLY TO THE SKIN. Do NOT peek under the gauze during these 15 minutes of pressure.  Call our office at [phone] if you cannot get the bleeding to stop. \\n\\nINFECTION:\\nSigns of infection may include increasing rather than decreasing pain (after the first few days), increasing redness/swelling/heat, draining pus, pink/red streaks around the wound, and fever or chills.  Please call our office immediately at [phone] if infection is suspected. It is normal to have some mild redness on or around the wound edges; this will lighten day by day and will become less tender.\\n\\nPAIN:\\nPain is usually minimal, but if needed you may take acetaminophen (Tylenol) every four hours as needed. Applying an ice pack over the dressing for 15-20 minutes every 2-3 hours will relieve swelling, lessen pain, and help minimize bruising. NEVER APPLY ICE DIRECTLY TO THE SKIN. Avoid ibuprofen (Advil, Motrin) and naproxen (Aleve) for the first 48 hours as these can increase bleeding.\\n\\nSWELLIG AND BRUISING:\\nSwelling and bruising are common and temporary, usually lasting 1 - 2 weeks. It is more common in areas treated around the eyes, hands, and feet. In the days following the procedure, swelling and bruising can be minimized by keeping the affected areas elevated when possible, reducing salty foods, and applying ice packs over the dressing for 15-20 minutes every 2-3 hours. NEVER APPLY ICE DIRECTLY TO THE SKIN.\\n\\nITCHING:\\nItchiness on and around the wound is very common and can last several days to weeks after surgery. Mild itch is normal as the wound is healing. \\n\\nNERVE CHANGES:\\nNumbness is usually temporary, but it may last for several weeks to months. You may also experience sharp pains at the wound sight as it heals.  Mild pain is normal and will gradually improve with time.\\n \\nNO SMOKING:\\nSmoking will delay the healing process. If you smoke, we recommend trying to quit or at minimum reduce how much you smoke following a procedure.
Type Of Destruction Used: Curettage
Render Post-Care Instructions In Note?: no
Detail Level: Detailed
Anesthesia Type: 2% lidocaine with epinephrine
X Size Of Lesion In Cm: 0
Cryotherapy Text: The wound bed was treated with cryotherapy after the biopsy was performed.
Anesthesia Volume In Cc (Will Not Render If 0): 0.4
Silver Nitrate Text: The wound bed was treated with silver nitrate after the biopsy was performed.
Hemostasis: Drysol
Electrodesiccation Text: The wound bed was treated with electrodesiccation after the biopsy was performed.
Electrodesiccation And Curettage Text: The wound bed was treated with electrodesiccation and curettage after the biopsy was performed.
Billing Type: Client Bill
Wound Care: Petrolatum
Depth Of Biopsy: dermis
Notification Instructions: - It can take up to 2 weeks to get a biopsy result. \\n- Please refrain from calling to request results until after 2 weeks.
Consent: - Verbal and written consent was obtained and risks were reviewed prior to procedure today. \\n- Risks discussed include but are not limited to scarring, infection, bleeding, scabbing, incomplete removal, nerve damage, pain, and allergy to anesthesia.

## 2019-10-02 ENCOUNTER — HEALTH MAINTENANCE LETTER (OUTPATIENT)
Age: 54
End: 2019-10-02

## 2019-10-10 ENCOUNTER — TRANSFERRED RECORDS (OUTPATIENT)
Dept: HEALTH INFORMATION MANAGEMENT | Facility: CLINIC | Age: 54
End: 2019-10-10

## 2019-10-23 ENCOUNTER — TRANSFERRED RECORDS (OUTPATIENT)
Dept: HEALTH INFORMATION MANAGEMENT | Facility: CLINIC | Age: 54
End: 2019-10-23

## 2019-10-24 ENCOUNTER — APPOINTMENT (OUTPATIENT)
Age: 54
Setting detail: DERMATOLOGY
End: 2019-10-27

## 2019-10-24 VITALS — HEIGHT: 68 IN | WEIGHT: 240 LBS | RESPIRATION RATE: 16 BRPM

## 2019-10-24 DIAGNOSIS — L57.0 ACTINIC KERATOSIS: ICD-10-CM

## 2019-10-24 PROCEDURE — 99213 OFFICE O/P EST LOW 20 MIN: CPT

## 2019-10-24 PROCEDURE — OTHER COUNSELING: OTHER

## 2019-10-24 PROCEDURE — OTHER PRESCRIPTION: OTHER

## 2019-10-24 RX ORDER — TRIAMCINOLONE ACETONIDE 0.1 %
TWICE DAILY OINTMENT (GRAM) TOPICAL TWICE DAILY
Qty: 1 | Refills: 0 | Status: ERX | COMMUNITY
Start: 2019-10-24

## 2019-10-24 ASSESSMENT — LOCATION DETAILED DESCRIPTION DERM
LOCATION DETAILED: LEFT ULNAR DORSAL HAND
LOCATION DETAILED: RIGHT ULNAR DORSAL HAND
LOCATION DETAILED: UPPER STERNUM

## 2019-10-24 ASSESSMENT — LOCATION SIMPLE DESCRIPTION DERM
LOCATION SIMPLE: CHEST
LOCATION SIMPLE: RIGHT HAND
LOCATION SIMPLE: LEFT HAND

## 2019-10-24 ASSESSMENT — LOCATION ZONE DERM
LOCATION ZONE: TRUNK
LOCATION ZONE: HAND

## 2019-10-24 NOTE — PROCEDURE: COUNSELING
Patient Specific Counseling (Will Not Stick From Patient To Patient): She s/p use of Efudex cream. She may d/c on the chest and start using Triamcinolone ointment twice daily for 2 weeks.  Encouraged her to continue the Efudex cream on her hands for another 2 weeks
Detail Level: Simple

## 2019-10-24 NOTE — HPI: SKIN LESION (ACTINIC KERATOSES)
How Severe Are They?: moderate
Is This A New Presentation, Or A Follow-Up?: Follow Up Actinic Keratoses
Additional History: Just finished efudex on chest, patient has not been applying any topical emollients she’s been having some discomfort and pain

## 2020-01-28 ENCOUNTER — TRANSFERRED RECORDS (OUTPATIENT)
Dept: HEALTH INFORMATION MANAGEMENT | Facility: CLINIC | Age: 55
End: 2020-01-28

## 2020-02-19 ENCOUNTER — OFFICE VISIT (OUTPATIENT)
Dept: FAMILY MEDICINE | Facility: CLINIC | Age: 55
End: 2020-02-19
Payer: COMMERCIAL

## 2020-02-19 VITALS
OXYGEN SATURATION: 98 % | SYSTOLIC BLOOD PRESSURE: 125 MMHG | DIASTOLIC BLOOD PRESSURE: 77 MMHG | TEMPERATURE: 97.5 F | HEART RATE: 68 BPM

## 2020-02-19 DIAGNOSIS — E78.5 DYSLIPIDEMIA: ICD-10-CM

## 2020-02-19 DIAGNOSIS — R91.1 LUNG NODULE: ICD-10-CM

## 2020-02-19 DIAGNOSIS — R53.83 FATIGUE, UNSPECIFIED TYPE: Primary | ICD-10-CM

## 2020-02-19 PROCEDURE — 99214 OFFICE O/P EST MOD 30 MIN: CPT | Performed by: FAMILY MEDICINE

## 2020-02-19 NOTE — PROGRESS NOTES
HPI:    Pam is a 54 year old female here to discuss:    Previous atypical chest pain - has had this for many years off and on. It was located on the right upper chest but now on the left upper chest. Described as an ache, this is random and non exertional. Lasting only a few seconds, it does not radiate anywhere. Severity can be up to 5/10. It resolves with time but can get worse with deep breathing. Not associated with nausea, shortness of breath, dizziness, palpitations. She denies anxiety/stress. She has GERD for which she takes Omeprazole. Denies recreational drug use. Not sure about chest wall pain. Denies risk factors for PE like estrogen use, long car/plane ride, FH of blood clots.  Evaluation and treatment:    Stress echo negative 8/31/05   Nuclear stress test 5/21/09 negative   Stress echo negative 10/25/17.   She inquired about coronary calcium score but I convinced her to focus on risk modification and not more testing.     Abdomina bloating, elevated liver enzymes due to fatty liver - this is chronic intermittent. She denies pain but there is discomfort. Fullness after eating small amounts. She sometimes has diarrhea mild. But generally normal BM's 1-2 per day. No nausea or vomiting. No fevers or weight loss.   Evaluation and treatment:   ultrasound 6/5/14 showing fatty liver and also two spots on the liver.    MRI on 6/11/14 showed those spots to be sparing of fatty liver.    Another ultrasound 11/7/14 to evaluate the chest pain and elevated liver enzymes, similar findings.   Has seen [facility] - I asked her to see them again for any ongoing GI symptoms.     Lab Results   Component Value Date    AST 56 03/21/2019     Lab Results   Component Value Date    ALT 85 03/21/2019     No results found for: BILICONJ   Lab Results   Component Value Date    BILITOTAL 1.1 03/21/2019     Lab Results   Component Value Date    ALBUMIN 4.2 03/21/2019     Lab Results   Component Value Date    PROTTOTAL 8.0 03/21/2019       Lab Results   Component Value Date    ALKPHOS 94 03/21/2019     Thickened endometrium and ovarian cyst -   Evaluation and treatment:   U/S as below on 6/5/14   Endometrial bx as below 7/16/14   Saw GYN on 10/13/14 but only hot flushes discussed       PELVIC ULTRASOUND COMPLETE WITH TRANSVAGINAL IMAGING 6/5/2014, 9:57 AM  HISTORY: Flatulence, eructation, and gas pain.  COMPARISON: None.  FINDINGS: Transabdominal and transvaginal imaging was performed.  Transvaginal imaging was performed to better visualize the endometrium  and adnexa. Uterus is retroverted and normal in size measuring 9.6 x  6.8 x 5.7 cm. The endometrium measures 1.2 cm in thickness. The right  ovary is normal in size and appearance. There is a cyst in the left  ovary measuring 4.0 x 3.4 x 3.2 cm. Color Doppler and Doppler waveform  analysis of the ovary shows blood flow bilaterally. No torsion. There  is small amount of free fluid in the pelvis.  IMPRESSION  IMPRESSION:  1. The endometrium is abnormally thickened for a postmenopausal woman  measuring 1.2 cm.   2. There is a 4.0 cm simple cyst in the left ovary, abnormal in a  postmenopausal woman. Followup in 3-6 months recommended.  FELICIA ASENCIO MD    Patient Name: SANDRA SMITH   MR#: [id number]   Specimen #: [id number]   Collected: 7/16/2014   Received: 7/16/2014   Reported: 7/17/2014 06:32   Ordering Phy(s): ENDER POLANCO       SPECIMEN(S):   Endometrial biopsy     FINAL DIAGNOSIS:   Endometrial biopsy:         - Inactive atrophic endometrium with stromal breakdown (see   comment)     COMMENT:   Although there is no evidence of hyperplasia in the planes examined,   there are no good-sized tissue fragments with intact glands and stroma   to adequately assess for hyperplasia.  There is no evidence of atypia or   malignancy.  Should the bleeding persist, especially if the patient has   risk factors for hyperplasia, repeat biopsy may be recommended.     HTN - not checking at home.  Controlled in clinic.   Evaluation and treatment:    Atenolol/Chlorthalidone 50/25 mg qd - no side effects.   Lisinopril stopped because it caused cough   Norvasc 5 mg qd - no side effects.   Previously lowish potassium - Takes potassium unspecified dose.   Continue current tx.    BP Readings from Last 6 Encounters:   02/19/20 125/77   07/02/19 118/78   04/18/19 120/82   03/21/19 133/82   10/28/18 124/79   06/06/18 136/80     Last Comprehensive Metabolic Panel:  Sodium   Date Value Ref Range Status   04/18/2019 137 133 - 144 mmol/L Final     Potassium   Date Value Ref Range Status   04/18/2019 3.6 3.4 - 5.3 mmol/L Final     Chloride   Date Value Ref Range Status   04/18/2019 101 94 - 109 mmol/L Final     Carbon Dioxide   Date Value Ref Range Status   04/18/2019 28 20 - 32 mmol/L Final     Anion Gap   Date Value Ref Range Status   04/18/2019 8 3 - 14 mmol/L Final     Glucose   Date Value Ref Range Status   04/18/2019 130 (H) 70 - 99 mg/dL Final     Comment:     Fasting specimen     Urea Nitrogen   Date Value Ref Range Status   04/18/2019 19 7 - 30 mg/dL Final     Creatinine   Date Value Ref Range Status   04/18/2019 0.68 0.52 - 1.04 mg/dL Final     GFR Estimate   Date Value Ref Range Status   04/18/2019 >90 >60 mL/min/[1.73_m2] Final     Comment:     Non  GFR Calc  Starting 12/18/2018, serum creatinine based estimated GFR (eGFR) will be   calculated using the Chronic Kidney Disease Epidemiology Collaboration   (CKD-EPI) equation.       Calcium   Date Value Ref Range Status   04/18/2019 9.7 8.5 - 10.1 mg/dL Final     Dyslipidemia - No h/o CAD, CVA, PAD or diabetes.  Evaluation and treatment:    Stopped Zocor for no specicif reason but based on ATP4, statin not needed anyway.   Check fasting lipids today    Recent Labs   Lab Test 03/21/19  0842 02/23/18  0926  10/22/14  0850 01/13/14  0851   CHOL 282* 229*   < > 215* 252*   HDL 74 79   < > 73 66   * 133*   < > 112 168*   TRIG 126 85   < > 151* 88    CHOLHDLRATIO  --   --   --  2.9 3.8    < > = values in this interval not displayed.     The 10-year ASCVD risk score (Van CRAMER Jr., et al., 2013) is: 2.5%    Values used to calculate the score:      Age: 54 years      Sex: Female      Is Non- : No      Diabetic: No      Tobacco smoker: No      Systolic Blood Pressure: 125 mmHg      Is BP treated: Yes      HDL Cholesterol: 74 mg/dL      Total Cholesterol: 282 mg/dL    Obesity/prediabetes -   Evaluation and treatment:    has seen nutritionist previously.    Has previously  followed with obesity clinic.    Tries to eat healthy.    Diet and exercise discussed.  Lab Results   Component Value Date    A1C 5.5 03/21/2019    A1C 5.6 02/23/2018    A1C 5.3 05/15/2013       TSH   Date Value Ref Range Status   02/23/2018 2.02 0.40 - 4.00 mU/L Final     Wt Readings from Last 5 Encounters:   07/02/19 109.8 kg (242 lb)   04/18/19 108 kg (238 lb)   10/28/18 107.5 kg (237 lb)   06/06/18 103 kg (227 lb)   05/17/18 103 kg (227 lb)     Mild persistent asthma - has symptoms of wheezing and shortness of breath rarely.  Evaluation and treatment:    Previously on Flovent.    Now stable on Alb prn.     ACT Total Scores 3/21/2019   ACT TOTAL SCORE -   ASTHMA ER VISITS -   ASTHMA HOSPITALIZATIONS -   ACT TOTAL SCORE (Goal Greater than or Equal to 20) 23   In the past 12 months, how many times did you visit the emergency room for your asthma without being admitted to the hospital? 0   In the past 12 months, how many times were you hospitalized overnight because of your asthma? 0     Migraines - has not had a flare since 2013. Previously on Topamax but not any more.      Lung nodule - Had 3 mm lung nodule in 2007. She denies cough, shortness of breath or other respiratory symptoms. She has previous h/o smoking. Follow up CT as below - no further evaluation needed. But she wanted another CT - I discussed insurance may not pay - she still wanted it so I ordered it.  CT  11/5/13    IMPRESSION:   1. The indeterminate left lung base nodule is redemonstrated on image  39. Currently measured as 0.4 cm. This is within the range of accurate  caliper placement. There is no significant interval change seen.  2. Small calcification along the anterior left lobe of thyroid.  3. No acute pulmonary disease.    Thyroid calcification - as above on CT noted incidentally. TSH 4/25/13 normal. U/S showed thyroid nodule. She saw endocrine and was told the bx was normal.     TSH   Date Value Ref Range Status   02/23/2018 2.02 0.40 - 4.00 mU/L Final     Skin lumps on abd -     ULTRASOUND ABDOMEN LIMITED July 10, 2015 8:31 AM  HISTORY: Palpable subcutaneous masses in the right abdomen.  FINDINGS: Ultrasound was targeted to the palpable masses in the right  upper quadrant. Each of these correlates with a superficial  circumscribed hyperechoic nodule. No internal vascularity is  demonstrated. The first measures 1.4 x 1.8 x 1.2 cm. The second  measures 1.5 x 1.9 x 0.9 cm.   IMPRESSION  IMPRESSION: Findings suggest that the palpable nodules represent  lipomas.  CECI HOLLY MD    Tobacco abuse - Has 23 pack history of smoking. Quit in 2007. See lung nodule above.    Surgical history -   Right hip replacement May 2019.     Preventive - Declines Tdap, Prevnar, Shingrix, Pneumovax and flu shot,     There is no immunization history on file for this patient.    Mammogram- 6/12/19 - she does this on her own through her own and she sends a copy.    PAP - 4/16/18 - repeat in 5 years.     Lab Results   Component Value Date    PAP NIL 04/16/2018     Hep C screen: negative 1/2/17    Colon cancer screen: patient was scheduled with [facility], but didn't go to the appointment per [facility]. She promises to set this up with [facility].    ROS:    Const: No fevers or night sweats recently.  ENT: No runny nose, sore throat or ear pain.  Resp: No cough or shortness of breath.  CV: No dizziness or cardiac palpitations.  GI: No  nausea, vomiting, diarrhea or constipation. Denies blood in stools or black stools.  : No dysuria, frequency or hematuria.    SH:    Marital status:   Kids: 3  Employment: with [employer]  Exercise: 21 day fix, using video tapes, cardio and weight, 30 min per day, until Nov, Since then walking  Tobacco: per HPI  Etoh: 2-3 per week, 2 at a time  Recreational drugs: no  Caffeine: coffee one cup per day    FH:    High cholesterol dad. Dad age 82, CABG.      Exam:    /77 (Cuff Size: Adult Large)   Pulse 68   Temp 97.5  F (36.4  C) (Oral)   SpO2 98%   Breastfeeding No     Gen: Healthy appearing female in no acute distress  Eyes: Conjunctiva and sclera normal. Pupils react normally to light. No nystagmus.  Neck: No enlarged lymph nodes, thyromegally or other masses.  Lungs: Good air movement and otherwise clear.  CV: Heart RRR with no murmurs. No JVD, carotid bruits or leg edema.    Assessment and Plan - Decision Making    1. Fatigue, unspecified type    Per HPI    - TSH with free T4 reflex; Future  - CBC with platelets differential; Future  - Comprehensive metabolic panel; Future  - Vitamin D Deficiency; Future    2. Lung nodule    Per HPI    - CT Chest w/o Contrast; Future    3. Dyslipidemia    Per HPI    - Lipid panel reflex to direct LDL Fasting; Future      Written instructions given as follows:    Patient Instructions   1. Set up the lung CT - [phone].     2. Set up the colonoscopy with [facility].    3. See you soon for a physical with fasting labs.

## 2020-02-19 NOTE — PATIENT INSTRUCTIONS
1. Set up the lung CT - [phone].     2. Set up the colonoscopy with [facility].    3. See you soon for a physical with fasting labs.

## 2020-02-20 ASSESSMENT — ASTHMA QUESTIONNAIRES: ACT_TOTALSCORE: 20

## 2020-02-21 ENCOUNTER — ANCILLARY PROCEDURE (OUTPATIENT)
Dept: CT IMAGING | Facility: CLINIC | Age: 55
End: 2020-02-21
Attending: FAMILY MEDICINE
Payer: COMMERCIAL

## 2020-02-21 DIAGNOSIS — R91.1 LUNG NODULE: ICD-10-CM

## 2020-02-21 PROCEDURE — 71250 CT THORAX DX C-: CPT | Mod: TC

## 2020-03-07 DIAGNOSIS — E78.5 DYSLIPIDEMIA: ICD-10-CM

## 2020-03-07 DIAGNOSIS — R53.83 FATIGUE, UNSPECIFIED TYPE: ICD-10-CM

## 2020-03-07 LAB
BASOPHILS # BLD AUTO: 0 10E9/L (ref 0–0.2)
BASOPHILS NFR BLD AUTO: 0.8 %
DIFFERENTIAL METHOD BLD: NORMAL
EOSINOPHIL # BLD AUTO: 0.4 10E9/L (ref 0–0.7)
EOSINOPHIL NFR BLD AUTO: 8.3 %
ERYTHROCYTE [DISTWIDTH] IN BLOOD BY AUTOMATED COUNT: 12.8 % (ref 10–15)
HCT VFR BLD AUTO: 45.4 % (ref 35–47)
HGB BLD-MCNC: 15.4 G/DL (ref 11.7–15.7)
LYMPHOCYTES # BLD AUTO: 1.5 10E9/L (ref 0.8–5.3)
LYMPHOCYTES NFR BLD AUTO: 27.8 %
MCH RBC QN AUTO: 31.8 PG (ref 26.5–33)
MCHC RBC AUTO-ENTMCNC: 33.9 G/DL (ref 31.5–36.5)
MCV RBC AUTO: 94 FL (ref 78–100)
MONOCYTES # BLD AUTO: 0.5 10E9/L (ref 0–1.3)
MONOCYTES NFR BLD AUTO: 9.6 %
NEUTROPHILS # BLD AUTO: 2.8 10E9/L (ref 1.6–8.3)
NEUTROPHILS NFR BLD AUTO: 53.5 %
PLATELET # BLD AUTO: 234 10E9/L (ref 150–450)
RBC # BLD AUTO: 4.84 10E12/L (ref 3.8–5.2)
WBC # BLD AUTO: 5.2 10E9/L (ref 4–11)

## 2020-03-07 PROCEDURE — 80050 GENERAL HEALTH PANEL: CPT | Performed by: FAMILY MEDICINE

## 2020-03-07 PROCEDURE — 36415 COLL VENOUS BLD VENIPUNCTURE: CPT | Performed by: FAMILY MEDICINE

## 2020-03-07 PROCEDURE — 80061 LIPID PANEL: CPT | Performed by: FAMILY MEDICINE

## 2020-03-07 PROCEDURE — 82306 VITAMIN D 25 HYDROXY: CPT | Performed by: FAMILY MEDICINE

## 2020-03-09 LAB
ALBUMIN SERPL-MCNC: 3.9 G/DL (ref 3.4–5)
ALP SERPL-CCNC: 67 U/L (ref 40–150)
ALT SERPL W P-5'-P-CCNC: 44 U/L (ref 0–50)
ANION GAP SERPL CALCULATED.3IONS-SCNC: 10 MMOL/L (ref 3–14)
AST SERPL W P-5'-P-CCNC: 30 U/L (ref 0–45)
BILIRUB SERPL-MCNC: 1 MG/DL (ref 0.2–1.3)
BUN SERPL-MCNC: 17 MG/DL (ref 7–30)
CALCIUM SERPL-MCNC: 9.3 MG/DL (ref 8.5–10.1)
CHLORIDE SERPL-SCNC: 104 MMOL/L (ref 94–109)
CHOLEST SERPL-MCNC: 246 MG/DL
CO2 SERPL-SCNC: 27 MMOL/L (ref 20–32)
CREAT SERPL-MCNC: 0.68 MG/DL (ref 0.52–1.04)
DEPRECATED CALCIDIOL+CALCIFEROL SERPL-MC: 57 UG/L (ref 20–75)
GFR SERPL CREATININE-BSD FRML MDRD: >90 ML/MIN/{1.73_M2}
GLUCOSE SERPL-MCNC: 113 MG/DL (ref 70–99)
HDLC SERPL-MCNC: 71 MG/DL
LDLC SERPL CALC-MCNC: 155 MG/DL
NONHDLC SERPL-MCNC: 175 MG/DL
POTASSIUM SERPL-SCNC: 3.5 MMOL/L (ref 3.4–5.3)
PROT SERPL-MCNC: 7.6 G/DL (ref 6.8–8.8)
SODIUM SERPL-SCNC: 141 MMOL/L (ref 133–144)
TRIGL SERPL-MCNC: 99 MG/DL
TSH SERPL DL<=0.005 MIU/L-ACNC: 1.64 MU/L (ref 0.4–4)

## 2020-03-16 ENCOUNTER — TELEPHONE (OUTPATIENT)
Dept: FAMILY MEDICINE | Facility: CLINIC | Age: 55
End: 2020-03-16

## 2020-03-16 DIAGNOSIS — J45.30 MILD PERSISTENT ASTHMA WITHOUT COMPLICATION: ICD-10-CM

## 2020-03-16 DIAGNOSIS — I10 HYPERTENSION GOAL BP (BLOOD PRESSURE) < 140/90: ICD-10-CM

## 2020-03-16 NOTE — TELEPHONE ENCOUNTER
Patient cancelled to limit exposer  Patient will need refill on medications, Amlodipine, her  Inhaler Ventolin , Chlorthalidone, Atenolol. Patient will call back to reschedule       KARTHIK Thompson

## 2020-03-16 NOTE — TELEPHONE ENCOUNTER
"Received an e refill in the [facility] Refill Pool, re routing to prescriber refill pool    Requested Prescriptions   Pending Prescriptions Disp Refills     VENTOLIN  (90 Base) MCG/ACT inhaler [Pharmacy Med Name: VENTOLIN HFA 108MCG/ACT AERS] 18 g 3     Sig: INHALE 2 PUFFS BY MOUTH EVERY 4 HOURS AS NEEDED FOR SHORTNESS OF BREATH, DIFFICULTY BREATHING OR WHEEZING  Last Written Prescription Date:  02/06/2019 #18g x 0  Last filled 02/03/2020 #18g x 3  Last office visit: 07/19/2020 PINEDA Boone     Future Office Visit:   Next 5 appointments (look out 90 days)    Mar 17, 2020  5:20 PM CDT  PHYSICAL with [facility] Eason MD  [facility] [id number] [facility] [address] [phone]                Asthma Maintenance Inhalers - Anticholinergics Passed - 3/16/2020  7:23 AM        Passed - Patient is age 12 years or older        Passed - Asthma control assessment score within normal limits in last 6 months     Please review ACT score.  ACT Total Scores 11/9/2018 3/21/2019 2/19/2020   ACT TOTAL SCORE - - -   ASTHMA ER VISITS - - -   ASTHMA HOSPITALIZATIONS - - -   ACT TOTAL SCORE (Goal Greater than or Equal to 20) 22 23 20   In the past 12 months, how many times did you visit the emergency room for your asthma without being admitted to the hospital? 0 0 0   In the past 12 months, how many times were you hospitalized overnight because of your asthma? 0 0 0                Passed - Medication is active on med list        Passed - Recent (6 mo) or future (30 days) visit within the authorizing provider's specialty     Patient had office visit in the last 6 months or has a visit in the next 30 days with authorizing provider or within the authorizing provider's specialty.  See \"Patient Info\" tab in inbasket, or \"Choose Columns\" in Meds & Orders section of the refill encounter.           Short-Acting Beta Agonist Inhalers Protocol  Passed - 3/16/2020  7:23 AM        Passed - " "Patient is age 12 or older        Passed - Asthma control assessment score within normal limits in last 6 months     Please review ACT score.           Passed - Medication is active on med list        Passed - Recent (6 mo) or future (30 days) visit within the authorizing provider's specialty     Patient had office visit in the last 6 months or has a visit in the next 30 days with authorizing provider or within the authorizing provider's specialty.  See \"Patient Info\" tab in inbasket, or \"Choose Columns\" in Meds & Orders section of the refill encounter.                 "

## 2020-03-17 RX ORDER — CHLORTHALIDONE 25 MG/1
25 TABLET ORAL DAILY
Qty: 90 TABLET | Refills: 0 | Status: SHIPPED | OUTPATIENT
Start: 2020-03-17 | End: 2020-06-25

## 2020-03-17 RX ORDER — AMLODIPINE BESYLATE 5 MG/1
5 TABLET ORAL DAILY
Qty: 90 TABLET | Refills: 0 | Status: SHIPPED | OUTPATIENT
Start: 2020-03-17 | End: 2020-06-25

## 2020-03-17 RX ORDER — ATENOLOL 50 MG/1
50 TABLET ORAL DAILY
Qty: 90 TABLET | Refills: 0 | Status: SHIPPED | OUTPATIENT
Start: 2020-03-17 | End: 2020-06-25

## 2020-03-17 RX ORDER — ALBUTEROL SULFATE 90 UG/1
2 AEROSOL, METERED RESPIRATORY (INHALATION) EVERY 4 HOURS PRN
Qty: 18 G | Refills: 3 | Status: SHIPPED | OUTPATIENT
Start: 2020-03-17 | End: 2020-09-08

## 2020-03-18 RX ORDER — ALBUTEROL SULFATE 90 UG/1
AEROSOL, METERED RESPIRATORY (INHALATION)
Qty: 18 G | Refills: 3 | OUTPATIENT
Start: 2020-03-18

## 2020-03-18 NOTE — TELEPHONE ENCOUNTER
albuterol (PROAIR HFA/PROVENTIL HFA/VENTOLIN HFA) 108 (90 Base) MCG/ACT inhaler  18 g  3  3/17/2020   No    Sig - Route: Inhale 2 puffs into the lungs every 4 hours as needed for shortness of breath / dyspnea or wheezing - Inhalation    Sent to pharmacy as: albuterol (PROAIR HFA/PROVENTIL HFA/VENTOLIN HFA) 108 (90 Base) MCG/ACT inhaler    Class: E-Prescribe    Notes to Pharmacy: Pharmacy may dispense brand covered by insurance (Proair, or proventil or ventolin or generic albuterol inhaler)    Order: [id number]    E-Prescribing Status: Receipt confirmed by pharmacy (3/17/2020  6:01 PM CDT)      Duplicate request.   Closing encounter.     Shanda Landers RN

## 2020-05-07 ENCOUNTER — VIRTUAL VISIT (OUTPATIENT)
Dept: FAMILY MEDICINE | Facility: CLINIC | Age: 55
End: 2020-05-07
Payer: COMMERCIAL

## 2020-05-07 DIAGNOSIS — J45.30 MILD PERSISTENT ASTHMA WITHOUT COMPLICATION: Primary | ICD-10-CM

## 2020-05-07 PROCEDURE — 99213 OFFICE O/P EST LOW 20 MIN: CPT | Mod: 95 | Performed by: FAMILY MEDICINE

## 2020-05-07 RX ORDER — FLUTICASONE PROPIONATE 110 UG/1
2 AEROSOL, METERED RESPIRATORY (INHALATION) 2 TIMES DAILY
Qty: 1 INHALER | Refills: 4 | Status: SHIPPED | OUTPATIENT
Start: 2020-05-07 | End: 2020-10-26

## 2020-05-07 NOTE — PROGRESS NOTES
"Pam Mora is a 55 year old female who is being evaluated via a billable video visit.      The patient has been notified of following:     \"This video visit will be conducted via a call between you and your physician/provider. We have found that certain health care needs can be provided without the need for an in-person physical exam.  This service lets us provide the care you need with a video conversation.  If a prescription is necessary we can send it directly to your pharmacy.  If lab work is needed we can place an order for that and you can then stop by our lab to have the test done at a later time.    Video visits are billed at different rates depending on your insurance coverage.  Please reach out to your insurance provider with any questions.    If during the course of the call the physician/provider feels a video visit is not appropriate, you will not be charged for this service.\"    Patient has given verbal consent for Video visit? Yes    How would you like to obtain your AVS? [place]    Patient would like the video invitation sent by: Text to cell phone:  [phone]    Will anyone else be joining your video visit? No        Subjective     Pam Mora is a 55 year old female who presents via video today for the following health issues:    Video Start Time: 8:00 AM      Please note: only the issues listed at the bottom under Assessment and Plan are addressed today. The rest of the medical problems are listed for the sake of completeness.      Previous atypical chest pain - has had this for many years off and on. It was located on the right upper chest but now on the left upper chest. Described as an ache, this is random and non exertional. Lasting only a few seconds, it does not radiate anywhere. Severity can be up to 5/10. It resolves with time but can get worse with deep breathing. Not associated with nausea, shortness of breath, dizziness, palpitations. She denies anxiety/stress. She " has GERD for which she takes Omeprazole. Denies recreational drug use. Not sure about chest wall pain. Denies risk factors for PE like estrogen use, long car/plane ride, FH of blood clots.  Evaluation and treatment:    Stress echo negative 8/31/05   Nuclear stress test 5/21/09 negative   Stress echo negative 10/25/17.   She inquired about coronary calcium score but I convinced her to focus on risk modification and not more testing.     Abdomina bloating, elevated liver enzymes due to fatty liver - this is chronic intermittent. She denies pain but there is discomfort. Fullness after eating small amounts. She sometimes has diarrhea mild. But generally normal BM's 1-2 per day. No nausea or vomiting. No fevers or weight loss.   Evaluation and treatment:   ultrasound 6/5/14 showing fatty liver and also two spots on the liver.    MRI on 6/11/14 showed those spots to be sparing of fatty liver.    Another ultrasound 11/7/14 to evaluate the chest pain and elevated liver enzymes, similar findings.   Has seen [facility] - I asked her to see them again for any ongoing GI symptoms.     Lab Results   Component Value Date    AST 56 03/21/2019     Lab Results   Component Value Date    ALT 85 03/21/2019     No results found for: BILICONJ   Lab Results   Component Value Date    BILITOTAL 1.1 03/21/2019     Lab Results   Component Value Date    ALBUMIN 4.2 03/21/2019     Lab Results   Component Value Date    PROTTOTAL 8.0 03/21/2019      Lab Results   Component Value Date    ALKPHOS 94 03/21/2019     Thickened endometrium and ovarian cyst -   Evaluation and treatment:   U/S as below on 6/5/14   Endometrial bx as below 7/16/14   Saw GYN on 10/13/14 but only hot flushes discussed       PELVIC ULTRASOUND COMPLETE WITH TRANSVAGINAL IMAGING 6/5/2014, 9:57 AM  HISTORY: Flatulence, eructation, and gas pain.  COMPARISON: None.  FINDINGS: Transabdominal and transvaginal imaging was performed.  Transvaginal imaging was performed to better visualize  the endometrium  and adnexa. Uterus is retroverted and normal in size measuring 9.6 x  6.8 x 5.7 cm. The endometrium measures 1.2 cm in thickness. The right  ovary is normal in size and appearance. There is a cyst in the left  ovary measuring 4.0 x 3.4 x 3.2 cm. Color Doppler and Doppler waveform  analysis of the ovary shows blood flow bilaterally. No torsion. There  is small amount of free fluid in the pelvis.  IMPRESSION  IMPRESSION:  1. The endometrium is abnormally thickened for a postmenopausal woman  measuring 1.2 cm.   2. There is a 4.0 cm simple cyst in the left ovary, abnormal in a  postmenopausal woman. Followup in 3-6 months recommended.  FELICIA ASENCIO MD    Patient Name: SANDRA SMITH   MR#: [id number]   Specimen #: [id number]   Collected: 7/16/2014   Received: 7/16/2014   Reported: 7/17/2014 06:32   Ordering Phy(s): ENDER POLANCO       SPECIMEN(S):   Endometrial biopsy     FINAL DIAGNOSIS:   Endometrial biopsy:         - Inactive atrophic endometrium with stromal breakdown (see   comment)     COMMENT:   Although there is no evidence of hyperplasia in the planes examined,   there are no good-sized tissue fragments with intact glands and stroma   to adequately assess for hyperplasia.  There is no evidence of atypia or   malignancy.  Should the bleeding persist, especially if the patient has   risk factors for hyperplasia, repeat biopsy may be recommended.     HTN - not checking at home. Controlled in clinic.   Evaluation and treatment:    Atenolol/Chlorthalidone 50/25 mg qd - no side effects.   Lisinopril stopped because it caused cough   Norvasc 5 mg qd - no side effects.   Previously lowish potassium - Takes potassium unspecified dose.   Continue current tx.    BP Readings from Last 6 Encounters:   02/19/20 125/77   07/02/19 118/78   04/18/19 120/82   03/21/19 133/82   10/28/18 124/79   06/06/18 136/80     Last Comprehensive Metabolic Panel:  Sodium   Date Value Ref Range Status    03/07/2020 141 133 - 144 mmol/L Final     Potassium   Date Value Ref Range Status   03/07/2020 3.5 3.4 - 5.3 mmol/L Final     Chloride   Date Value Ref Range Status   03/07/2020 104 94 - 109 mmol/L Final     Carbon Dioxide   Date Value Ref Range Status   03/07/2020 27 20 - 32 mmol/L Final     Anion Gap   Date Value Ref Range Status   03/07/2020 10 3 - 14 mmol/L Final     Glucose   Date Value Ref Range Status   03/07/2020 113 (H) 70 - 99 mg/dL Final     Comment:     Fasting specimen     Urea Nitrogen   Date Value Ref Range Status   03/07/2020 17 7 - 30 mg/dL Final     Creatinine   Date Value Ref Range Status   03/07/2020 0.68 0.52 - 1.04 mg/dL Final     GFR Estimate   Date Value Ref Range Status   03/07/2020 >90 >60 mL/min/[1.73_m2] Final     Comment:     Non  GFR Calc  Starting 12/18/2018, serum creatinine based estimated GFR (eGFR) will be   calculated using the Chronic Kidney Disease Epidemiology Collaboration   (CKD-EPI) equation.       Calcium   Date Value Ref Range Status   03/07/2020 9.3 8.5 - 10.1 mg/dL Final     Dyslipidemia - No h/o CAD, CVA, PAD or diabetes.  Evaluation and treatment:    Stopped Zocor for no specicif reason but based on ATP4, statin not needed anyway.   Check fasting lipids today    Recent Labs   Lab Test 03/21/19  0842 02/23/18  0926  10/22/14  0850 01/13/14  0851   CHOL 282* 229*   < > 215* 252*   HDL 74 79   < > 73 66   * 133*   < > 112 168*   TRIG 126 85   < > 151* 88   CHOLHDLRATIO  --   --   --  2.9 3.8    < > = values in this interval not displayed.     The 10-year ASCVD risk score (Vandex CRAMER Jr., et al., 2013) is: 2.5%    Values used to calculate the score:      Age: 55 years      Sex: Female      Is Non- : No      Diabetic: No      Tobacco smoker: No      Systolic Blood Pressure: 125 mmHg      Is BP treated: Yes      HDL Cholesterol: 71 mg/dL      Total Cholesterol: 246 mg/dL    Obesity/prediabetes -   Evaluation and treatment:    has  seen nutritionist previously.    Has previously  followed with obesity clinic.    Tries to eat healthy.    Diet and exercise discussed.  Lab Results   Component Value Date    A1C 5.5 03/21/2019    A1C 5.6 02/23/2018    A1C 5.3 05/15/2013       TSH   Date Value Ref Range Status   03/07/2020 1.64 0.40 - 4.00 mU/L Final     Wt Readings from Last 5 Encounters:   07/02/19 109.8 kg (242 lb)   04/18/19 108 kg (238 lb)   10/28/18 107.5 kg (237 lb)   06/06/18 103 kg (227 lb)   05/17/18 103 kg (227 lb)     Mild persistent asthma - baseline symptoms are wheezing and shortness of breath rarely. But lately this is happening daily, not at night.  Evaluation and treatment:    Previously on Flovent - I asked her to restart.   Now stable on Alb prn.     ACT Total Scores 5/7/2020   ACT TOTAL SCORE -   ASTHMA ER VISITS -   ASTHMA HOSPITALIZATIONS -   ACT TOTAL SCORE (Goal Greater than or Equal to 20) 15   In the past 12 months, how many times did you visit the emergency room for your asthma without being admitted to the hospital? 0   In the past 12 months, how many times were you hospitalized overnight because of your asthma? 0     Migraines - has not had a flare since 2013. Previously on Topamax but not any more.      Lung nodule - Had 3 mm lung nodule in 2007. She denies cough, shortness of breath or other respiratory symptoms. She has previous h/o smoking. Follow up CT as below - no further evaluation needed. But she wanted another CT - I discussed insurance may not pay - she still wanted it so I ordered it.  CT 11/5/13    IMPRESSION:   1. The indeterminate left lung base nodule is redemonstrated on image  39. Currently measured as 0.4 cm. This is within the range of accurate  caliper placement. There is no significant interval change seen.  2. Small calcification along the anterior left lobe of thyroid.  3. No acute pulmonary disease.    Thyroid calcification - as above on CT noted incidentally. TSH 4/25/13 normal. U/S showed  thyroid nodule. She saw endocrine and was told the bx was normal.     TSH   Date Value Ref Range Status   03/07/2020 1.64 0.40 - 4.00 mU/L Final     Skin lumps on abd -     ULTRASOUND ABDOMEN LIMITED July 10, 2015 8:31 AM  HISTORY: Palpable subcutaneous masses in the right abdomen.  FINDINGS: Ultrasound was targeted to the palpable masses in the right  upper quadrant. Each of these correlates with a superficial  circumscribed hyperechoic nodule. No internal vascularity is  demonstrated. The first measures 1.4 x 1.8 x 1.2 cm. The second  measures 1.5 x 1.9 x 0.9 cm.   IMPRESSION  IMPRESSION: Findings suggest that the palpable nodules represent  lipomas.  CECI HOLLY MD    Tobacco abuse - Has 23 pack history of smoking. Quit in 2007. See lung nodule above.    Surgical history -   Right hip replacement May 2019.     Preventive - Declines Tdap, Prevnar, Shingrix, Pneumovax and flu shot,     There is no immunization history on file for this patient.    Mammogram- 6/12/19 - she does this on her own through her own and she sends a copy.    PAP - 4/16/18 - repeat in 5 years.     Lab Results   Component Value Date    PAP NIL 04/16/2018     Hep C screen: negative 1/2/17    Colon cancer screen: patient was scheduled with [facility], but didn't go to the appointment per [facility]. She promises to set this up with [facility].    ROS:    Const: No fevers or night sweats recently.  ENT: No runny nose, sore throat or ear pain.  Resp: No cough or shortness of breath.  CV: No dizziness or cardiac palpitations.  GI: No nausea, vomiting, diarrhea or constipation. Denies blood in stools or black stools.  : No dysuria, frequency or hematuria.    SH:    Marital status:   Kids: 3  Employment: with [employer]  Exercise: 21 day fix, using video tapes, cardio and weight, 30 min per day, until Nov, Since then walking  Tobacco: per HPI  Etoh: 2-3 per week, 2 at a time  Recreational drugs: no  Caffeine: coffee one cup per  day    FH:    High cholesterol dad. Dad age 82, CABG.      Exam:    There were no vitals taken for this visit.    Gen: appeared healthy on video.    Assessment and Plan - Decision Making    1. Mild persistent asthma without complication    Per HPI    - fluticasone (FLOVENT HFA) 110 MCG/ACT inhaler; Inhale 2 puffs into the lungs 2 times daily  Dispense: 1 Inhaler; Refill: 4      Written instructions given as follows:    Patient Instructions   1. Use Flovent inhaler as prescribed - gargle after each use.    2. See you in July for a physical with fasting labs and sooner if needed.        Video-Visit Details    Type of service:  Video Visit    Video End Time: 8:12 AM    Originating Location (pt. Location): Home    Distant Location (provider location):  [facility]     Platform used for Video Visit: Leandro

## 2020-05-07 NOTE — PATIENT INSTRUCTIONS
1. Use Flovent inhaler as prescribed - gargle after each use.    2. See you in July for a physical with fasting labs and sooner if needed.

## 2020-05-08 ASSESSMENT — ASTHMA QUESTIONNAIRES: ACT_TOTALSCORE: 15

## 2020-09-03 DIAGNOSIS — J45.30 MILD PERSISTENT ASTHMA WITHOUT COMPLICATION: ICD-10-CM

## 2020-09-03 DIAGNOSIS — I10 HYPERTENSION GOAL BP (BLOOD PRESSURE) < 140/90: ICD-10-CM

## 2020-09-03 RX ORDER — ATENOLOL 50 MG/1
TABLET ORAL
Qty: 90 TABLET | Refills: 1 | Status: SHIPPED | OUTPATIENT
Start: 2020-09-03 | End: 2021-02-10

## 2020-09-03 RX ORDER — CHLORTHALIDONE 25 MG/1
TABLET ORAL
Qty: 90 TABLET | Refills: 1 | Status: SHIPPED | OUTPATIENT
Start: 2020-09-03 | End: 2021-02-10

## 2020-09-03 RX ORDER — AMLODIPINE BESYLATE 5 MG/1
TABLET ORAL
Qty: 90 TABLET | Refills: 1 | Status: SHIPPED | OUTPATIENT
Start: 2020-09-03 | End: 2021-02-10

## 2020-09-03 NOTE — TELEPHONE ENCOUNTER
Routing refill request to provider for review/approval because:  Pt ACT abnormal in May.  Loreta MARIAN, RN

## 2020-09-08 RX ORDER — ALBUTEROL SULFATE 90 UG/1
AEROSOL, METERED RESPIRATORY (INHALATION)
Qty: 18 G | Refills: 3 | Status: SHIPPED | OUTPATIENT
Start: 2020-09-08 | End: 2021-02-05

## 2020-09-15 ENCOUNTER — TRANSFERRED RECORDS (OUTPATIENT)
Dept: HEALTH INFORMATION MANAGEMENT | Facility: CLINIC | Age: 55
End: 2020-09-15

## 2020-09-16 ENCOUNTER — TRANSFERRED RECORDS (OUTPATIENT)
Dept: HEALTH INFORMATION MANAGEMENT | Facility: CLINIC | Age: 55
End: 2020-09-16

## 2020-09-17 ENCOUNTER — TRANSFERRED RECORDS (OUTPATIENT)
Dept: HEALTH INFORMATION MANAGEMENT | Facility: CLINIC | Age: 55
End: 2020-09-17

## 2020-09-21 DIAGNOSIS — I10 HYPERTENSION GOAL BP (BLOOD PRESSURE) < 140/90: ICD-10-CM

## 2020-09-21 RX ORDER — AMLODIPINE BESYLATE 5 MG/1
TABLET ORAL
Qty: 90 TABLET | Refills: 0 | OUTPATIENT
Start: 2020-09-21

## 2020-09-21 RX ORDER — CHLORTHALIDONE 25 MG/1
TABLET ORAL
Qty: 90 TABLET | Refills: 0 | OUTPATIENT
Start: 2020-09-21

## 2020-09-21 RX ORDER — ATENOLOL 50 MG/1
TABLET ORAL
Qty: 90 TABLET | Refills: 0 | OUTPATIENT
Start: 2020-09-21

## 2020-10-26 DIAGNOSIS — J45.30 MILD PERSISTENT ASTHMA WITHOUT COMPLICATION: ICD-10-CM

## 2020-10-26 NOTE — TELEPHONE ENCOUNTER
Patient calling is out of her flovent inhaler, ran out about a week ago. Needs script called in as soon as possible and script needs to be for a 90 day supply or her insurance will not cover.

## 2020-10-26 NOTE — TELEPHONE ENCOUNTER
Routing refill request to provider for review/approval because:  ACT out of range.  Loreta Hernandez BSN, RN

## 2020-10-30 RX ORDER — FLUTICASONE PROPIONATE 110 UG/1
2 AEROSOL, METERED RESPIRATORY (INHALATION) 2 TIMES DAILY
Qty: 3 INHALER | Refills: 0 | Status: SHIPPED | OUTPATIENT
Start: 2020-10-30 | End: 2021-01-19

## 2021-01-11 DIAGNOSIS — J45.30 MILD PERSISTENT ASTHMA WITHOUT COMPLICATION: ICD-10-CM

## 2021-01-12 NOTE — TELEPHONE ENCOUNTER
Routing refill request to provider for review/approval because:  There is not a current, normal ACT on file in the last 6 months.  RN unable to refill medication.    Eliana Castillo BSN, RN

## 2021-01-15 ENCOUNTER — HEALTH MAINTENANCE LETTER (OUTPATIENT)
Age: 56
End: 2021-01-15

## 2021-01-17 ENCOUNTER — TELEPHONE (OUTPATIENT)
Dept: FAMILY MEDICINE | Facility: CLINIC | Age: 56
End: 2021-01-17

## 2021-01-17 DIAGNOSIS — J45.30 MILD PERSISTENT ASTHMA WITHOUT COMPLICATION: ICD-10-CM

## 2021-01-19 RX ORDER — DEXAMETHASONE 4 MG/1
TABLET ORAL
Qty: 36 G | Refills: 3 | Status: SHIPPED | OUTPATIENT
Start: 2021-01-19 | End: 2021-03-11

## 2021-01-19 RX ORDER — DEXAMETHASONE 4 MG/1
TABLET ORAL
Qty: 36 G | OUTPATIENT
Start: 2021-01-19

## 2021-01-19 NOTE — TELEPHONE ENCOUNTER
Patient requesting refill of Flovent inhaler--until seen, Pam has scheduled appointments for Fasting labs on 02/05/21 and physical on 01/10/21

## 2021-02-02 ENCOUNTER — DOCUMENTATION ONLY (OUTPATIENT)
Dept: LAB | Facility: CLINIC | Age: 56
End: 2021-02-02

## 2021-02-02 DIAGNOSIS — J45.30 MILD PERSISTENT ASTHMA WITHOUT COMPLICATION: ICD-10-CM

## 2021-02-02 DIAGNOSIS — Z00.00 ROUTINE HISTORY AND PHYSICAL EXAMINATION OF ADULT: ICD-10-CM

## 2021-02-02 DIAGNOSIS — E78.5 DYSLIPIDEMIA: ICD-10-CM

## 2021-02-02 DIAGNOSIS — I10 HYPERTENSION GOAL BP (BLOOD PRESSURE) < 140/90: Primary | ICD-10-CM

## 2021-02-02 DIAGNOSIS — R73.03 PREDIABETES: ICD-10-CM

## 2021-02-02 NOTE — PROGRESS NOTES
PLEASE REVIEW, PLACE FUTURE ORDERS OR SIGN PENDED ORDERS FOR PATIENT'S UPCOMING LAB ONLY APPOINTMENT ON 02.05.21.    THANK YOU.   MEGHAN [facility] ALTHEA

## 2021-02-03 NOTE — TELEPHONE ENCOUNTER
There is not a current, normal ACT on file in the last 6 months.  RN unable to refill medication.    Eliana Castillo BSN, RN

## 2021-02-05 RX ORDER — ALBUTEROL SULFATE 90 UG/1
AEROSOL, METERED RESPIRATORY (INHALATION)
Qty: 18 G | Refills: 3 | Status: SHIPPED | OUTPATIENT
Start: 2021-02-05 | End: 2021-10-05

## 2021-02-10 ENCOUNTER — OFFICE VISIT (OUTPATIENT)
Dept: FAMILY MEDICINE | Facility: CLINIC | Age: 56
End: 2021-02-10
Payer: COMMERCIAL

## 2021-02-10 ENCOUNTER — APPOINTMENT (OUTPATIENT)
Dept: URBAN - METROPOLITAN AREA CLINIC 252 | Age: 56
Setting detail: DERMATOLOGY
End: 2021-02-11

## 2021-02-10 VITALS — RESPIRATION RATE: 16 BRPM | WEIGHT: 240 LBS | HEIGHT: 68 IN

## 2021-02-10 VITALS
DIASTOLIC BLOOD PRESSURE: 70 MMHG | BODY MASS INDEX: 37.03 KG/M2 | HEIGHT: 69 IN | HEART RATE: 72 BPM | SYSTOLIC BLOOD PRESSURE: 130 MMHG | TEMPERATURE: 97.1 F | WEIGHT: 250 LBS | OXYGEN SATURATION: 96 %

## 2021-02-10 DIAGNOSIS — R21 RASH AND OTHER NONSPECIFIC SKIN ERUPTION: ICD-10-CM

## 2021-02-10 DIAGNOSIS — L57.0 ACTINIC KERATOSIS: ICD-10-CM

## 2021-02-10 DIAGNOSIS — L81.4 OTHER MELANIN HYPERPIGMENTATION: ICD-10-CM

## 2021-02-10 DIAGNOSIS — I10 HYPERTENSION GOAL BP (BLOOD PRESSURE) < 140/90: ICD-10-CM

## 2021-02-10 DIAGNOSIS — Z00.00 ROUTINE GENERAL MEDICAL EXAMINATION AT A HEALTH CARE FACILITY: Primary | ICD-10-CM

## 2021-02-10 DIAGNOSIS — Z71.89 OTHER SPECIFIED COUNSELING: ICD-10-CM

## 2021-02-10 DIAGNOSIS — J45.30 MILD PERSISTENT ASTHMA WITHOUT COMPLICATION: ICD-10-CM

## 2021-02-10 PROBLEM — E66.01 MORBID OBESITY (H): Status: ACTIVE | Noted: 2021-02-10

## 2021-02-10 PROCEDURE — 99213 OFFICE O/P EST LOW 20 MIN: CPT | Mod: 25

## 2021-02-10 PROCEDURE — 99396 PREV VISIT EST AGE 40-64: CPT | Performed by: FAMILY MEDICINE

## 2021-02-10 PROCEDURE — 17004 DESTROY PREMAL LESIONS 15/>: CPT

## 2021-02-10 PROCEDURE — OTHER PRESCRIPTION: OTHER

## 2021-02-10 PROCEDURE — OTHER ADDITIONAL NOTES: OTHER

## 2021-02-10 PROCEDURE — OTHER LIQUID NITROGEN: OTHER

## 2021-02-10 PROCEDURE — OTHER COUNSELING: OTHER

## 2021-02-10 RX ORDER — FLUOROURACIL 2 G/40G
5% CREAM TOPICAL BID
Qty: 1 | Refills: 1 | Status: ERX | COMMUNITY
Start: 2021-02-10

## 2021-02-10 RX ORDER — AMLODIPINE BESYLATE 5 MG/1
5 TABLET ORAL DAILY
Qty: 90 TABLET | Refills: 3 | Status: SHIPPED | OUTPATIENT
Start: 2021-02-10 | End: 2022-02-09

## 2021-02-10 RX ORDER — ATENOLOL 50 MG/1
50 TABLET ORAL DAILY
Qty: 90 TABLET | Refills: 3 | Status: SHIPPED | OUTPATIENT
Start: 2021-02-10 | End: 2022-02-09

## 2021-02-10 RX ORDER — CHLORTHALIDONE 25 MG/1
25 TABLET ORAL DAILY
Qty: 90 TABLET | Refills: 3 | Status: SHIPPED | OUTPATIENT
Start: 2021-02-10 | End: 2022-02-09

## 2021-02-10 ASSESSMENT — ENCOUNTER SYMPTOMS
FEVER: 0
PALPITATIONS: 0
HEMATOCHEZIA: 0
MYALGIAS: 0
EYE PAIN: 0
DIZZINESS: 0
FREQUENCY: 0
CONSTIPATION: 0
HEARTBURN: 0
ABDOMINAL PAIN: 0
PARESTHESIAS: 0
WEAKNESS: 0
ARTHRALGIAS: 0
NERVOUS/ANXIOUS: 0
COUGH: 0
JOINT SWELLING: 0
HEMATURIA: 0
SHORTNESS OF BREATH: 0
HEADACHES: 0
DYSURIA: 0
SORE THROAT: 0
CHILLS: 0
NAUSEA: 0
DIARRHEA: 0

## 2021-02-10 ASSESSMENT — LOCATION SIMPLE DESCRIPTION DERM
LOCATION SIMPLE: NOSE
LOCATION SIMPLE: LEFT TEMPLE
LOCATION SIMPLE: LEFT FOREHEAD
LOCATION SIMPLE: LEFT FOREARM
LOCATION SIMPLE: LEFT INDEX FINGER
LOCATION SIMPLE: RIGHT GREAT TOE
LOCATION SIMPLE: RIGHT HAND
LOCATION SIMPLE: LEFT FOOT
LOCATION SIMPLE: LEFT GREAT TOE
LOCATION SIMPLE: LEFT HAND
LOCATION SIMPLE: LEFT UPPER BACK
LOCATION SIMPLE: PLANTAR SURFACE OF LEFT 1ST TOE
LOCATION SIMPLE: RIGHT CHEEK
LOCATION SIMPLE: PLANTAR SURFACE OF RIGHT 1ST TOE
LOCATION SIMPLE: RIGHT BREAST
LOCATION SIMPLE: LEFT CHEEK
LOCATION SIMPLE: CHEST
LOCATION SIMPLE: RIGHT FOOT

## 2021-02-10 ASSESSMENT — LOCATION DETAILED DESCRIPTION DERM
LOCATION DETAILED: NASAL ROOT
LOCATION DETAILED: LEFT DORSAL INDEX FINGER METACARPOPHALANGEAL JOINT
LOCATION DETAILED: RIGHT MEDIAL PLANTAR 1ST TOE
LOCATION DETAILED: LEFT INFERIOR TEMPLE
LOCATION DETAILED: LEFT PROXIMAL DORSAL FOREARM
LOCATION DETAILED: LEFT LATERAL SUPERIOR CHEST
LOCATION DETAILED: RIGHT CENTRAL MALAR CHEEK
LOCATION DETAILED: LEFT DORSAL MIDDLE FINGER METACARPOPHALANGEAL JOINT
LOCATION DETAILED: 2ND WEB SPACE RIGHT HAND
LOCATION DETAILED: RIGHT DORSAL GREAT TOE
LOCATION DETAILED: LEFT DORSAL FOOT
LOCATION DETAILED: LEFT RADIAL DORSAL HAND
LOCATION DETAILED: RIGHT DORSAL FOOT
LOCATION DETAILED: LEFT PROXIMAL DORSAL INDEX FINGER
LOCATION DETAILED: LEFT MEDIAL HEEL
LOCATION DETAILED: RIGHT MEDIAL BREAST 2-3:00 REGION
LOCATION DETAILED: RIGHT RADIAL DORSAL HAND
LOCATION DETAILED: LEFT MEDIAL GREAT TOE
LOCATION DETAILED: LEFT LATERAL PLANTAR 1ST TOE
LOCATION DETAILED: LEFT MEDIAL SUPERIOR CHEST
LOCATION DETAILED: 4TH WEB SPACE RIGHT HAND
LOCATION DETAILED: LEFT MEDIAL FOREHEAD
LOCATION DETAILED: RIGHT ULNAR DORSAL HAND
LOCATION DETAILED: RIGHT DORSAL INDEX FINGER METACARPOPHALANGEAL JOINT
LOCATION DETAILED: LEFT SUPERIOR UPPER BACK
LOCATION DETAILED: LEFT MEDIAL DORSAL FOOT
LOCATION DETAILED: LEFT INFERIOR CENTRAL MALAR CHEEK
LOCATION DETAILED: RIGHT MEDIAL BREAST 1-2:00 REGION

## 2021-02-10 ASSESSMENT — LOCATION ZONE DERM
LOCATION ZONE: NOSE
LOCATION ZONE: TRUNK
LOCATION ZONE: FINGER
LOCATION ZONE: ARM
LOCATION ZONE: TOE
LOCATION ZONE: HAND
LOCATION ZONE: FEET
LOCATION ZONE: FACE

## 2021-02-10 ASSESSMENT — MIFFLIN-ST. JEOR: SCORE: 1793.37

## 2021-02-10 NOTE — PROGRESS NOTES
SUBJECTIVE:   CC: Pam Mora is an 55 year old woman who presents for preventive health visit.       Patient has been advised of split billing requirements and indicates understanding: Yes  Healthy Habits:     Getting at least 3 servings of Calcium per day:  Yes    Bi-annual eye exam:  Yes    Dental care twice a year:  NO    Sleep apnea or symptoms of sleep apnea:  None    Diet:  Carbohydrate counting    Frequency of exercise:  2-3 days/week    Duration of exercise:  30-45 minutes    Taking medications regularly:  Yes    Medication side effects:  None    PHQ-2 Total Score: 0    Additional concerns today:  No    Previous atypical chest pain, mitral regurg, palpitations - has had this for many years off and on. It was located on the right upper chest but now on the left upper chest. Described as an ache, this is random and non exertional. Lasting only a few seconds, it does not radiate anywhere. Severity can be up to 5/10. It resolves with time but can get worse with deep breathing. Not associated with nausea, shortness of breath, dizziness, palpitations. She denies anxiety/stress. She has GERD for which she takes Omeprazole. Denies recreational drug use. Not sure about chest wall pain. Denies risk factors for PE like estrogen use, long car/plane ride, FH of blood clots.  Evaluation and treatment:    Stress echo negative 8/31/05   Nuclear stress test 5/21/09 negative   Stress echo negative 10/25/17.   She inquired about coronary calcium score but I convinced her to focus on risk modification and not more testing.   Saw cardiology in Sept 2020 - echo mild MR, Holter rare PAC's and PCV's.     Abdomina bloating, elevated liver enzymes due to fatty liver - this is chronic intermittent. She denies pain but there is discomfort. Fullness after eating small amounts. She sometimes has diarrhea mild. But generally normal BM's 1-2 per day. No nausea or vomiting. No fevers or weight loss.   Evaluation and  treatment:   ultrasound 6/5/14 showing fatty liver and also two spots on the liver.    MRI on 6/11/14 showed those spots to be sparing of fatty liver.    Another ultrasound 11/7/14 to evaluate the chest pain and elevated liver enzymes, similar findings.   Has seen [facility] - I asked her to see them again for any ongoing GI symptoms.     Lab Results   Component Value Date    AST 56 03/21/2019     Lab Results   Component Value Date    ALT 85 03/21/2019     No results found for: BILICONJ   Lab Results   Component Value Date    BILITOTAL 1.1 03/21/2019     Lab Results   Component Value Date    ALBUMIN 4.2 03/21/2019     Lab Results   Component Value Date    PROTTOTAL 8.0 03/21/2019      Lab Results   Component Value Date    ALKPHOS 94 03/21/2019     Thickened endometrium and ovarian cyst -   Evaluation and treatment:   U/S as below on 6/5/14   Endometrial bx as below 7/16/14   Saw GYN on 10/13/14 but only hot flushes discussed    She is planning to set up GYN appointment soon - I asked her to address this at that time.      PELVIC ULTRASOUND COMPLETE WITH TRANSVAGINAL IMAGING 6/5/2014, 9:57 AM  HISTORY: Flatulence, eructation, and gas pain.  COMPARISON: None.  FINDINGS: Transabdominal and transvaginal imaging was performed.  Transvaginal imaging was performed to better visualize the endometrium  and adnexa. Uterus is retroverted and normal in size measuring 9.6 x  6.8 x 5.7 cm. The endometrium measures 1.2 cm in thickness. The right  ovary is normal in size and appearance. There is a cyst in the left  ovary measuring 4.0 x 3.4 x 3.2 cm. Color Doppler and Doppler waveform  analysis of the ovary shows blood flow bilaterally. No torsion. There  is small amount of free fluid in the pelvis.  IMPRESSION  IMPRESSION:  1. The endometrium is abnormally thickened for a postmenopausal woman  measuring 1.2 cm.   2. There is a 4.0 cm simple cyst in the left ovary, abnormal in a  postmenopausal woman. Followup in 3-6 months  recommended.  FELICIA ASENCIO MD    Patient Name: SANDRA SMITH   MR#: [id number]   Specimen #: [id number]   Collected: 7/16/2014   Received: 7/16/2014   Reported: 7/17/2014 06:32   Ordering Phy(s): ENDER POLANCO       SPECIMEN(S):   Endometrial biopsy     FINAL DIAGNOSIS:   Endometrial biopsy:         - Inactive atrophic endometrium with stromal breakdown (see   comment)     COMMENT:   Although there is no evidence of hyperplasia in the planes examined,   there are no good-sized tissue fragments with intact glands and stroma   to adequately assess for hyperplasia.  There is no evidence of atypia or   malignancy.  Should the bleeding persist, especially if the patient has   risk factors for hyperplasia, repeat biopsy may be recommended.     HTN - not checking at home. Controlled in clinic.   Evaluation and treatment:    Atenolol/Chlorthalidone 50/25 mg qd - no side effects.   Lisinopril stopped because it caused cough   Norvasc 5 mg qd - no side effects.   Previously lowish potassium - Takes potassium unspecified dose.   Continue current tx.   Future BMP since not fasting today.    BP Readings from Last 6 Encounters:   02/10/21 130/70   02/19/20 125/77   07/02/19 118/78   04/18/19 120/82   03/21/19 133/82   10/28/18 124/79     Last Comprehensive Metabolic Panel:  Sodium   Date Value Ref Range Status   03/07/2020 141 133 - 144 mmol/L Final     Potassium   Date Value Ref Range Status   03/07/2020 3.5 3.4 - 5.3 mmol/L Final     Chloride   Date Value Ref Range Status   03/07/2020 104 94 - 109 mmol/L Final     Carbon Dioxide   Date Value Ref Range Status   03/07/2020 27 20 - 32 mmol/L Final     Anion Gap   Date Value Ref Range Status   03/07/2020 10 3 - 14 mmol/L Final     Glucose   Date Value Ref Range Status   03/07/2020 113 (H) 70 - 99 mg/dL Final     Comment:     Fasting specimen     Urea Nitrogen   Date Value Ref Range Status   03/07/2020 17 7 - 30 mg/dL Final     Creatinine   Date Value Ref Range Status    03/07/2020 0.68 0.52 - 1.04 mg/dL Final     GFR Estimate   Date Value Ref Range Status   03/07/2020 >90 >60 mL/min/[1.73_m2] Final     Comment:     Non  GFR Calc  Starting 12/18/2018, serum creatinine based estimated GFR (eGFR) will be   calculated using the Chronic Kidney Disease Epidemiology Collaboration   (CKD-EPI) equation.       Calcium   Date Value Ref Range Status   03/07/2020 9.3 8.5 - 10.1 mg/dL Final     Dyslipidemia - No h/o CAD, CVA, PAD or diabetes.  Evaluation and treatment:    Stopped Zocor for no specicif reason but based on ATP4, statin not needed anyway.   Future lipids since not fasting today.     Recent Labs   Lab Test 03/07/20  0924 03/21/19  0842 10/22/14  0850 10/22/14  0850 01/13/14  0851   CHOL 246* 282*   < > 215* 252*   HDL 71 74   < > 73 66   * 183*   < > 112 168*   TRIG 99 126   < > 151* 88   CHOLHDLRATIO  --   --   --  2.9 3.8    < > = values in this interval not displayed.     The 10-year ASCVD risk score (Vandex CRAMER Jr., et al., 2013) is: 3.2%    Values used to calculate the score:      Age: 55 years      Sex: Female      Is Non- : No      Diabetic: No      Tobacco smoker: No      Systolic Blood Pressure: 142 mmHg      Is BP treated: Yes      HDL Cholesterol: 71 mg/dL      Total Cholesterol: 246 mg/dL    Obesity/prediabetes -   Evaluation and treatment:    has seen nutritionist previously.    Has previously  followed with obesity clinic.    Tries to eat healthy.    Diet and exercise discussed.   A1c with future labs.    Lab Results   Component Value Date    A1C 5.5 03/21/2019    A1C 5.6 02/23/2018    A1C 5.3 05/15/2013       TSH   Date Value Ref Range Status   03/07/2020 1.64 0.40 - 4.00 mU/L Final     Wt Readings from Last 5 Encounters:   02/10/21 113.4 kg (250 lb)   07/02/19 109.8 kg (242 lb)   04/18/19 108 kg (238 lb)   10/28/18 107.5 kg (237 lb)   06/06/18 103 kg (227 lb)     Mild persistent asthma - baseline symptoms are wheezing and  shortness of breath rarely. But lately this is happening daily, not at night.  Evaluation and treatment:    Flovent - helps.   Alb prn. Having to use once per day not at night.   Discussed intensifying her treatment vs seeing specialist. She chose the latter - I referred her.    ACT Total Scores 5/7/2020   ACT TOTAL SCORE -   ASTHMA ER VISITS -   ASTHMA HOSPITALIZATIONS -   ACT TOTAL SCORE (Goal Greater than or Equal to 20) 15   In the past 12 months, how many times did you visit the emergency room for your asthma without being admitted to the hospital? 0   In the past 12 months, how many times were you hospitalized overnight because of your asthma? 0     Migraines - has not had a flare since 2013. Previously on Topamax but not any more.      Lung nodule - She denies cough, shortness of breath or other respiratory symptoms. She has previous h/o smoking.   Evaluation and treatment:    Had 3 mm lung nodule in 2007   She wanted repeat - CT 11/5/13 - 4 mm nodule. CT 2/21/20 - 3 nodules 3, 3, 4 mm all stable. No further follow up suggested.    Thyroid calcification - as above on CT noted incidentally. TSH 4/25/13 normal. U/S showed thyroid nodule. She saw endocrine and was told the bx was normal.     TSH   Date Value Ref Range Status   03/07/2020 1.64 0.40 - 4.00 mU/L Final     Skin lumps on abd -     ULTRASOUND ABDOMEN LIMITED July 10, 2015 8:31 AM  HISTORY: Palpable subcutaneous masses in the right abdomen.  FINDINGS: Ultrasound was targeted to the palpable masses in the right  upper quadrant. Each of these correlates with a superficial  circumscribed hyperechoic nodule. No internal vascularity is  demonstrated. The first measures 1.4 x 1.8 x 1.2 cm. The second  measures 1.5 x 1.9 x 0.9 cm.   IMPRESSION  IMPRESSION: Findings suggest that the palpable nodules represent  lipomas.  CECI HOLLY MD    Tobacco abuse - Has 23 pack history of smoking. Quit in 2007. See lung nodule above.    Surgical history -   Right hip  replacement May 2019.     Preventive - Declines Tdap, Prevnar, Shingrix, Pneumovax and flu shot,     There is no immunization history on file for this patient.    Mammogram - 20 - distortion on mammogram - U/S did not show it. Then on 20 - MRI negative. Back to yearly mammograms.    PAP - 18 - repeat in 5 years.     Lab Results   Component Value Date    PAP NIL 2018     Hep C screen: negative 17    Colon cancer screen: patient was scheduled with [facility], but didn't go to the appointment per [facility]. She promised multiple times she would set it up but didn't. This time I ordered cologuard.     SH:    Marital status:   Kids: 3  Employment: with [employer]  Exercise: treadmill 30 min, daily  Tobacco: per HPI  Etoh: 2-3 per week, 2 at a time  Recreational drugs: no  Caffeine: coffee one cup per day    FH:    High cholesterol dad. Dad age 82, CABG.      Today's PHQ-2 Score:   PHQ-2 (  [employer]) 2020   Q1: Little interest or pleasure in doing things 0   Q2: Feeling down, depressed or hopeless 0   PHQ-2 Score 0   Q1: Little interest or pleasure in doing things -   Q2: Feeling down, depressed or hopeless -   PHQ-2 Score -       Abuse: Current or Past (Physical, Sexual or Emotional) - No  Do you feel safe in your environment? Yes    Have you ever done Advance Care Planning? (For example, a Health Directive, POLST, or a discussion with a medical provider or your loved ones about your wishes): Yes, advance care planning is on file.    Social History     Tobacco Use     Smoking status: Former Smoker     Quit date: 2008     Years since quittin.1     Smokeless tobacco: Never Used   Substance Use Topics     Alcohol use: Yes     Comment: 3 drinks a week         Alcohol Use 2017   Prescreen: >3 drinks/day or >7 drinks/week? The patient does not drink >3 drinks per day nor >7 drinks per week.       Any new diagnosis of family breast, ovarian, or bowel cancer? No  "    Reviewed orders with patient.  Reviewed health maintenance and updated orders accordingly - yes      Breast CA Risk Screening:  No flowsheet data found.        Pertinent mammograms are reviewed under the imaging tab.    History of abnormal Pap smear:   PAP / HPV Latest Ref Rng & Units 4/16/2018 11/15/2012 9/23/2011   PAP - NIL NIL NIL   HPV 16 DNA NEG:Negative Negative - -   HPV 18 DNA NEG:Negative Negative - -   OTHER HR HPV NEG:Negative Negative - -     Reviewed and updated as needed this visit by clinical staff  Tobacco  Allergies               Reviewed and updated as needed this visit by Provider                    Review of Systems   Constitutional: Negative for chills and fever.   HENT: Negative for congestion, ear pain, hearing loss and sore throat.    Eyes: Negative for pain and visual disturbance.   Respiratory: Negative for cough and shortness of breath.    Cardiovascular: Negative for chest pain, palpitations and peripheral edema.   Gastrointestinal: Negative for abdominal pain, constipation, diarrhea, heartburn, hematochezia and nausea.   Genitourinary: Negative for dysuria, frequency, genital sores, hematuria, pelvic pain, urgency and vaginal bleeding.   Musculoskeletal: Negative for arthralgias, joint swelling and myalgias.   Skin: Negative for rash.   Neurological: Negative for dizziness, weakness, headaches and paresthesias.   Psychiatric/Behavioral: Negative for mood changes. The patient is not nervous/anxious.        OBJECTIVE:   /70   Pulse 72   Temp 97.1  F (36.2  C) (Tympanic)   Ht 1.753 m (5' 9\")   Wt 113.4 kg (250 lb)   SpO2 96%   BMI 36.92 kg/m    Physical Exam  GENERAL: healthy, alert and no distress  EYES: Eyes grossly normal to inspection, PERRL and conjunctivae and sclerae normal  HENT: ear canals and TM's normal, nose and mouth without ulcers or lesions  NECK: no adenopathy, no asymmetry, masses, or scars and thyroid normal to palpation  RESP: lungs clear to " "auscultation - no rales, rhonchi or wheezes  BREAST: normal without masses, tenderness or nipple discharge and no palpable axillary masses or adenopathy  CV: regular rate and rhythm, normal S1 S2, no S3 or S4, no murmur, click or rub, no peripheral edema and peripheral pulses strong  ABDOMEN: soft, nontender, no hepatosplenomegaly, no masses and bowel sounds normal  MS: no gross musculoskeletal defects noted, no edema  SKIN: no suspicious lesions or rashes  NEURO: Normal strength and tone, mentation intact and speech normal  PSYCH: mentation appears normal, affect normal/bright      ASSESSMENT/PLAN:     Patient has been advised of split billing requirements and indicates understanding: Yes  COUNSELING:  Reviewed preventive health counseling, as reflected in patient instructions       Regular exercise       Healthy diet/nutrition    Estimated body mass index is 36.92 kg/m  as calculated from the following:    Height as of this encounter: 1.753 m (5' 9\").    Weight as of this encounter: 113.4 kg (250 lb).    Weight management plan: Discussed healthy diet and exercise guidelines    She reports that she quit smoking about 13 years ago. She has never used smokeless tobacco.    Assessment and Plan - Decision Making    1. Routine general medical examination at a health care facility    Results of today's exam given to patient verbally along with age appropriate preventive measures. Written instructions reviewed and handed to the patient.    - OCTAVIANO([employer])    2. Hypertension goal BP (blood pressure) < 140/90    Per HPI    - amLODIPine (NORVASC) 5 MG tablet; Take 1 tablet (5 mg) by mouth daily  Dispense: 90 tablet; Refill: 3  - atenolol (TENORMIN) 50 MG tablet; Take 1 tablet (50 mg) by mouth daily  Dispense: 90 tablet; Refill: 3  - chlorthalidone (HYGROTON) 25 MG tablet; Take 1 tablet (25 mg) by mouth daily  Dispense: 90 tablet; Refill: 3    3. Mild persistent asthma without complication    Per HPI    - " ALLERGY/ASTHMA ADULT REFERRAL      Written instructions given as follows:    Patient Instructions   1. I recommend including veggies, fresh fruits (3 to 5 servings), nuts (unsalted) in your daily diet. Cut down on carbs like bread, pasta, rice, potatoes. Cut down on red meats like burgers etc. Increase healthy proteins like beans, tofu, tuna, chicken and turkey.    2. Continue the treadmill 30 min per day.      3. Do self breast exams monthly. Report any lumps.    4. Avoid the sun or otherwise use sun screen.    5. See the dentist and eye doctor regularly.     6. Set up the asthma specialist appointment - [phone] or stop by the .    7. When you see GYN in April, ask about the endometrial thickening.    8. Send in the stool sample as soon as you get it in the mail.    9. I will contact you about your results - if all is well, see you in one year for a physical with fasting labs.

## 2021-02-10 NOTE — HPI: FULL BODY SKIN EXAMINATION
How Severe Are Your Spot(S)?: moderate
What Type Of Note Output Would You Prefer (Optional)?: Bullet Format
What Is The Reason For Today's Visit?: Full Body Skin Examination
What Is The Reason For Today's Visit? (Being Monitored For X): the development of new lesions
Additional History: Hx of Efudex chest, top of hands

## 2021-02-10 NOTE — PROCEDURE: ADDITIONAL NOTES
Detail Level: Simple
Additional Notes: Recommend to Efudex to the top of the feet for 3 weeks after completion of the hands
Render Risk Assessment In Note?: no

## 2021-02-10 NOTE — PATIENT INSTRUCTIONS
1. I recommend including veggies, fresh fruits (3 to 5 servings), nuts (unsalted) in your daily diet. Cut down on carbs like bread, pasta, rice, potatoes. Cut down on red meats like burgers etc. Increase healthy proteins like beans, tofu, tuna, chicken and turkey.    2. Continue the treadmill 30 min per day.      3. Do self breast exams monthly. Report any lumps.    4. Avoid the sun or otherwise use sun screen.    5. See the dentist and eye doctor regularly.     6. Set up the asthma specialist appointment - [phone] or stop by the .    7. When you see GYN in April, ask about the endometrial thickening.    8. Send in the stool sample as soon as you get it in the mail.    9. I will contact you about your results - if all is well, see you in one year for a physical with fasting labs.

## 2021-02-10 NOTE — PROGRESS NOTES
"   SUBJECTIVE:   CC: Pam Mora is an 55 year old woman who presents for preventive health visit.       Patient has been advised of split billing requirements and indicates understanding: {Yes and No:304687}  Healthy Habits:    Do you get at least three servings of calcium containing foods daily (dairy, green leafy vegetables, etc.)? { :149950::\"yes\"}    Amount of exercise or daily activities, outside of work: { :885659}    Problems taking medications regularly { :919675::\"No\"}    Medication side effects: { :308800::\"No\"}    Have you had an eye exam in the past two years? { :413831}    Do you see a dentist twice per year? { :741325}    Do you have sleep apnea, excessive snoring or daytime drowsiness?{ :886919}  {Outside tests to abstract? :548435}    {additional problems to add (Optional):642660}    Today's PHQ-2 Score:   PHQ-2 (  [employer]) 2020   Q1: Little interest or pleasure in doing things 0 0   Q2: Feeling down, depressed or hopeless 0 0   PHQ-2 Score 0 0   Q1: Little interest or pleasure in doing things - -   Q2: Feeling down, depressed or hopeless - -   PHQ-2 Score - -     {PHQ-2 LOOK IN ASSESSMENTS (Optional) :894646}  Abuse: Current or Past(Physical, Sexual or Emotional)- {YES/NO/NA:446258}  Do you feel safe in your environment? {YES/NO/NA:458402}    Have you ever done Advance Care Planning? (For example, a Health Directive, POLST, or a discussion with a medical provider or your loved ones about your wishes): { :029638}    Social History     Tobacco Use     Smoking status: Former Smoker     Quit date: 2008     Years since quittin.1     Smokeless tobacco: Never Used   Substance Use Topics     Alcohol use: Yes     Comment: 3 drinks a week     If you drink alcohol do you typically have >3 drinks per day or >7 drinks per week? {ETOH :602313}                     Reviewed orders with patient.  Reviewed health maintenance and updated orders accordingly - " "{Yes/No:899333::\"Yes\"}  {Chronicprobdata (Optional):438463}    Breast CA Risk Screening:  No flowsheet data found.  {Pull in link for FHS-7 answers if completed after opening note (Optional) :746890}  {If any of the questions to the BCRA (FHS-7) are answered yes, consider ordering referral for genetic counseling (Optional) :997519::\"click delete button to remove this line now\"}  {AMB Mammogram Decision Support (Optional) :211690}  Pertinent mammograms are reviewed under the imaging tab.    Pertinent mammograms are reviewed under the imaging tab.  History of abnormal Pap smear: {PAP HX:576818}  PAP / HPV Latest Ref Rng & Units 4/16/2018 11/15/2012 9/23/2011   PAP - NIL NIL NIL   HPV 16 DNA NEG:Negative Negative - -   HPV 18 DNA NEG:Negative Negative - -   OTHER HR HPV NEG:Negative Negative - -     Reviewed and updated as needed this visit by clinical staff                 Reviewed and updated as needed this visit by Provider                {HISTORY OPTIONS (Optional):639581}    ROS:  { :275185}    OBJECTIVE:   There were no vitals taken for this visit.  EXAM:  {Exam Choices:565993}    {Diagnostic Test Results (Optional):565910::\"Diagnostic Test Results:\",\"Labs reviewed in Epic\"}    ASSESSMENT/PLAN:   {Diag Picklist:708910}    Patient has been advised of split billing requirements and indicates understanding: {YES / NO:035493::\"Yes\"}  COUNSELING:   {FEMALE COUNSELING MESSAGES:399630::\"Reviewed preventive health counseling, as reflected in patient instructions\"}    Estimated body mass index is 37.9 kg/m  as calculated from the following:    Height as of 7/2/19: 1.702 m (5' 7\").    Weight as of 7/2/19: 109.8 kg (242 lb).    {Weight Management Plan (ACO) Complete if BMI is abnormal-  Ages 18-64  BMI >24.9.  Age 65+ with BMI <23 or >30 (Optional):773125}    She reports that she quit smoking about 13 years ago. She has never used smokeless tobacco.      Counseling Resources:  ATP IV Guidelines  Pooled Cohorts Equation " Calculator  Breast Cancer Risk Calculator  BRCA-Related Cancer Risk Assessment: FHS-7 Tool  FRAX Risk Assessment  ICSI Preventive Guidelines  Dietary Guidelines for Americans, 2010  USDA's MyPlate  ASA Prophylaxis  Lung CA Screening    FAVIAN GOLD MD  [facility]

## 2021-02-16 ENCOUNTER — NURSE TRIAGE (OUTPATIENT)
Dept: NURSING | Facility: CLINIC | Age: 56
End: 2021-02-16

## 2021-02-16 NOTE — TELEPHONE ENCOUNTER
RN advice based on CDC guidelines.    If there is any other concern, please set up Video (preferred) or telephone visit please.    May double book.    Franki Eason M.D.

## 2021-02-16 NOTE — TELEPHONE ENCOUNTER
Patient instructed needs phone or video visit to discuss her nausea  Patient states she is not comfortable with a video visit    Phone visit 2/17 with Dr. Franki Reno BSN, RN, CPN

## 2021-02-16 NOTE — TELEPHONE ENCOUNTER
Clinic Action Needed: Yes, requests follow up from care team. Please call Pam at [phone], okay to leave detailed message    FNA Triage Call  Presenting Problem:    Pam reports she tested positive for COVID. She asks for advice on how to care for self while with an active infection.    Timeline:  2/10 - seen for a wellness visit with PCP Yumi SAM  2/11 - COVID symptoms started  2/14 - tested positive for COVID at [facility] lab    Today is Day 6 of symptoms:  - has asthma but breathing is fine. Uses her asthma inhalers PRN which helps a bit  - cough has improved  - mild chest discomfort started yesterday    See health teachings below. Patient verbalized understanding.      Routed to: YEISON CARMONA [[id number]]  To PCP/care team:   - patient has nausea, would PCP be okay to call Rx for medication? [facility]  - notifying care team of exposure since she started to have symptoms on 2/11, seen in clinic on 2/10    Ashli Joyner RN/[place] Nurse Advisor      RN Recommendations/Instructions per [facility] Coronavirus COVID-19 recommendations      Assessment (Inquire about Patient's current symptoms)   Assessment   Current Symptoms at time of phone call: (if no symptoms, document No symptoms] Body aches  Nausea  Low grade fever  Headache  Lack of appetite  Loss of taste and smell  Slight cough  Slight chest discomfort   Symptoms onset (if applicable) 2/11/2021     If at time of call, Patients symptoms hare worsened, the Patient should contact 911 or have someone drive them to Emergency Dept promptly:      If Patient calling 911, inform 911 personal that you have tested positive for the Coronavirus (COVID-19).  Place mask on and await 911 to arrive.    If Emergency Dept, If possible, please have another adult drive you to the Emergency Dept but you need to wear mask when in contact with other people.      How can I protect others?    If you have symptoms: stay home and away from others  (self-isolate) until:    You've had no fever--and no medicine that reduces fever--for 1 full day (24 hours). And       Your other symptoms have gotten better. For example, your cough or breathing has improved. And     At least 10 days have passed since your symptoms started. (If you've been told by a doctor that you have a weak immune system, wait 20 days.)     During this time:    Stay in your own room, including for meals. Use your own bathroom if you can.    Stay away from others in your home. No hugging, kissing or shaking hands. No visitors.     Don't go to work, school or anywhere else.     Clean  high touch  surfaces often (doorknobs, counters, handles, etc.). Use a household cleaning spray or wipes. You'll find a full list on the EPA website at www.epa.gov/pesticide-registration/list-n-disinfectants-use-against-sars-cov-2.     Cover your mouth and nose with a mask, tissue or other face covering to avoid spreading germs.    Wash your hands and face often with soap and water.    Make a list of people you have been in close contact with recently, even if either of you wore a face covering.   ; Start your list from 2 days before you became ill or had a positive test.  ; Include anyone that was within 6 feet of you for a cumulative total of 15 minutes or more in 24 hours. (Example: if you sat next to Blake for 5 minutes in the morning and 10 minutes in the afternoon, then you were in close contact for 15 minutes total that day. Blake would be added to your list.)    A public health worker will call or text you. It is important that you answer. They will ask you questions about possible exposures to COVID-19, such as people you have been in direct contact with and places you have visited.    Tell the people on your list that you have COVID-19; they should stay away from others for 14 days starting from the last time they were in contact with you (unless you are told something different from a public health worker).      Caregivers in these groups are at risk for severe illness due to COVID-19:  o People 65 years and older  o People who live in a nursing home or long-term care facility  o People with chronic disease (lung, heart, cancer, diabetes, kidney, liver, immunologic)  o People who have a weakened immune system, including those who:  - Are in cancer treatment  - Take medicine that weakens the immune system, such as corticosteroids  - Had a bone marrow or organ transplant  - Have an immune deficiency  - Have poorly controlled HIV or AIDS  - Are obese (body mass index of 40 or higher)  - Smoke regularly    Caregivers should wear gloves while washing dishes, handling laundry and cleaning bedrooms and bathrooms.    Wash and dry laundry with special caution. Don't shake dirty laundry, and use the warmest water setting you can.    If you have a weakened immune system, ask your doctor about other actions you should take.    For more tips, go to www.cdc.gov/coronavirus/2019-ncov/downloads/10Things.pdf.    You should not go back to work until you meet the guidelines above for ending your home isolation. You don't need to be retested for COVID-19 before going back to work--studies show that you won't spread the virus if it's been at least 10 days since your symptoms started (or 20 days, if you have a weak immune system).  How can I take care of myself?    1. Get lots of rest. Drink extra fluids (unless a doctor has told you not to).    2. Take Tylenol (acetaminophen) for fever or pain. If you have liver or kidney problems, ask your family doctor if it's okay to take Tylenol.     Take either:     650 mg (two 325 mg pills) every 4 to 6 hours, or     1,000 mg (two 500 mg pills) every 8 hours as needed.     Note: Don't take more than 3,000 mg in one day. Acetaminophen is found in many medicines (both prescribed and over-the-counter medicines). Read all labels to be sure you don't take too much.    For children, check the Tylenol bottle  for the right dose (based on their age or weight).    3. If you have other health problems (like cancer, heart failure, an organ transplant or severe kidney disease): Call your specialty clinic if you don't feel better in the next 2 days.    4. Know when to call 911: Emergency warning signs include:    Trouble breathing or shortness of breath    Pain or pressure in the chest that doesn't go away    Feeling confused like you haven't felt before, or not being able to wake up  Bluish-colored lips or face      Reason for Disposition    Chest pain or pressure    Additional Information    Negative: SEVERE difficulty breathing (e.g., struggling for each breath, speaks in single words)    Negative: Difficult to awaken or acting confused (e.g., disoriented, slurred speech)    Negative: Bluish (or gray) lips or face now    Negative: Shock suspected (e.g., cold/pale/clammy skin, too weak to stand, low BP, rapid pulse)    Negative: Sounds like a life-threatening emergency to the triager    Negative: SEVERE or constant chest pain or pressure (Exception: mild central chest pain, present only when coughing)    Negative: MODERATE difficulty breathing (e.g., speaks in phrases, SOB even at rest, pulse 100-120)    Negative: [1] Headache AND [2] stiff neck (can't touch chin to chest)    Negative: MILD difficulty breathing (e.g., minimal/no SOB at rest, SOB with walking, pulse <100)    Protocols used: CORONAVIRUS (COVID-19) DIAGNOSED OR [id number] 1.3

## 2021-02-17 ENCOUNTER — VIRTUAL VISIT (OUTPATIENT)
Dept: FAMILY MEDICINE | Facility: CLINIC | Age: 56
End: 2021-02-17
Payer: COMMERCIAL

## 2021-02-17 DIAGNOSIS — J45.901 EXACERBATION OF ASTHMA, UNSPECIFIED ASTHMA SEVERITY, UNSPECIFIED WHETHER PERSISTENT: Primary | ICD-10-CM

## 2021-02-17 DIAGNOSIS — U07.1 INFECTION DUE TO 2019 NOVEL CORONAVIRUS: ICD-10-CM

## 2021-02-17 PROCEDURE — 99213 OFFICE O/P EST LOW 20 MIN: CPT | Mod: 95 | Performed by: FAMILY MEDICINE

## 2021-02-17 RX ORDER — PREDNISONE 20 MG/1
20 TABLET ORAL DAILY
Qty: 5 TABLET | Refills: 0 | Status: SHIPPED | OUTPATIENT
Start: 2021-02-17 | End: 2021-02-24

## 2021-02-17 NOTE — PROGRESS NOTES
Pam is a 55 year old who is being evaluated via a billable telephone visit.      What phone number would you like to be contacted at? [phone]  How would you like to obtain your AVS? Sumit Tovarid 19 infection - she saw me on 2/10/21 for preventive visit. She was fine at that time. Then on 2/11/21 she developed fatigue, fevers, nausea, mild cough. Baseline shortness of breath with her asthma. Chest is rattling.   Evaluation and treatment:    She tested positive for covid on 2/14/21 at an outside lab.   She has a smart watch that indicates O2 sat around 96%.   Her main symptom was nausea but better today - she declined Zofran but later called back and I rx'd it.   Prednisone burst rx'd.   She wondered about CXR but we agreed it would not .   Discussed parameters that would warrant ER visit.    Please note: only the issues listed at the bottom under Assessment and Plan are addressed today. The rest of the medical problems are listed for the sake of completeness.      Previous atypical chest pain, mitral regurg, palpitations - has had this for many years off and on. It was located on the right upper chest but now on the left upper chest. Described as an ache, this is random and non exertional. Lasting only a few seconds, it does not radiate anywhere. Severity can be up to 5/10. It resolves with time but can get worse with deep breathing. Not associated with nausea, shortness of breath, dizziness, palpitations. She denies anxiety/stress. She has GERD for which she takes Omeprazole. Denies recreational drug use. Not sure about chest wall pain. Denies risk factors for PE like estrogen use, long car/plane ride, FH of blood clots.  Evaluation and treatment:    Stress echo negative 8/31/05   Nuclear stress test 5/21/09 negative   Stress echo negative 10/25/17.   She inquired about coronary calcium score but I convinced her to focus on risk modification and not more testing.   Saw cardiology in  Sept 2020 - echo mild MR, Holter rare PAC's and PCV's.     Abdomina bloating, elevated liver enzymes due to fatty liver - this is chronic intermittent. She denies pain but there is discomfort. Fullness after eating small amounts. She sometimes has diarrhea mild. But generally normal BM's 1-2 per day. No nausea or vomiting. No fevers or weight loss.   Evaluation and treatment:   ultrasound 6/5/14 showing fatty liver and also two spots on the liver.    MRI on 6/11/14 showed those spots to be sparing of fatty liver.    Another ultrasound 11/7/14 to evaluate the chest pain and elevated liver enzymes, similar findings.   Has seen [facility] - I asked her to see them again for any ongoing GI symptoms.     Lab Results   Component Value Date    AST 56 03/21/2019     Lab Results   Component Value Date    ALT 85 03/21/2019     No results found for: BILICONJ   Lab Results   Component Value Date    BILITOTAL 1.1 03/21/2019     Lab Results   Component Value Date    ALBUMIN 4.2 03/21/2019     Lab Results   Component Value Date    PROTTOTAL 8.0 03/21/2019      Lab Results   Component Value Date    ALKPHOS 94 03/21/2019     Thickened endometrium and ovarian cyst -   Evaluation and treatment:   U/S as below on 6/5/14   Endometrial bx as below 7/16/14   Saw GYN on 10/13/14 but only hot flushes discussed    She is planning to set up GYN appointment soon - I asked her to address this at that time.      PELVIC ULTRASOUND COMPLETE WITH TRANSVAGINAL IMAGING 6/5/2014, 9:57 AM  HISTORY: Flatulence, eructation, and gas pain.  COMPARISON: None.  FINDINGS: Transabdominal and transvaginal imaging was performed.  Transvaginal imaging was performed to better visualize the endometrium  and adnexa. Uterus is retroverted and normal in size measuring 9.6 x  6.8 x 5.7 cm. The endometrium measures 1.2 cm in thickness. The right  ovary is normal in size and appearance. There is a cyst in the left  ovary measuring 4.0 x 3.4 x 3.2 cm. Color Doppler and Doppler  waveform  analysis of the ovary shows blood flow bilaterally. No torsion. There  is small amount of free fluid in the pelvis.  IMPRESSION  IMPRESSION:  1. The endometrium is abnormally thickened for a postmenopausal woman  measuring 1.2 cm.   2. There is a 4.0 cm simple cyst in the left ovary, abnormal in a  postmenopausal woman. Followup in 3-6 months recommended.  FELICIA ASENCIO MD    Patient Name: SANDRA SMITH   MR#: [id number]   Specimen #: [id number]   Collected: 7/16/2014   Received: 7/16/2014   Reported: 7/17/2014 06:32   Ordering Phy(s): ENDER POLANCO       SPECIMEN(S):   Endometrial biopsy     FINAL DIAGNOSIS:   Endometrial biopsy:         - Inactive atrophic endometrium with stromal breakdown (see   comment)     COMMENT:   Although there is no evidence of hyperplasia in the planes examined,   there are no good-sized tissue fragments with intact glands and stroma   to adequately assess for hyperplasia.  There is no evidence of atypia or   malignancy.  Should the bleeding persist, especially if the patient has   risk factors for hyperplasia, repeat biopsy may be recommended.     HTN - not checking at home. Controlled in clinic.   Evaluation and treatment:    Atenolol/Chlorthalidone 50/25 mg qd - no side effects.   Lisinopril stopped because it caused cough   Norvasc 5 mg qd - no side effects.   Previously lowish potassium - Takes potassium unspecified dose.   Continue current tx.   Future BMP since not fasting today.    BP Readings from Last 6 Encounters:   02/10/21 130/70   02/19/20 125/77   07/02/19 118/78   04/18/19 120/82   03/21/19 133/82   10/28/18 124/79     Last Comprehensive Metabolic Panel:  Sodium   Date Value Ref Range Status   03/07/2020 141 133 - 144 mmol/L Final     Potassium   Date Value Ref Range Status   03/07/2020 3.5 3.4 - 5.3 mmol/L Final     Chloride   Date Value Ref Range Status   03/07/2020 104 94 - 109 mmol/L Final     Carbon Dioxide   Date Value Ref Range Status    03/07/2020 27 20 - 32 mmol/L Final     Anion Gap   Date Value Ref Range Status   03/07/2020 10 3 - 14 mmol/L Final     Glucose   Date Value Ref Range Status   03/07/2020 113 (H) 70 - 99 mg/dL Final     Comment:     Fasting specimen     Urea Nitrogen   Date Value Ref Range Status   03/07/2020 17 7 - 30 mg/dL Final     Creatinine   Date Value Ref Range Status   03/07/2020 0.68 0.52 - 1.04 mg/dL Final     GFR Estimate   Date Value Ref Range Status   03/07/2020 >90 >60 mL/min/[1.73_m2] Final     Comment:     Non  GFR Calc  Starting 12/18/2018, serum creatinine based estimated GFR (eGFR) will be   calculated using the Chronic Kidney Disease Epidemiology Collaboration   (CKD-EPI) equation.       Calcium   Date Value Ref Range Status   03/07/2020 9.3 8.5 - 10.1 mg/dL Final     Dyslipidemia - No h/o CAD, CVA, PAD or diabetes.  Evaluation and treatment:    Stopped Zocor for no specicif reason but based on ATP4, statin not needed anyway.   Future lipids since not fasting today.     Recent Labs   Lab Test 03/07/20  0924 03/21/19  0842 10/22/14  0850 10/22/14  0850 01/13/14  0851   CHOL 246* 282*   < > 215* 252*   HDL 71 74   < > 73 66   * 183*   < > 112 168*   TRIG 99 126   < > 151* 88   CHOLHDLRATIO  --   --   --  2.9 3.8    < > = values in this interval not displayed.     The 10-year ASCVD risk score (Van SHOAIB Jr., et al., 2013) is: 2.7%    Values used to calculate the score:      Age: 55 years      Sex: Female      Is Non- : No      Diabetic: No      Tobacco smoker: No      Systolic Blood Pressure: 130 mmHg      Is BP treated: Yes      HDL Cholesterol: 71 mg/dL      Total Cholesterol: 246 mg/dL    Obesity/prediabetes -   Evaluation and treatment:    has seen nutritionist previously.    Has previously  followed with obesity clinic.    Tries to eat healthy.    Diet and exercise discussed.   A1c with future labs.    Lab Results   Component Value Date    A1C 5.5 03/21/2019    A1C  5.6 02/23/2018    A1C 5.3 05/15/2013       TSH   Date Value Ref Range Status   03/07/2020 1.64 0.40 - 4.00 mU/L Final     Wt Readings from Last 5 Encounters:   02/10/21 113.4 kg (250 lb)   07/02/19 109.8 kg (242 lb)   04/18/19 108 kg (238 lb)   10/28/18 107.5 kg (237 lb)   06/06/18 103 kg (227 lb)     Mild persistent asthma - baseline symptoms are wheezing and shortness of breath rarely. But lately this is happening daily, not at night.   Evaluation and treatment:    Flovent - helps.   Alb prn. Having to use once per day not at night.   I previously referred her to specialist - I don't think she went yet.   Now she has covid 19 - see above - will try Prednisone burst.     ACT Total Scores 5/7/2020   ACT TOTAL SCORE -   ASTHMA ER VISITS -   ASTHMA HOSPITALIZATIONS -   ACT TOTAL SCORE (Goal Greater than or Equal to 20) 15   In the past 12 months, how many times did you visit the emergency room for your asthma without being admitted to the hospital? 0   In the past 12 months, how many times were you hospitalized overnight because of your asthma? 0     Migraines - has not had a flare since 2013. Previously on Topamax but not any more.      Lung nodule - She denies cough, shortness of breath or other respiratory symptoms. She has previous h/o smoking.   Evaluation and treatment:    Had 3 mm lung nodule in 2007   She wanted repeat - CT 11/5/13 - 4 mm nodule. CT 2/21/20 - 3 nodules 3, 3, 4 mm all stable. No further follow up suggested.    Thyroid calcification - as above on CT noted incidentally. TSH 4/25/13 normal. U/S showed thyroid nodule. She saw endocrine and was told the bx was normal.     TSH   Date Value Ref Range Status   03/07/2020 1.64 0.40 - 4.00 mU/L Final     Skin lumps on abd -     ULTRASOUND ABDOMEN LIMITED July 10, 2015 8:31 AM  HISTORY: Palpable subcutaneous masses in the right abdomen.  FINDINGS: Ultrasound was targeted to the palpable masses in the right  upper quadrant. Each of these correlates with a  superficial  circumscribed hyperechoic nodule. No internal vascularity is  demonstrated. The first measures 1.4 x 1.8 x 1.2 cm. The second  measures 1.5 x 1.9 x 0.9 cm.   IMPRESSION  IMPRESSION: Findings suggest that the palpable nodules represent  lipomas.  CECI HOLLY MD    Tobacco abuse - Has 23 pack history of smoking. Quit in 2007. See lung nodule above.    Surgical history -   Right hip replacement May 2019.     Preventive - Declines Tdap, Prevnar, Shingrix, Pneumovax and flu shot,     There is no immunization history on file for this patient.    Mammogram - 9/16/20 - distortion on mammogram - U/S did not show it. Then on 9/17/20 - MRI negative. Back to yearly mammograms.    PAP - 4/16/18 - repeat in 5 years.     Lab Results   Component Value Date    PAP NIL 04/16/2018     Hep C screen: negative 1/2/17    Colon cancer screen: patient was scheduled with [facility], but didn't go to the appointment per [facility]. She promised multiple times she would set it up but didn't. This time I ordered cologuard.     SH:    Marital status:   Kids: 3  Employment: with [employer]  Exercise: treadmill 30 min, daily  Tobacco: per HPI  Etoh: 2-3 per week, 2 at a time  Recreational drugs: no  Caffeine: coffee one cup per day    FH:    High cholesterol dad. Dad age 82, CABG.    Exam:    There were no vitals taken for this visit.    Gen: on the phone, sounded healthy in no acute distress    Assessment and Plan - Decision Making    1. Exacerbation of asthma, unspecified asthma severity, unspecified whether persistent    Per HPI    - predniSONE (DELTASONE) 20 MG tablet; Take 1 tablet (20 mg) by mouth daily  Dispense: 5 tablet; Refill: 0    2. Infection due to 2019 novel coronavirus    Per HPI    - predniSONE (DELTASONE) 20 MG tablet; Take 1 tablet (20 mg) by mouth daily  Dispense: 5 tablet; Refill: 0      Written instructions given as follows:    Patient Instructions   1. Take Prednisone as prescribed. This is a  steroid anti-inflammatory medication. When used long term it can have many health consequences. But short term use is safe - common side effects are insomnia and anxiety. It should be taken 1st part of the day.    2. If all is well, see you in one year for a physical with fasting labs.                Total time on the phone and reviewing records: 12 min

## 2021-02-18 ENCOUNTER — NURSE TRIAGE (OUTPATIENT)
Dept: NURSING | Facility: CLINIC | Age: 56
End: 2021-02-18

## 2021-02-18 DIAGNOSIS — R11.0 NAUSEA: Primary | ICD-10-CM

## 2021-02-18 RX ORDER — ONDANSETRON 8 MG/1
8 TABLET, FILM COATED ORAL EVERY 8 HOURS PRN
Qty: 20 TABLET | Refills: 0 | Status: SHIPPED | OUTPATIENT
Start: 2021-02-18 | End: 2021-06-04

## 2021-02-18 NOTE — TELEPHONE ENCOUNTER
Triage Call:    Pt states she talked to Dr. Eason yesterday and discussed antinausea medication.  Pt states she felt like she may not need them yesterday b ut the nausea is back and patient is wondering if she can get the prescription?  Pt states she is not having any other worsening sx just the nausea is back today.     Pharmacy- [facility] on [address] in [place].     Please advise on request.      Georgiana Hummel RN 02/18/21 10:49 AM  [facility] Nurse Advisor    Reason for Disposition    Caller requesting a NON-URGENT new prescription or refill and triager unable to refill per department policy    Additional Information    Negative: Drug overdose and triager unable to answer question    Negative: Caller requesting information unrelated to medicine    Negative: Caller requesting a prescription for Strep throat and has a positive culture result    Negative: Rash while taking a medication or within 3 days of stopping it    Negative: Immunization reaction suspected    Negative: Asthma and having symptoms of asthma (cough, wheezing, etc.)    Negative: Breastfeeding questions about mother's medicines and diet    Negative: MORE THAN A DOUBLE DOSE of a prescription or over-the-counter (OTC) drug    Negative: DOUBLE DOSE (an extra dose or lesser amount) of over-the-counter (OTC) drug and any symptoms (e.g., dizziness, nausea, pain, sleepiness)    Negative: DOUBLE DOSE (an extra dose or lesser amount) of prescription drug and any symptoms (e.g., dizziness, nausea, pain, sleepiness)    Negative: Took another person's prescription drug    Negative: Caller has NON-URGENT medication question about med that PCP prescribed and triager unable to answer question    Negative: Request for URGENT new prescription or refill of 'essential' medication (i.e., likelihood of harm to patient if not taken) and triager unable to fill per department policy    Negative: Prescription not at pharmacy and was prescribed today by PCP     Negative: Pharmacy calling with prescription questions and triager unable to answer question    Negative: Caller has urgent medication question about med that PCP prescribed and triager unable to answer question    Negative: Caller has medication question about med not prescribed by PCP and triager unable to answer question (e.g., compatibility with other med, storage)    Negative: DOUBLE DOSE (an extra dose or lesser amount) of prescription drug and NO symptoms (Exception: a double dose of antibiotics)    Negative: Diabetes drug error or overdose (e.g., took wrong type of insulin or took extra dose)    Protocols used: MEDICATION QUESTION CALL-A-[place]

## 2021-02-18 NOTE — TELEPHONE ENCOUNTER
Left message on pt vm that prescription she requested was sent to the pharmacy.  Loreta Hernandez BSN, RN

## 2021-02-19 NOTE — PATIENT INSTRUCTIONS
1. Take Prednisone as prescribed. This is a steroid anti-inflammatory medication. When used long term it can have many health consequences. But short term use is safe - common side effects are insomnia and anxiety. It should be taken 1st part of the day.    2. If all is well, see you in one year for a physical with fasting labs.

## 2021-02-24 ENCOUNTER — TRANSFERRED RECORDS (OUTPATIENT)
Dept: HEALTH INFORMATION MANAGEMENT | Facility: CLINIC | Age: 56
End: 2021-02-24

## 2021-02-24 ENCOUNTER — TELEPHONE (OUTPATIENT)
Dept: FAMILY MEDICINE | Facility: CLINIC | Age: 56
End: 2021-02-24

## 2021-02-24 ENCOUNTER — VIRTUAL VISIT (OUTPATIENT)
Dept: FAMILY MEDICINE | Facility: CLINIC | Age: 56
End: 2021-02-24
Payer: COMMERCIAL

## 2021-02-24 DIAGNOSIS — J18.9 SECONDARY PNEUMONIA: ICD-10-CM

## 2021-02-24 DIAGNOSIS — J45.30 MILD PERSISTENT ASTHMA WITHOUT COMPLICATION: Primary | ICD-10-CM

## 2021-02-24 PROCEDURE — 99214 OFFICE O/P EST MOD 30 MIN: CPT | Mod: 95 | Performed by: INTERNAL MEDICINE

## 2021-02-24 RX ORDER — PREDNISONE 20 MG/1
20 TABLET ORAL DAILY
Qty: 5 TABLET | Refills: 0 | Status: SHIPPED | OUTPATIENT
Start: 2021-02-24 | End: 2021-06-04

## 2021-02-24 RX ORDER — AZITHROMYCIN 250 MG/1
TABLET, FILM COATED ORAL
Qty: 6 TABLET | Refills: 0 | Status: SHIPPED | OUTPATIENT
Start: 2021-02-24 | End: 2021-03-01

## 2021-02-24 NOTE — TELEPHONE ENCOUNTER
Patient is calling stat in she was dx with COVID on 2/14/2021.  Patient is noticing harder to breathe than it has been when she was on prednisone for 5 days. This was the first day off of prednisone. She is noticing it is harder to breathe and is back at work today. Patient denies severe SOB/trouble breathing/wheezing/bluing of finger,lips, toes,face. Patient is concerned that she may have pneumonia.     Nursing advice:  Due to patient's concerns, 2nd time speaking with a nurse today and her ask to be seen patient is to be seen in clinic.  Patient was assisted in making an appointment with Jennifer Spring CNP as listed below.  She stated that she was busy at that time and would like to be seen at a different time.  I assisted in making an appointment with Dr. Amin in clinic at [place] today (and cancelled her Jennifer appointment) and then received a message (via Teams) from Roxane Moore stating that Dr. Amin doesn't want to see her in clinic and wants to do a telephone visit.  I asked that she switch it over and contact the patient to inform her of this. Patient verbalizes good understanding, agrees with plan and states she needs no further support. Claudia Clemens R.N.         Next 5 appointments (look out 90 days)    Feb 24, 2021  5:20 PM  SHORT with Jennifer Spring CNP  [facility] ([facility] ) [address] [facility] [id number] [phone]

## 2021-02-24 NOTE — PROGRESS NOTES
Pam is a 55 year old who is being evaluated via a billable telephone visit.      What phone number would you like to be contacted at? [phone]  How would you like to obtain your AVS? MyChart  symptoms 11 diagnosed 14 th.  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  [facility] [place]      Assessment & Plan   Problem List Items Addressed This Visit     Mild persistent asthma without complication - Primary    Relevant Medications    azithromycin (ZITHROMAX) 250 MG tablet    predniSONE (DELTASONE) 20 MG tablet    Other Relevant Orders    XR Chest 2 Views      Other Visit Diagnoses     Secondary pneumonia        Relevant Medications    azithromycin (ZITHROMAX) 250 MG tablet    predniSONE (DELTASONE) 20 MG tablet    Other Relevant Orders    XR Chest 2 Views       this seems more like a complication than continuation of covid    [id number]}this seems      Regular exercise    No follow-ups on file.    Brian Amin MD  [facility] [place]    Subjective   Pam is a 55 year old who presents for the following health issues     HPI     Covid positive on 2/14/2021, still having issues with breathing.     New Patient/Transfer of Care    Review of Systems   Constitutional, HEENT, cardiovascular, pulmonary, gi and gu systems are negative, except as otherwise noted.      Objective           Vitals:  No vitals were obtained today due to virtual visit.    Physical Exam   healthy, alert and no distress  PSYCH: Alert and oriented times 3; coherent speech, normal   rate and volume, able to articulate logical thoughts, able   to abstract reason, no tangential thoughts, no hallucinations   or  delusions  Her affect is normal  RESP: No cough, no audible wheezing, able to talk in full sentences  Remainder of exam unable to be completed due to telephone visits    No results found for any visits on 02/24/21.            Phone call duration: 18 minutes

## 2021-02-24 NOTE — TELEPHONE ENCOUNTER
Pt states she just finished the prednisone yesterday.  She has returned to work today.  She is improved from yesterday.  She has some shortness of breath still but is improving.  She is speaking in clear long sentences.  She took her O2 sat and it was 96%.  She is concerned due to hx of asthma.  Advised cough can last for a few more weeks.  It should be improving daily.  Advised if it is not improving daily, worsens or develops new symptoms then we would want her to call and be seen right away.  Ok to continue to use otc meds that match her sx.  Pt has no further questions.  Loreta Hernandez BSN, RN

## 2021-03-01 ENCOUNTER — TELEPHONE (OUTPATIENT)
Dept: FAMILY MEDICINE | Facility: CLINIC | Age: 56
End: 2021-03-01

## 2021-03-01 NOTE — TELEPHONE ENCOUNTER
Left message on answering machine for patient to call back to [phone].    I see the x ray order from Dr. Amin on 2/24/21.  I do not see an x ray result in her chart.      I requested pt call back and clarify where she had x ray done at so we can locate results.    Loreta MARIAN, RN

## 2021-03-01 NOTE — TELEPHONE ENCOUNTER
Left message on answering machine for patient to call back to [phone].    I see the x ray order from Dr. Amin on 2/24/21.  I do not see an x ray result in her chart.      I requested pt call back and clarify where she had x ray done at so we can locate results.    Loreta MARIAN, RN

## 2021-03-01 NOTE — TELEPHONE ENCOUNTER
Reason for Call:  Request for results:    Name of test or procedure: x-ray    Date of test of procedure:     Location of the test or procedure: [place] [facility] to leave the result message on voice mail or with a family member? YES    Phone number Patient can be reached at:  Home number on file [phone] (home)    Additional comments: can send this to my chart    Call taken on 3/1/2021 at 9:46 AM by Mojgan Meraz

## 2021-03-01 NOTE — TELEPHONE ENCOUNTER
[facility]. She had them fax the result to Dr. Amin.    To provider to advise on x ray.  Loreta MARIAN, RN

## 2021-03-03 DIAGNOSIS — R73.03 PREDIABETES: ICD-10-CM

## 2021-03-03 DIAGNOSIS — E78.5 DYSLIPIDEMIA: ICD-10-CM

## 2021-03-03 DIAGNOSIS — Z00.00 ROUTINE HISTORY AND PHYSICAL EXAMINATION OF ADULT: ICD-10-CM

## 2021-03-03 DIAGNOSIS — I10 HYPERTENSION GOAL BP (BLOOD PRESSURE) < 140/90: ICD-10-CM

## 2021-03-03 LAB
ANION GAP SERPL CALCULATED.3IONS-SCNC: 6 MMOL/L (ref 3–14)
BUN SERPL-MCNC: 16 MG/DL (ref 7–30)
CALCIUM SERPL-MCNC: 9.4 MG/DL (ref 8.5–10.1)
CHLORIDE SERPL-SCNC: 102 MMOL/L (ref 94–109)
CHOLEST SERPL-MCNC: 212 MG/DL
CO2 SERPL-SCNC: 30 MMOL/L (ref 20–32)
CREAT SERPL-MCNC: 0.74 MG/DL (ref 0.52–1.04)
GFR SERPL CREATININE-BSD FRML MDRD: >90 ML/MIN/{1.73_M2}
GLUCOSE SERPL-MCNC: 128 MG/DL (ref 70–99)
HBA1C MFR BLD: 6.1 % (ref 0–5.6)
HDLC SERPL-MCNC: 54 MG/DL
LDLC SERPL CALC-MCNC: 127 MG/DL
NONHDLC SERPL-MCNC: 158 MG/DL
POTASSIUM SERPL-SCNC: 3.7 MMOL/L (ref 3.4–5.3)
SODIUM SERPL-SCNC: 138 MMOL/L (ref 133–144)
TRIGL SERPL-MCNC: 157 MG/DL

## 2021-03-03 PROCEDURE — 83036 HEMOGLOBIN GLYCOSYLATED A1C: CPT | Performed by: FAMILY MEDICINE

## 2021-03-03 PROCEDURE — 36415 COLL VENOUS BLD VENIPUNCTURE: CPT | Performed by: FAMILY MEDICINE

## 2021-03-03 PROCEDURE — 80048 BASIC METABOLIC PNL TOTAL CA: CPT | Performed by: FAMILY MEDICINE

## 2021-03-03 PROCEDURE — 80061 LIPID PANEL: CPT | Performed by: FAMILY MEDICINE

## 2021-03-05 NOTE — RESULT ENCOUNTER NOTE
Martha Orozco,    Your cholesterol was a bit high. Your estimated 10 year risk of a heart attack or stroke is 3.3%. Statin drugs are recommended at 7.5% or greater.     Therefore you don't need medications. But please double up your efforts in diet and exercise.    Your glucose is now in the diabetes range and your A1c is in the prediabetes range. The best way to prevent diabetes is with weight loss via diet and exercise.    In 3 months, I would like to see you in clinic with a weight loss challenge of 10-15 pounds. Come in fasting so we can repeat your blood tests.    Also I noticed you had virtual visit on 2/24/21 for asthma. I had referred you to asthma specialist but I don't see upcoming appointment - please set that up.    Regards,    Franki Eason M.D.

## 2021-03-11 ENCOUNTER — OFFICE VISIT (OUTPATIENT)
Dept: ALLERGY | Facility: CLINIC | Age: 56
End: 2021-03-11
Payer: COMMERCIAL

## 2021-03-11 VITALS
BODY MASS INDEX: 37.89 KG/M2 | WEIGHT: 250 LBS | OXYGEN SATURATION: 97 % | HEART RATE: 99 BPM | DIASTOLIC BLOOD PRESSURE: 90 MMHG | SYSTOLIC BLOOD PRESSURE: 137 MMHG | HEIGHT: 68 IN

## 2021-03-11 DIAGNOSIS — J45.50 SEVERE PERSISTENT ASTHMA WITHOUT COMPLICATION (H): Primary | ICD-10-CM

## 2021-03-11 DIAGNOSIS — I10 HYPERTENSION GOAL BP (BLOOD PRESSURE) < 140/90: ICD-10-CM

## 2021-03-11 PROCEDURE — 99244 OFF/OP CNSLTJ NEW/EST MOD 40: CPT | Performed by: ALLERGY & IMMUNOLOGY

## 2021-03-11 RX ORDER — BUDESONIDE AND FORMOTEROL FUMARATE DIHYDRATE 160; 4.5 UG/1; UG/1
2 AEROSOL RESPIRATORY (INHALATION) 2 TIMES DAILY
Qty: 10.2 G | Refills: 3 | Status: SHIPPED | OUTPATIENT
Start: 2021-03-11 | End: 2021-06-22

## 2021-03-11 ASSESSMENT — MIFFLIN-ST. JEOR: SCORE: 1772.49

## 2021-03-11 ASSESSMENT — PAIN SCALES - GENERAL: PAINLEVEL: NO PAIN (0)

## 2021-03-11 NOTE — PATIENT INSTRUCTIONS
Allergy Staff Appt Hours Shot Hours Locations    Physician     Artemio Malhotra DO       Support Staff     JACKIE Stephen CMA  Tuesday:        [place] 7-5 Wednesday:        [place]: 7-5 Thursday:                    [place] 7-6     Friday:  [place]  7-2   [place]        Thursday: 8-5:20        Friday: 7-12     [place]        Tuesday: 7- 3:20 Wednesday: 7-4:20     [place] Monday: 7-2:20 Tuesday: 9-5:20         [facility]  [address] [place] [address]  Appt Line: [phone]  Allergy RN:  [phone]    [facility]  [address] [place] [address]  Appt Line: [phone]  Allergy RN:  [phone]       Important Scheduling Information  Aspirin Desensitization: Appt will last 2 clinic days. Please call the Allergy RN line for your clinic to schedule. Discontinue antihistamines 7 days prior to the appointment.     Food Challenges: Appt will last 3-4 hours. Please call the Allergy RN line for your clinic to schedule. Discontinue antihistamines 7 days prior to the appointment.     Penicillin Testing: Appt will last 2-3 hours. Please call the Allergy RN line for your clinic to schedule. Discontinue antihistamines 7 days prior to the appointment.     Skin Testing: Appt will about 40 minutes. Call the appointment line for your clinic to schedule. Discontinue antihistamines 7 days prior to the appointment.     Venom Testing: Appt will last 2-3 hours. Please call the Allergy RN line for your clinic to schedule. Discontinue antihistamines 7 days prior to the appointment.     Thank you for trusting us with your Allergy, Asthma, and Immunology care. Please feel free to contact us with any questions or concerns you may have.      - Stop Flovent  - Sending for complete pft.   - Symbicort 160/4.5mcg 2 puffs inhaled twice daily with a spacer.   - Albuterol 2-4 puffs inhaled (use a spacer unless using a Proair Respiclick device) every 4 hours as needed for  chest tightness, wheezing, shortness of breath and/or coughing.

## 2021-03-11 NOTE — PROGRESS NOTES
Pam Mora is a 56 year old White female with previous medical history significant for asthma and hypertension. Pam Mora is being seen today for evaluation of asthma. The patient is being seen in consultation at the request of Dr. Yumi MD.     Patient reports that she has had asthma since childhood.  She reports that over the last few months she has had increased asthma symptoms.  She was having asthma symptoms approximately once per day including shortness of breath, wheezing, tightness in chest.  She had Covid in February.  Since that time she has had to use her rescue inhaler 3 times per day.  Albuterol when used is beneficial.  She has been on Flovent 110 mcg 2 puffs inhaled twice daily for months.  Still having asthma symptoms despite being on Flovent.  She has never been on ICS/LABA.  Asthma historically could flare with cold, heat, potentially upper respiratory tract infections.  No hospitalizations or ER visit.  She was placed on prednisone.  Prednisone was helpful.  She additionally was placed on Z-Kevin.  Z-Kevin was helpful.  No recent pulmonary function studies.  Chest x-ray was normal.  Mild allergy symptoms in the late fall but these are minimal.      ENVIRONMENTAL HISTORY: [place] The home is heated with a forced air and gas furnace. They do have central air conditioning. The patient's bedroom is furnished with carpeting in bedroom.  Pets inside the house include 3 dog(s). There is not history of cockroach or mice infestation. There is/are 0 smokers in the house.  The house does not have a damp basement.           ACT Total Scores 3/11/2021   ACT TOTAL SCORE -   ASTHMA ER VISITS -   ASTHMA HOSPITALIZATIONS -   ACT TOTAL SCORE (Goal Greater than or Equal to 20) 15   In the past 12 months, how many times did you visit the emergency room for your asthma without being admitted to the hospital? 0   In the past 12 months, how many times were  you hospitalized overnight because of your asthma? 0         Past Medical History:   Diagnosis Date     AR (allergic rhinitis)      Asthma     Persistant, mild     Elevated fasting glucose      GERD (gastroesophageal reflux disease)      High cholesterol      HTN (hypertension)      Incidental lung nodule 05/07     LSIL (low grade squamous intraepithelial lesion) on Pap smear      Migraine      Obesity (BMI 30-39.9)      Family History   Problem Relation Age of Onset     Neurologic Disorder Maternal Grandfather         parkinsons     Diabetes Paternal Grandfather      Past Surgical History:   Procedure Laterality Date     BIOPSY      THYROID , UTERUS, CERVIX     NO HISTORY OF SURGERY      Dated 10/12/10       REVIEW OF SYSTEMS:  General: negative for weight gain. negative for weight loss. negative for changes in sleep.   Ears: negative for fullness. negative for hearing loss. negative for dizziness.   Nose: negative for snoring.positive  for changes in smell. negative for drainage.   Eyes: negative for eye watering. negative for eye itching. negative for vision changes. negative for eye redness.  Throat: negative for hoarseness. negative for sore throat. negative for trouble swallowing.   Lungs: positive  for shortness of breath.positive  for wheezing. positive  for sputum production.   Cardiovascular: negative for chest pain. negative for swelling of ankles. positive  for fast or irregular heartbeat.   Gastrointestinal: negative for nausea. negative for heartburn. negative for acid reflux.   Musculoskeletal: negative for joint pain. negative for joint stiffness. negative for joint swelling.   Neurologic: negative for seizures. negative for fainting. negative for weakness.   Psychiatric: negative for changes in mood. negative for anxiety.   Endocrine: negative for cold intolerance. negative for heat intolerance. negative for tremors.   Lymphatic: negative for lower extremity swelling. negative for lymph node  swelling.   Hematologic: negative for easy bruising. negative for easy bleeding.  Integumentary: negative for rash. negative for scaling. negative for nail changes.       Current Outpatient Medications:      amLODIPine (NORVASC) 5 MG tablet, Take 1 tablet (5 mg) by mouth daily, Disp: 90 tablet, Rfl: 3     atenolol (TENORMIN) 50 MG tablet, Take 1 tablet (50 mg) by mouth daily, Disp: 90 tablet, Rfl: 3     budesonide-formoterol (SYMBICORT) 160-4.5 MCG/ACT Inhaler, Inhale 2 puffs into the lungs 2 times daily, Disp: 10.2 g, Rfl: 3     chlorthalidone (HYGROTON) 25 MG tablet, Take 1 tablet (25 mg) by mouth daily, Disp: 90 tablet, Rfl: 3     MULTI-VITAMIN OR TABS, 1 TABLET DAILY, Disp: , Rfl:      ondansetron (ZOFRAN) 8 MG tablet, Take 1 tablet (8 mg) by mouth every 8 hours as needed for nausea, Disp: 20 tablet, Rfl: 0     VENTOLIN  (90 Base) MCG/ACT inhaler, INHALE 2 PUFFS BY MOUTH EVERY 4 HOURS AS NEEDED FOR SHORTNESS OF BREATH OR WHEEZING, Disp: 18 g, Rfl: 3     predniSONE (DELTASONE) 20 MG tablet, Take 1 tablet (20 mg) by mouth daily (Patient not taking: Reported on 3/11/2021), Disp: 5 tablet, Rfl: 0    There is no immunization history on file for this patient.  Allergies   Allergen Reactions     Polytrim [Polymyxin B-Trimethoprim]      Sulfa Drugs [Sulfa Drugs] Hives         EXAM:   Constitutional:  Appears well-developed and well-nourished. No distress.   HEENT:   Head: Normocephalic.   Cardiovascular: Normal rate, regular rhythm and normal heart sounds. Exam reveals no gallop and no friction rub.   No murmur heard.  Respiratory: Effort normal and breath sounds normal. No respiratory distress. No wheezes. No rales.   Musculoskeletal: Normal range of motion.   Neuro: Oriented to person, place, and time.  Skin: Skin is warm and dry. No rash noted.   Psychiatric: Normal mood and affect.     Nursing note and vitals reviewed.    ASSESSMENT/PLAN:  Problem List Items Addressed This Visit        Respiratory    Severe  persistent asthma without complication - Primary     Asthma that recently flared with Covid but has been getting worse over the last 1 year.  On Flovent 110 mcg 2 puff inhaled twice daily.  Prednisone recently used and beneficial.    -Stop Flovent.  -Symbicort 160/4.5 mcg 2 puff inhaled twice daily.  -Sending for complete pulmonary function studies.  -Asthma action plan provided and reviewed with the patient.  -Albuterol 2-4 puffs inhaled (use a spacer unless using a Proair Respiclick device) every 4 hours as needed for chest tightness, wheezing, shortness of breath and/or coughing.            Relevant Medications    budesonide-formoterol (SYMBICORT) 160-4.5 MCG/ACT Inhaler    Other Relevant Orders    Pulmonary Function Test       Circulatory    Hypertension goal BP (blood pressure) < 140/90          Chart documentation with Dragon Voice recognition Software. Although reviewed after completion, some words and grammatical errors may remain.    Artemio Malhotra DO FAAAAI  Medical Director for Allergy/Immunology at [place], [place]

## 2021-03-11 NOTE — LETTER
My Asthma Action Plan    Name: Pam Mora   YOB: 1965  Date: 3/11/2021   My doctor: Artemio Malhotra, DO   My clinic: [facility]        My Control Medicine: Budesonide + formoterol (Symbicort HFA) -  160/4.5 mcg 2 puffs twice daily  My Rescue Medicine: Albuterol (Proair/Ventolin/Proventil HFA) 2-4 puffs EVERY 4 HOURS as needed. Use a spacer if recommended by your provider.  My Oral Steroid Medicine: None My Asthma Severity:   Severe Persistent  Know your asthma triggers: upper respiratory infections, humidity and cold air               GREEN ZONE   Good Control    I feel good    No cough or wheeze    Can work, sleep and play without asthma symptoms       Take your asthma control medicine every day.     1. If exercise triggers your asthma, take your rescue medication    15 minutes before exercise or sports, and    During exercise if you have asthma symptoms  2. Spacer to use with inhaler: If you have a spacer, make sure to use it with your inhaler             YELLOW ZONE Getting Worse  I have ANY of these:    I do not feel good    Cough or wheeze    Chest feels tight    Wake up at night   1. Keep taking your Green Zone medications  2. Start taking your rescue medicine:    every 20 minutes for up to 1 hour. Then every 4 hours for 24-48 hours.  3. If you stay in the Yellow Zone for more than 12-24 hours, contact your doctor.  4. If you do not return to the Green Zone in 12-24 hours or you get worse, start taking your oral steroid medicine if prescribed by your provider.           RED ZONE Medical Alert - Get Help  I have ANY of these:    I feel awful    Medicine is not helping    Breathing getting harder    Trouble walking or talking    Nose opens wide to breathe       1. Take your rescue medicine NOW  2. If your provider has prescribed an oral steroid medicine, start taking it NOW  3. Call your doctor NOW  4. If you are still in the Red Zone after 20 minutes and you have  not reached your doctor:    Take your rescue medicine again and    Call 911 or go to the emergency room right away    See your regular doctor within 2 weeks of an Emergency Room or Urgent Care visit for follow-up treatment.          Annual Reminders:  Meet with Asthma Educator,  Flu Shot in the Fall, consider Pneumonia Vaccination for patients with asthma (aged 19 and older).    Pharmacy:    [facility] [place] - [address], [address]  [id number] - [place], [place] - [address] AT [facility] [address]    Electronically signed by Artemio Malhotra, DO   Date: 03/11/21                      Asthma Triggers  How To Control Things That Make Your Asthma Worse    Triggers are things that make your asthma worse.  Look at the list below to help you find your triggers and what you can do about them.  You can help prevent asthma flare-ups by staying away from your triggers.      Trigger                                                          What you can do   Cigarette Smoke  Tobacco smoke can make asthma worse. Do not allow smoking in your home, car or around you.  Be sure no one smokes at a child s day care or school.  If you smoke, ask your health care provider for ways to help you quit.  Ask family members to quit too.  Ask your health care provider for a referral to Quit Plan to help you quit smoking, or call 3-984-505-PLAN.     Colds, Flu, Bronchitis  These are common triggers of asthma. Wash your hands often.  Don t touch your eyes, nose or mouth.  Get a flu shot every year.     Dust Mites  These are tiny bugs that live in cloth or carpet. They are too small to see. Wash sheets and blankets in hot water every week.   Encase pillows and mattress in dust mite proof covers.  Avoid having carpet if you can. If you have carpet, vacuum weekly.   Use a dust mask and HEPA vacuum.   Pollen and Outdoor Mold  Some people are allergic to  trees, grass, or weed pollen, or molds. Try to keep your windows closed.  Limit time out doors when pollen count is high.   Ask you health care provider about taking medicine during allergy season.     Animal Dander  Some people are allergic to skin flakes, urine or saliva from pets with fur or feathers. Keep pets with fur or feathers out of your home.    If you can t keep the pet outdoors, then keep the pet out of your bedroom.  Keep the bedroom door closed.  Keep pets off cloth furniture and away from stuffed toys.     Mice, Rats, and Cockroaches   Some people are allergic to the waste from these pests.   Cover food and garbage.  Clean up spills and food crumbs.  Store grease in the refrigerator.   Keep food out of the bedroom.   Indoor Mold  This can be a trigger if your home has high moisture. Fix leaking faucets, pipes, or other sources of water.   Clean moldy surfaces.  Dehumidify basement if it is damp and smelly.   Smoke, Strong Odors, and Sprays  These can reduce air quality. Stay away from strong odors and sprays, such as perfume, powder, hair spray, paints, smoke incense, paint, cleaning products, candles and new carpet.   Exercise or Sports  Some people with asthma have this trigger. Be active!  Ask your doctor about taking medicine before sports or exercise to prevent symptoms.    Warm up for 5-10 minutes before and after sports or exercise.     Other Triggers of Asthma  Cold air:  Cover your nose and mouth with a scarf.  Sometimes laughing or crying can be a trigger.  Some medicines and food can trigger asthma.

## 2021-03-11 NOTE — ASSESSMENT & PLAN NOTE
Asthma that recently flared with Covid but has been getting worse over the last 1 year.  On Flovent 110 mcg 2 puff inhaled twice daily.  Prednisone recently used and beneficial.    -Stop Flovent.  -Symbicort 160/4.5 mcg 2 puff inhaled twice daily.  -Sending for complete pulmonary function studies.  -Asthma action plan provided and reviewed with the patient.  -Albuterol 2-4 puffs inhaled (use a spacer unless using a Proair Respiclick device) every 4 hours as needed for chest tightness, wheezing, shortness of breath and/or coughing.

## 2021-03-11 NOTE — LETTER
3/11/2021         RE: Pam Mora  [id number] [facility] [address]        Dear Colleague,    Thank you for referring your patient, Pam Mora, to the [facility]. Please see a copy of my visit note below.    Pam Mora is a 56 year old White female with previous medical history significant for asthma and hypertension. Pam Mora is being seen today for evaluation of asthma. The patient is being seen in consultation at the request of Dr. Yumi MD.     Patient reports that she has had asthma since childhood.  She reports that over the last few months she has had increased asthma symptoms.  She was having asthma symptoms approximately once per day including shortness of breath, wheezing, tightness in chest.  She had Covid in February.  Since that time she has had to use her rescue inhaler 3 times per day.  Albuterol when used is beneficial.  She has been on Flovent 110 mcg 2 puffs inhaled twice daily for months.  Still having asthma symptoms despite being on Flovent.  She has never been on ICS/LABA.  Asthma historically could flare with cold, heat, potentially upper respiratory tract infections.  No hospitalizations or ER visit.  She was placed on prednisone.  Prednisone was helpful.  She additionally was placed on Z-Kevin.  Z-Kevin was helpful.  No recent pulmonary function studies.  Chest x-ray was normal.  Mild allergy symptoms in the late fall but these are minimal.      ENVIRONMENTAL HISTORY: [place] The home is heated with a forced air and gas furnace. They do have central air conditioning. The patient's bedroom is furnished with carpeting in bedroom.  Pets inside the house include 3 dog(s). There is not history of cockroach or mice infestation. There is/are 0 smokers in the house.  The house does not have a damp basement.           ACT Total Scores 3/11/2021   ACT TOTAL SCORE -   ASTHMA ER VISITS  -   ASTHMA HOSPITALIZATIONS -   ACT TOTAL SCORE (Goal Greater than or Equal to 20) 15   In the past 12 months, how many times did you visit the emergency room for your asthma without being admitted to the hospital? 0   In the past 12 months, how many times were you hospitalized overnight because of your asthma? 0         Past Medical History:   Diagnosis Date     AR (allergic rhinitis)      Asthma     Persistant, mild     Elevated fasting glucose      GERD (gastroesophageal reflux disease)      High cholesterol      HTN (hypertension)      Incidental lung nodule 05/07     LSIL (low grade squamous intraepithelial lesion) on Pap smear      Migraine      Obesity (BMI 30-39.9)      Family History   Problem Relation Age of Onset     Neurologic Disorder Maternal Grandfather         parkinsons     Diabetes Paternal Grandfather      Past Surgical History:   Procedure Laterality Date     BIOPSY      THYROID , UTERUS, CERVIX     NO HISTORY OF SURGERY      Dated 10/12/10       REVIEW OF SYSTEMS:  General: negative for weight gain. negative for weight loss. negative for changes in sleep.   Ears: negative for fullness. negative for hearing loss. negative for dizziness.   Nose: negative for snoring.positive  for changes in smell. negative for drainage.   Eyes: negative for eye watering. negative for eye itching. negative for vision changes. negative for eye redness.  Throat: negative for hoarseness. negative for sore throat. negative for trouble swallowing.   Lungs: positive  for shortness of breath.positive  for wheezing. positive  for sputum production.   Cardiovascular: negative for chest pain. negative for swelling of ankles. positive  for fast or irregular heartbeat.   Gastrointestinal: negative for nausea. negative for heartburn. negative for acid reflux.   Musculoskeletal: negative for joint pain. negative for joint stiffness. negative for joint swelling.   Neurologic: negative for seizures. negative for fainting. negative  for weakness.   Psychiatric: negative for changes in mood. negative for anxiety.   Endocrine: negative for cold intolerance. negative for heat intolerance. negative for tremors.   Lymphatic: negative for lower extremity swelling. negative for lymph node swelling.   Hematologic: negative for easy bruising. negative for easy bleeding.  Integumentary: negative for rash. negative for scaling. negative for nail changes.       Current Outpatient Medications:      amLODIPine (NORVASC) 5 MG tablet, Take 1 tablet (5 mg) by mouth daily, Disp: 90 tablet, Rfl: 3     atenolol (TENORMIN) 50 MG tablet, Take 1 tablet (50 mg) by mouth daily, Disp: 90 tablet, Rfl: 3     budesonide-formoterol (SYMBICORT) 160-4.5 MCG/ACT Inhaler, Inhale 2 puffs into the lungs 2 times daily, Disp: 10.2 g, Rfl: 3     chlorthalidone (HYGROTON) 25 MG tablet, Take 1 tablet (25 mg) by mouth daily, Disp: 90 tablet, Rfl: 3     MULTI-VITAMIN OR TABS, 1 TABLET DAILY, Disp: , Rfl:      ondansetron (ZOFRAN) 8 MG tablet, Take 1 tablet (8 mg) by mouth every 8 hours as needed for nausea, Disp: 20 tablet, Rfl: 0     VENTOLIN  (90 Base) MCG/ACT inhaler, INHALE 2 PUFFS BY MOUTH EVERY 4 HOURS AS NEEDED FOR SHORTNESS OF BREATH OR WHEEZING, Disp: 18 g, Rfl: 3     predniSONE (DELTASONE) 20 MG tablet, Take 1 tablet (20 mg) by mouth daily (Patient not taking: Reported on 3/11/2021), Disp: 5 tablet, Rfl: 0    There is no immunization history on file for this patient.  Allergies   Allergen Reactions     Polytrim [Polymyxin B-Trimethoprim]      Sulfa Drugs [Sulfa Drugs] Hives         EXAM:   Constitutional:  Appears well-developed and well-nourished. No distress.   HEENT:   Head: Normocephalic.   Cardiovascular: Normal rate, regular rhythm and normal heart sounds. Exam reveals no gallop and no friction rub.   No murmur heard.  Respiratory: Effort normal and breath sounds normal. No respiratory distress. No wheezes. No rales.   Musculoskeletal: Normal range of motion.    Neuro: Oriented to person, place, and time.  Skin: Skin is warm and dry. No rash noted.   Psychiatric: Normal mood and affect.     Nursing note and vitals reviewed.    ASSESSMENT/PLAN:  Problem List Items Addressed This Visit        Respiratory    Severe persistent asthma without complication - Primary     Asthma that recently flared with Covid but has been getting worse over the last 1 year.  On Flovent 110 mcg 2 puff inhaled twice daily.  Prednisone recently used and beneficial.    -Stop Flovent.  -Symbicort 160/4.5 mcg 2 puff inhaled twice daily.  -Sending for complete pulmonary function studies.  -Asthma action plan provided and reviewed with the patient.  -Albuterol 2-4 puffs inhaled (use a spacer unless using a Proair Respiclick device) every 4 hours as needed for chest tightness, wheezing, shortness of breath and/or coughing.            Relevant Medications    budesonide-formoterol (SYMBICORT) 160-4.5 MCG/ACT Inhaler    Other Relevant Orders    Pulmonary Function Test       Circulatory    Hypertension goal BP (blood pressure) < 140/90          Chart documentation with Dragon Voice recognition Software. Although reviewed after completion, some words and grammatical errors may remain.    Artemio Malhotra DO FAAAAI  Medical Director for Allergy/Immunology at [place], [place]        Again, thank you for allowing me to participate in the care of your patient.        Sincerely,        Artemio Malhotra DO

## 2021-03-12 ASSESSMENT — ASTHMA QUESTIONNAIRES: ACT_TOTALSCORE: 15

## 2021-04-15 RX ORDER — DEXAMETHASONE 4 MG/1
TABLET ORAL
Qty: 12 G | OUTPATIENT
Start: 2021-04-15

## 2021-04-15 NOTE — TELEPHONE ENCOUNTER
"flovent inhaler refill request  Last OV: 2/10/21 Dr. Eason.  Had consult with Dr. Malhotra 3/11/21.  Per that note \"Stop Flovent\"   Prescription denied. No longer on it.   "

## 2021-06-03 NOTE — PROGRESS NOTES
Assessment & Plan     1. Edema of left lower extremity  - she is flying to [place] tomorrow- recommend bilateral knee high compression stockings.  Apply in the morning when waking up and take off at bedtime.   - If labs and ultrasound normal, discussed changing CCB to Ace/ARB vs referral to Vascular surgeon for further work-up.   - US Lower Extremity Venous Duplex Left; Future  - Basic metabolic panel  (Ca, Cl, CO2, Creat, Gluc, K, Na, BUN)  - Magnesium    2. Dizziness  - Basic metabolic panel  (Ca, Cl, CO2, Creat, Gluc, K, Na, BUN)  - Magnesium    3. Impacted cerumen of left ear  - WA REMOVAL IMPACTED CERUMEN IRRIGATION/LVG UNILAT      Return in about 2 weeks (around 6/18/2021) for worsening symptoms or failure to improve.    SONIA [facility]omes [facility]   Pam is a 56 year old who presents for the following health issues     HPI     Musculoskeletal problem/pain  Onset/Duration: few months  Description  Location: foot, ankle and calf - left  Joint Swelling: YES  Redness: no  Pain: no  Warmth: no  Intensity:  severe  Progression of Symptoms:  worsening  Accompanying signs and symptoms:   Fevers: no  Numbness/tingling/weakness: no  History  Trauma to the area: no  Recent illness:  no  Previous similar problem: no  Previous evaluation:  no  Precipitating or alleviating factors:  Aggravating factors include: resting and elevation makes better, gets worse as day goes on with use  Therapies tried and outcome: rest/inactivity        Swelling in left ankle began a few months. Swelling get worse throughout the day.  Mild swelling in right LE throughout day. Denies any injury. No recent travel or immobility. No prior surgery on left leg. No rash, cut, or infection of left leg. Denies numbness/tinging or pain. Reports it feels tight. No history of blood clots. Reports good range of motion. No pain with ambulation.  No recent change in medication.  She is flying out for [place]  tomorrow.      Reports dizziness that presented a few months ago.  Was initially occasionally with going from sitting to standing.  No change in vision. For the past week, symptoms are worse and more frequent.  Has room spinning sensation when going from sitting to standing.  Never dizzy when first getting out of bed.  Has h/o palpitations and states this has been worked up, not any more frequent than her normal.  Taking daily potassium, magnesium, zinc, and collagen.  Drinking lots of water.         Review of Systems   Constitutional: Negative for chills, fatigue and fever.   Respiratory: Negative for cough and shortness of breath.    Cardiovascular: Positive for peripheral edema. Negative for chest pain.   Skin: Negative for rash and wound.   Neurological: Positive for dizziness. Negative for weakness, numbness and headaches.            Objective    /82   Pulse 84   Temp 98.2  F (36.8  C) (Oral)   Wt 109.9 kg (242 lb 3.2 oz)   SpO2 96%   BMI 36.83 kg/m    Body mass index is 36.83 kg/m .  Physical Exam  Vitals signs reviewed.   Constitutional:       Appearance: She is well-developed.   HENT:      Head: Normocephalic.      Right Ear: Tympanic membrane normal.      Left Ear: Tympanic membrane normal. There is impacted cerumen.      Ears:      Comments: Cerumen noted in left canal. Does not completely obstruct canal.  Canal irrigated and cleared of cerumen. Pt tolerated well.   Eyes:      Conjunctiva/sclera: Conjunctivae normal.      Pupils: Pupils are equal, round, and reactive to light.   Cardiovascular:      Rate and Rhythm: Normal rate and regular rhythm.      Comments: Trace edema lower extremities, L>R.   Pulmonary:      Effort: Pulmonary effort is normal.      Breath sounds: Normal breath sounds.   Skin:     General: Skin is warm and dry.   Neurological:      Mental Status: She is alert and oriented to person, place, and time.   Psychiatric:         Mood and Affect: Mood normal.         Behavior:  Behavior normal.

## 2021-06-04 ENCOUNTER — OFFICE VISIT (OUTPATIENT)
Dept: FAMILY MEDICINE | Facility: CLINIC | Age: 56
End: 2021-06-04
Payer: COMMERCIAL

## 2021-06-04 ENCOUNTER — TRANSFERRED RECORDS (OUTPATIENT)
Dept: HEALTH INFORMATION MANAGEMENT | Facility: CLINIC | Age: 56
End: 2021-06-04

## 2021-06-04 VITALS
OXYGEN SATURATION: 96 % | TEMPERATURE: 98.2 F | DIASTOLIC BLOOD PRESSURE: 82 MMHG | WEIGHT: 242.2 LBS | BODY MASS INDEX: 36.83 KG/M2 | HEART RATE: 84 BPM | SYSTOLIC BLOOD PRESSURE: 136 MMHG

## 2021-06-04 DIAGNOSIS — H61.22 IMPACTED CERUMEN OF LEFT EAR: ICD-10-CM

## 2021-06-04 DIAGNOSIS — R42 DIZZINESS: ICD-10-CM

## 2021-06-04 DIAGNOSIS — R60.0 EDEMA OF LEFT LOWER EXTREMITY: Primary | ICD-10-CM

## 2021-06-04 LAB
ANION GAP SERPL CALCULATED.3IONS-SCNC: 5 MMOL/L (ref 3–14)
BUN SERPL-MCNC: 15 MG/DL (ref 7–30)
CALCIUM SERPL-MCNC: 9.5 MG/DL (ref 8.5–10.1)
CHLORIDE SERPL-SCNC: 102 MMOL/L (ref 94–109)
CO2 SERPL-SCNC: 32 MMOL/L (ref 20–32)
CREAT SERPL-MCNC: 0.74 MG/DL (ref 0.52–1.04)
GFR SERPL CREATININE-BSD FRML MDRD: >90 ML/MIN/{1.73_M2}
GLUCOSE SERPL-MCNC: 119 MG/DL (ref 70–99)
MAGNESIUM SERPL-MCNC: 1.8 MG/DL (ref 1.6–2.3)
POTASSIUM SERPL-SCNC: 3.8 MMOL/L (ref 3.4–5.3)
SODIUM SERPL-SCNC: 139 MMOL/L (ref 133–144)

## 2021-06-04 PROCEDURE — 99214 OFFICE O/P EST MOD 30 MIN: CPT | Mod: 25 | Performed by: NURSE PRACTITIONER

## 2021-06-04 PROCEDURE — 69209 REMOVE IMPACTED EAR WAX UNI: CPT | Mod: LT | Performed by: NURSE PRACTITIONER

## 2021-06-04 PROCEDURE — 83735 ASSAY OF MAGNESIUM: CPT | Performed by: NURSE PRACTITIONER

## 2021-06-04 PROCEDURE — 36415 COLL VENOUS BLD VENIPUNCTURE: CPT | Performed by: NURSE PRACTITIONER

## 2021-06-04 PROCEDURE — 80048 BASIC METABOLIC PNL TOTAL CA: CPT | Performed by: NURSE PRACTITIONER

## 2021-06-04 ASSESSMENT — ENCOUNTER SYMPTOMS
CHILLS: 0
DIZZINESS: 1
WEAKNESS: 0
HEADACHES: 0
FATIGUE: 0
COUGH: 0
SHORTNESS OF BREATH: 0
WOUND: 0
NUMBNESS: 0
FEVER: 0

## 2021-06-22 ENCOUNTER — TRANSFERRED RECORDS (OUTPATIENT)
Dept: HEALTH INFORMATION MANAGEMENT | Facility: CLINIC | Age: 56
End: 2021-06-22

## 2021-06-22 DIAGNOSIS — J45.50 SEVERE PERSISTENT ASTHMA WITHOUT COMPLICATION (H): ICD-10-CM

## 2021-06-22 RX ORDER — BUDESONIDE AND FORMOTEROL FUMARATE DIHYDRATE 160; 4.5 UG/1; UG/1
2 AEROSOL RESPIRATORY (INHALATION) 2 TIMES DAILY
Qty: 10.2 G | Refills: 3 | Status: SHIPPED | OUTPATIENT
Start: 2021-06-22 | End: 2021-10-05

## 2021-09-04 ENCOUNTER — HEALTH MAINTENANCE LETTER (OUTPATIENT)
Age: 56
End: 2021-09-04

## 2021-09-08 ENCOUNTER — APPOINTMENT (OUTPATIENT)
Dept: URBAN - METROPOLITAN AREA CLINIC 252 | Age: 56
Setting detail: DERMATOLOGY
End: 2021-09-12

## 2021-09-08 ENCOUNTER — TRANSFERRED RECORDS (OUTPATIENT)
Dept: HEALTH INFORMATION MANAGEMENT | Facility: CLINIC | Age: 56
End: 2021-09-08

## 2021-09-08 VITALS — RESPIRATION RATE: 16 BRPM | HEIGHT: 68 IN | WEIGHT: 240 LBS

## 2021-09-08 DIAGNOSIS — D485 NEOPLASM OF UNCERTAIN BEHAVIOR OF SKIN: ICD-10-CM

## 2021-09-08 DIAGNOSIS — L57.0 ACTINIC KERATOSIS: ICD-10-CM

## 2021-09-08 PROBLEM — D48.5 NEOPLASM OF UNCERTAIN BEHAVIOR OF SKIN: Status: ACTIVE | Noted: 2021-09-08

## 2021-09-08 PROCEDURE — OTHER COUNSELING: OTHER

## 2021-09-08 PROCEDURE — 11102 TANGNTL BX SKIN SINGLE LES: CPT | Mod: 59

## 2021-09-08 PROCEDURE — 11103 TANGNTL BX SKIN EA SEP/ADDL: CPT

## 2021-09-08 PROCEDURE — OTHER PATHOLOGY BILLING: OTHER

## 2021-09-08 PROCEDURE — 88305 TISSUE EXAM BY PATHOLOGIST: CPT

## 2021-09-08 PROCEDURE — 17004 DESTROY PREMAL LESIONS 15/>: CPT

## 2021-09-08 PROCEDURE — OTHER PRESCRIPTION: OTHER

## 2021-09-08 PROCEDURE — OTHER BIOPSY BY SHAVE METHOD: OTHER

## 2021-09-08 PROCEDURE — OTHER LIQUID NITROGEN: OTHER

## 2021-09-08 RX ORDER — FLUOROURACIL 2 G/40G
5% CREAM TOPICAL QD
Qty: 40 | Refills: 1 | Status: ERX | COMMUNITY
Start: 2021-09-08

## 2021-09-08 ASSESSMENT — LOCATION DETAILED DESCRIPTION DERM
LOCATION DETAILED: RIGHT LATERAL DISTAL PRETIBIAL REGION
LOCATION DETAILED: LEFT MEDIAL SUPERIOR CHEST
LOCATION DETAILED: RIGHT LATERAL SUPERIOR CHEST
LOCATION DETAILED: LEFT ULNAR DORSAL HAND
LOCATION DETAILED: RIGHT MEDIAL HEEL
LOCATION DETAILED: LEFT PLANTAR FOREFOOT OVERLYING 1ST METATARSAL
LOCATION DETAILED: RIGHT MEDIAL DORSAL FOOT
LOCATION DETAILED: LEFT RADIAL DORSAL HAND
LOCATION DETAILED: LEFT PROXIMAL ULNAR DORSAL SMALL FINGER
LOCATION DETAILED: LEFT LATERAL SUPERIOR CHEST
LOCATION DETAILED: RIGHT MEDIAL BREAST 1-2:00 REGION
LOCATION DETAILED: RIGHT RADIAL DORSAL HAND
LOCATION DETAILED: LEFT HEEL
LOCATION DETAILED: RIGHT DORSAL SMALL FINGER METACARPOPHALANGEAL JOINT
LOCATION DETAILED: LEFT MID DORSAL MIDDLE FINGER
LOCATION DETAILED: RIGHT MEDIAL BREAST 2-3:00 REGION
LOCATION DETAILED: LEFT MEDIAL DORSAL FOOT

## 2021-09-08 ASSESSMENT — LOCATION SIMPLE DESCRIPTION DERM
LOCATION SIMPLE: RIGHT BREAST
LOCATION SIMPLE: LEFT MIDDLE FINGER
LOCATION SIMPLE: LEFT SMALL FINGER
LOCATION SIMPLE: CHEST
LOCATION SIMPLE: LEFT HAND
LOCATION SIMPLE: RIGHT FOOT
LOCATION SIMPLE: LEFT FOOT
LOCATION SIMPLE: LEFT PLANTAR SURFACE
LOCATION SIMPLE: RIGHT PRETIBIAL REGION
LOCATION SIMPLE: RIGHT HAND
LOCATION SIMPLE: LEFT HEEL

## 2021-09-08 ASSESSMENT — LOCATION ZONE DERM
LOCATION ZONE: HAND
LOCATION ZONE: LEG
LOCATION ZONE: FEET
LOCATION ZONE: TRUNK
LOCATION ZONE: FINGER

## 2021-09-08 NOTE — PROCEDURE: PATHOLOGY BILLING
Immunohistochemistry ([id number] and [id number]) billing is not performed here. Please use the Immunohistochemistry Stain Billing plan to accomplish this. Immunohistochemistry ([id number] and [id number]) billing is not performed here. Please use the Immunohistochemistry Stain Billing plan to accomplish this.

## 2021-09-08 NOTE — HPI: SKIN LESION (ACTINIC KERATOSES)
How Severe Are They?: moderate
Is This A New Presentation, Or A Follow-Up?: History of Actinic Keratoses
Additional History: Patient completed the Efudex, patient passed away 2021, age 85 pancreatic cancer

## 2021-09-10 ENCOUNTER — TRANSFERRED RECORDS (OUTPATIENT)
Dept: HEALTH INFORMATION MANAGEMENT | Facility: CLINIC | Age: 56
End: 2021-09-10

## 2021-09-13 ENCOUNTER — TRANSFERRED RECORDS (OUTPATIENT)
Dept: HEALTH INFORMATION MANAGEMENT | Facility: CLINIC | Age: 56
End: 2021-09-13

## 2021-09-28 NOTE — PROGRESS NOTES
Assessment & Plan     Morbid obesity (H)  Work on lifestyle    Hypertension goal BP (blood pressure) < 140/90  stable  - Comprehensive metabolic panel (BMP + Alb, Alk Phos, ALT, AST, Total. Bili, TP); Future    Severe persistent asthma without complication  improved  - budesonide-formoterol (SYMBICORT) 160-4.5 MCG/ACT Inhaler; Inhale 2 puffs into the lungs 2 times daily    Dyslipidemia  Due for labs  - Lipid panel reflex to direct LDL Fasting; Future    Need for prophylactic vaccination and inoculation against influenza      Screening for diabetes mellitus    - Hemoglobin A1c; Future    Abnormal ultrasound of endometrium  No f/u needed unless symptoms occur see hpi    Mild persistent asthma without complication  improved  - albuterol (VENTOLIN HFA) 108 (90 Base) MCG/ACT inhaler; INHALE 2 PUFFS BY MOUTH EVERY 4 HOURS AS NEEDED FOR SHORTNESS OF BREATH OR WHEEZING    Recheck will depend on lab results  Likely 1-6 months  Patient Instructions   Pap due around 4-     I will follow up with labs once they are back    Please return fasting for cholesterol and diabetes screening, you can make a lab only appointment for this.  No food or drink other than water for 10 hours.          Return in about 1 week (around 10/12/2021) for Lab Work.    Debbie Herman PA-C  [facility]    Bren Orozco is a 56 year old who presents for the following health issues  accompanied by her Self:    HPI     PAP, pt thought she was due and labs needing orders per pt, pt is not fasting today and did not have labs completed.    PCP-Dr. Eason.    Severe persistent Asthma-saw  Dr. Malhotra. On symbicort and ventolin. Seems to be helping a lot.  Symptoms are well controlled. Would like refills.     bmi 37 with HTN comorbidity. Due for fasting labs. H/o pre-diabetes.  She will come back fasting for these.     htn-on medications. No chest pain, shortness of breath, edema, PND, or orthopnea. No dizziness or  vision changes. No side effects from medications. Blood pressure has been stable on medication.    H/o abnormal thickening endometrium on US 2014. Had ok endometrial biopsy and subsequent US 2015 that was showed normal endometrial lining. She has had no bleeding.   I noted this in her chart after reviewing it for some time and answering her questions.       Review of Systems   Constitutional, HEENT, cardiovascular, pulmonary, GI, , musculoskeletal, neuro, skin, endocrine and psych systems are negative, except as otherwise noted.      Objective    /86   Pulse 70   Temp 97.7  F (36.5  C) (Tympanic)   Resp 16   Wt 112.5 kg (248 lb)   SpO2 98%   Breastfeeding No   BMI 37.71 kg/m    Body mass index is 37.71 kg/m .  Physical Exam   GENERAL: alert, no distress and obese  NECK: no adenopathy, no asymmetry, masses, or scars and thyroid normal to palpation  RESP: lungs clear to auscultation - no rales, rhonchi or wheezes  CV: regular rate and rhythm, normal S1 S2, no S3 or S4, no murmur, click or rub, no peripheral edema and peripheral pulses strong  MS: no gross musculoskeletal defects noted, no edema  NEURO: Normal strength and tone, mentation intact and speech normal  PSYCH: mentation appears normal, affect normal/bright        '

## 2021-10-05 ENCOUNTER — OFFICE VISIT (OUTPATIENT)
Dept: FAMILY MEDICINE | Facility: CLINIC | Age: 56
End: 2021-10-05
Payer: COMMERCIAL

## 2021-10-05 VITALS
SYSTOLIC BLOOD PRESSURE: 136 MMHG | HEART RATE: 70 BPM | WEIGHT: 248 LBS | TEMPERATURE: 97.7 F | RESPIRATION RATE: 16 BRPM | OXYGEN SATURATION: 98 % | DIASTOLIC BLOOD PRESSURE: 86 MMHG | BODY MASS INDEX: 37.71 KG/M2

## 2021-10-05 DIAGNOSIS — R93.5 ABNORMAL ULTRASOUND OF ENDOMETRIUM: ICD-10-CM

## 2021-10-05 DIAGNOSIS — Z13.1 SCREENING FOR DIABETES MELLITUS: ICD-10-CM

## 2021-10-05 DIAGNOSIS — J45.30 MILD PERSISTENT ASTHMA WITHOUT COMPLICATION: ICD-10-CM

## 2021-10-05 DIAGNOSIS — J45.50 SEVERE PERSISTENT ASTHMA WITHOUT COMPLICATION (H): ICD-10-CM

## 2021-10-05 DIAGNOSIS — I10 HYPERTENSION GOAL BP (BLOOD PRESSURE) < 140/90: ICD-10-CM

## 2021-10-05 DIAGNOSIS — E78.5 DYSLIPIDEMIA: ICD-10-CM

## 2021-10-05 DIAGNOSIS — Z23 NEED FOR PROPHYLACTIC VACCINATION AND INOCULATION AGAINST INFLUENZA: ICD-10-CM

## 2021-10-05 DIAGNOSIS — E66.01 MORBID OBESITY (H): Primary | ICD-10-CM

## 2021-10-05 PROCEDURE — 99214 OFFICE O/P EST MOD 30 MIN: CPT | Performed by: PHYSICIAN ASSISTANT

## 2021-10-05 RX ORDER — BUDESONIDE AND FORMOTEROL FUMARATE DIHYDRATE 160; 4.5 UG/1; UG/1
2 AEROSOL RESPIRATORY (INHALATION) 2 TIMES DAILY
Qty: 10.2 G | Refills: 3 | Status: SHIPPED | OUTPATIENT
Start: 2021-10-05 | End: 2021-12-27

## 2021-10-05 RX ORDER — ALBUTEROL SULFATE 90 UG/1
AEROSOL, METERED RESPIRATORY (INHALATION)
Qty: 18 G | Refills: 3 | Status: SHIPPED | OUTPATIENT
Start: 2021-10-05 | End: 2022-07-21

## 2021-10-05 NOTE — PATIENT INSTRUCTIONS
Pap due around 4-     I will follow up with labs once they are back    Please return fasting for cholesterol and diabetes screening, you can make a lab only appointment for this.  No food or drink other than water for 10 hours.

## 2021-10-06 ASSESSMENT — ASTHMA QUESTIONNAIRES: ACT_TOTALSCORE: 25

## 2021-10-16 ENCOUNTER — LAB (OUTPATIENT)
Dept: LAB | Facility: CLINIC | Age: 56
End: 2021-10-16
Payer: COMMERCIAL

## 2021-10-16 DIAGNOSIS — E78.5 DYSLIPIDEMIA: ICD-10-CM

## 2021-10-16 DIAGNOSIS — Z13.1 SCREENING FOR DIABETES MELLITUS: ICD-10-CM

## 2021-10-16 DIAGNOSIS — I10 HYPERTENSION GOAL BP (BLOOD PRESSURE) < 140/90: ICD-10-CM

## 2021-10-16 LAB — HBA1C MFR BLD: 5.6 % (ref 0–5.6)

## 2021-10-16 PROCEDURE — 83036 HEMOGLOBIN GLYCOSYLATED A1C: CPT

## 2021-10-16 PROCEDURE — 36415 COLL VENOUS BLD VENIPUNCTURE: CPT

## 2021-10-16 PROCEDURE — 80061 LIPID PANEL: CPT

## 2021-10-16 PROCEDURE — 80053 COMPREHEN METABOLIC PANEL: CPT

## 2021-10-16 NOTE — LETTER
October 19, 2021      Pam Mora  [address] [facility] [address]        Dear ,    We are writing to inform you of your test results.    It was a pleasure to see you at your recent office visit.  Your test results are listed below.  Blood sugar elevated but better than last time. Kidney function normal. Cholesterol worsened but not quite to where you need medications. Recheck fasting labs one year.         If you have any questions or concerns, please call the clinic at [phone].     Resulted Orders   Hemoglobin A1c   Result Value Ref Range    Hemoglobin A1C 5.6 0.0 - 5.6 %      Comment:      Normal <5.7%   Prediabetes 5.7-6.4%    Diabetes 6.5% or higher     Note: Adopted from ADA consensus guidelines.   Comprehensive metabolic panel (BMP + Alb, Alk Phos, ALT, AST, Total. Bili, TP)   Result Value Ref Range    Sodium 138 133 - 144 mmol/L    Potassium 3.5 3.4 - 5.3 mmol/L    Chloride 101 94 - 109 mmol/L    Carbon Dioxide (CO2) 30 20 - 32 mmol/L    Anion Gap 7 3 - 14 mmol/L    Urea Nitrogen 11 7 - 30 mg/dL    Creatinine 0.71 0.52 - 1.04 mg/dL    Calcium 9.4 8.5 - 10.1 mg/dL    Glucose 111 (H) 70 - 99 mg/dL    Alkaline Phosphatase 79 40 - 150 U/L    AST 31 0 - 45 U/L    ALT 58 (H) 0 - 50 U/L    Protein Total 7.4 6.8 - 8.8 g/dL    Albumin 3.9 3.4 - 5.0 g/dL    Bilirubin Total 1.0 0.2 - 1.3 mg/dL    GFR Estimate >90 >60 mL/min/1.73m2      Comment:      As of July 11, 2021, eGFR is calculated by the CKD-EPI creatinine equation, without race adjustment. eGFR can be influenced by muscle mass, exercise, and diet. The reported eGFR is an estimation only and is only applicable if the renal function is stable.   Lipid panel reflex to direct LDL Fasting   Result Value Ref Range    Cholesterol 258 (H) <200 mg/dL    Triglycerides 151 (H) <150 mg/dL    Direct Measure HDL 68 >=50 mg/dL    LDL Cholesterol Calculated 160 (H) <=100 mg/dL    Non HDL Cholesterol 190 (H) <130 mg/dL    Patient Fasting >  8hrs? Yes     Narrative    Cholesterol  Desirable:  <200 mg/dL    Triglycerides  Normal:  Less than 150 mg/dL  Borderline High:  150-199 mg/dL  High:  200-499 mg/dL  Very High:  Greater than or equal to 500 mg/dL    Direct Measure HDL  Female:  Greater than or equal to 50 mg/dL   Male:  Greater than or equal to 40 mg/dL    LDL Cholesterol  Desirable:  <100mg/dL  Above Desirable:  100-129 mg/dL   Borderline High:  130-159 mg/dL   High:  160-189 mg/dL   Very High:  >= 190 mg/dL    Non HDL Cholesterol  Desirable:  130 mg/dL  Above Desirable:  130-159 mg/dL  Borderline High:  160-189 mg/dL  High:  190-219 mg/dL  Very High:  Greater than or equal to 220 mg/dL       If you have any questions or concerns, please call the clinic at the number listed above.       Sincerely,    Debbie Herman PA-C

## 2021-10-18 LAB
ALBUMIN SERPL-MCNC: 3.9 G/DL (ref 3.4–5)
ALP SERPL-CCNC: 79 U/L (ref 40–150)
ALT SERPL W P-5'-P-CCNC: 58 U/L (ref 0–50)
ANION GAP SERPL CALCULATED.3IONS-SCNC: 7 MMOL/L (ref 3–14)
AST SERPL W P-5'-P-CCNC: 31 U/L (ref 0–45)
BILIRUB SERPL-MCNC: 1 MG/DL (ref 0.2–1.3)
BUN SERPL-MCNC: 11 MG/DL (ref 7–30)
CALCIUM SERPL-MCNC: 9.4 MG/DL (ref 8.5–10.1)
CHLORIDE BLD-SCNC: 101 MMOL/L (ref 94–109)
CHOLEST SERPL-MCNC: 258 MG/DL
CO2 SERPL-SCNC: 30 MMOL/L (ref 20–32)
CREAT SERPL-MCNC: 0.71 MG/DL (ref 0.52–1.04)
FASTING STATUS PATIENT QL REPORTED: YES
GFR SERPL CREATININE-BSD FRML MDRD: >90 ML/MIN/1.73M2
GLUCOSE BLD-MCNC: 111 MG/DL (ref 70–99)
HDLC SERPL-MCNC: 68 MG/DL
LDLC SERPL CALC-MCNC: 160 MG/DL
NONHDLC SERPL-MCNC: 190 MG/DL
POTASSIUM BLD-SCNC: 3.5 MMOL/L (ref 3.4–5.3)
PROT SERPL-MCNC: 7.4 G/DL (ref 6.8–8.8)
SODIUM SERPL-SCNC: 138 MMOL/L (ref 133–144)
TRIGL SERPL-MCNC: 151 MG/DL

## 2021-10-18 NOTE — RESULT ENCOUNTER NOTE
PLEASE CALL PATIENT:  Dear Pam,      It was a pleasure to see you at your recent office visit.  Your test results are listed below.  Blood sugar elevated but better than last time. Kidney function normal. Cholesterol worsened but not quite to where you need medications. Recheck fasting labs one year.         If you have any questions or concerns, please call the clinic at [phone].    Sincerely,  Debbie Herman PA-C

## 2021-11-02 ENCOUNTER — OFFICE VISIT (OUTPATIENT)
Dept: FAMILY MEDICINE | Facility: CLINIC | Age: 56
End: 2021-11-02
Payer: COMMERCIAL

## 2021-11-02 VITALS
OXYGEN SATURATION: 95 % | HEIGHT: 68 IN | HEART RATE: 95 BPM | WEIGHT: 242 LBS | DIASTOLIC BLOOD PRESSURE: 89 MMHG | BODY MASS INDEX: 36.68 KG/M2 | SYSTOLIC BLOOD PRESSURE: 139 MMHG | TEMPERATURE: 98.2 F

## 2021-11-02 DIAGNOSIS — R06.2 WHEEZING: ICD-10-CM

## 2021-11-02 DIAGNOSIS — J01.90 ACUTE SINUSITIS WITH SYMPTOMS GREATER THAN 10 DAYS: Primary | ICD-10-CM

## 2021-11-02 DIAGNOSIS — J45.50 SEVERE PERSISTENT ASTHMA WITHOUT COMPLICATION (H): ICD-10-CM

## 2021-11-02 PROCEDURE — 99214 OFFICE O/P EST MOD 30 MIN: CPT | Performed by: NURSE PRACTITIONER

## 2021-11-02 RX ORDER — PREDNISONE 20 MG/1
40 TABLET ORAL DAILY
Qty: 10 TABLET | Refills: 0 | Status: SHIPPED | OUTPATIENT
Start: 2021-11-02 | End: 2021-11-07

## 2021-11-02 ASSESSMENT — MIFFLIN-ST. JEOR: SCORE: 1736.2

## 2021-11-02 ASSESSMENT — PAIN SCALES - GENERAL: PAINLEVEL: NO PAIN (0)

## 2021-11-02 NOTE — PROGRESS NOTES
"  Assessment & Plan     Acute sinusitis with symptoms greater than 10 days  Start augmentin and prednisone burst.  Continue inhalers.   Follow-up if not improving.   - amoxicillin-clavulanate (AUGMENTIN) 875-125 MG tablet; Take 1 tablet by mouth 2 times daily for 7 days  - predniSONE (DELTASONE) 20 MG tablet; Take 2 tablets (40 mg) by mouth daily for 5 days    Severe persistent asthma without complication  Exacerbation  - predniSONE (DELTASONE) 20 MG tablet; Take 2 tablets (40 mg) by mouth daily for 5 days    Wheezing  - predniSONE (DELTASONE) 20 MG tablet; Take 2 tablets (40 mg) by mouth daily for 5 days        BMI:   Estimated body mass index is 36.8 kg/m  as calculated from the following:    Height as of this encounter: 1.727 m (5' 8\").    Weight as of this encounter: 109.8 kg (242 lb).         Return in about 2 weeks (around 11/16/2021) for If failure to improve, or sooner if worsening.    Jennifer Spring, [facility]   Pam is a 56 year old who presents for the following health issues     HPI         Head cold x 2 weeks.   Lots of nasal congestion- states copious amounts.  Tan/green in color.   Moved into chest, cough.  Productive in the morning, otherwise non-productive. Having some wheezing, hx of asthma.  Denies any chest pain or shortness of breath.  No fevers.      Had COVID-19 early this year, feels different.  Has not been vaccinated.         Review of Systems   Constitutional, HEENT, cardiovascular, pulmonary, gi and gu systems are negative, except as otherwise noted.      Objective    /89   Pulse 95   Temp 98.2  F (36.8  C)   Ht 1.727 m (5' 8\")   Wt 109.8 kg (242 lb)   SpO2 95%   BMI 36.80 kg/m    Body mass index is 36.8 kg/m .  Physical Exam   GENERAL: healthy, alert and no distress  EYES: Eyes grossly normal to inspection, PERRL and conjunctivae and sclerae normal  HENT: normal cephalic/atraumatic, ear canals and TM's normal, nasal mucosa " edematous , oropharynx clear and oral mucous membranes moist  NECK: no adenopathy, no asymmetry, masses, or scars and thyroid normal to palpation  RESP: expiratory wheezing in left upper lung, otherwise clear with good air movement. Occasional non-productive cough.     CV: regular rate and rhythm, normal S1 S2, no S3 or S4, no murmur, click or rub, no peripheral edema and peripheral pulses strong  MS: no gross musculoskeletal defects noted, no edema  SKIN: no suspicious lesions or rashes  NEURO: Normal strength and tone, mentation intact and speech normal

## 2021-11-02 NOTE — PATIENT INSTRUCTIONS
Start prednisone 40 mg once daily for 5 days.  Take with food.   Start Augmentin 1 pill twice daily for 7 days.

## 2021-11-09 DIAGNOSIS — I10 HYPERTENSION GOAL BP (BLOOD PRESSURE) < 140/90: ICD-10-CM

## 2021-11-09 RX ORDER — AMLODIPINE BESYLATE 5 MG/1
5 TABLET ORAL DAILY
Qty: 90 TABLET | Refills: 3 | Status: CANCELLED | OUTPATIENT
Start: 2021-11-09

## 2021-11-09 RX ORDER — ATENOLOL 50 MG/1
50 TABLET ORAL DAILY
Qty: 90 TABLET | Refills: 3 | Status: CANCELLED | OUTPATIENT
Start: 2021-11-09

## 2021-11-09 RX ORDER — CHLORTHALIDONE 25 MG/1
25 TABLET ORAL DAILY
Qty: 90 TABLET | Refills: 3 | Status: CANCELLED | OUTPATIENT
Start: 2021-11-09

## 2021-11-10 NOTE — TELEPHONE ENCOUNTER
Patient reports, she was originally transferring prescriptions to [facility], however it is noted, there has been no communication from [facility] regarding refills, per Electronic Health Record.  Patient wants to  prescriptions at [facility].  You have remaining refill(s) 2/10/21.  Often time the pharmacy's automated system does not recognize new medications that are sent.  You will need to call and speak with someone directly.      Verbalized good understanding.   Alis Aguilar RN

## 2021-12-27 ENCOUNTER — OFFICE VISIT (OUTPATIENT)
Dept: FAMILY MEDICINE | Facility: CLINIC | Age: 56
End: 2021-12-27
Payer: COMMERCIAL

## 2021-12-27 ENCOUNTER — ANCILLARY PROCEDURE (OUTPATIENT)
Dept: GENERAL RADIOLOGY | Facility: CLINIC | Age: 56
End: 2021-12-27
Attending: FAMILY MEDICINE
Payer: COMMERCIAL

## 2021-12-27 VITALS — HEART RATE: 62 BPM | SYSTOLIC BLOOD PRESSURE: 149 MMHG | TEMPERATURE: 98.8 F | DIASTOLIC BLOOD PRESSURE: 91 MMHG

## 2021-12-27 DIAGNOSIS — R14.0 ABDOMINAL BLOATING: ICD-10-CM

## 2021-12-27 DIAGNOSIS — R14.0 ABDOMINAL BLOATING: Primary | ICD-10-CM

## 2021-12-27 DIAGNOSIS — J45.50 SEVERE PERSISTENT ASTHMA WITHOUT COMPLICATION (H): ICD-10-CM

## 2021-12-27 DIAGNOSIS — E66.01 MORBID OBESITY (H): ICD-10-CM

## 2021-12-27 DIAGNOSIS — K59.00 CONSTIPATION, UNSPECIFIED CONSTIPATION TYPE: ICD-10-CM

## 2021-12-27 LAB
ALBUMIN SERPL-MCNC: 4.1 G/DL (ref 3.4–5)
ALP SERPL-CCNC: 84 U/L (ref 40–150)
ALT SERPL W P-5'-P-CCNC: 56 U/L (ref 0–50)
AMYLASE SERPL-CCNC: 38 U/L (ref 30–110)
AST SERPL W P-5'-P-CCNC: 33 U/L (ref 0–45)
BILIRUB DIRECT SERPL-MCNC: 0.2 MG/DL (ref 0–0.2)
BILIRUB SERPL-MCNC: 0.9 MG/DL (ref 0.2–1.3)
ERYTHROCYTE [DISTWIDTH] IN BLOOD BY AUTOMATED COUNT: 13.1 % (ref 10–15)
HCT VFR BLD AUTO: 45.6 % (ref 35–47)
HGB BLD-MCNC: 15.1 G/DL (ref 11.7–15.7)
LIPASE SERPL-CCNC: 141 U/L (ref 73–393)
MCH RBC QN AUTO: 32.2 PG (ref 26.5–33)
MCHC RBC AUTO-ENTMCNC: 33.1 G/DL (ref 31.5–36.5)
MCV RBC AUTO: 97 FL (ref 78–100)
PLATELET # BLD AUTO: 235 10E3/UL (ref 150–450)
PROT SERPL-MCNC: 8.1 G/DL (ref 6.8–8.8)
RBC # BLD AUTO: 4.69 10E6/UL (ref 3.8–5.2)
TSH SERPL DL<=0.005 MIU/L-ACNC: 1.78 MU/L (ref 0.4–4)
WBC # BLD AUTO: 7.4 10E3/UL (ref 4–11)

## 2021-12-27 PROCEDURE — 36415 COLL VENOUS BLD VENIPUNCTURE: CPT | Performed by: FAMILY MEDICINE

## 2021-12-27 PROCEDURE — 82150 ASSAY OF AMYLASE: CPT | Performed by: FAMILY MEDICINE

## 2021-12-27 PROCEDURE — 84443 ASSAY THYROID STIM HORMONE: CPT | Performed by: FAMILY MEDICINE

## 2021-12-27 PROCEDURE — 80076 HEPATIC FUNCTION PANEL: CPT | Performed by: FAMILY MEDICINE

## 2021-12-27 PROCEDURE — 99214 OFFICE O/P EST MOD 30 MIN: CPT | Performed by: FAMILY MEDICINE

## 2021-12-27 PROCEDURE — 85027 COMPLETE CBC AUTOMATED: CPT | Performed by: FAMILY MEDICINE

## 2021-12-27 PROCEDURE — 74019 RADEX ABDOMEN 2 VIEWS: CPT | Performed by: RADIOLOGY

## 2021-12-27 PROCEDURE — 83690 ASSAY OF LIPASE: CPT | Performed by: FAMILY MEDICINE

## 2021-12-27 RX ORDER — BUDESONIDE AND FORMOTEROL FUMARATE DIHYDRATE 160; 4.5 UG/1; UG/1
2 AEROSOL RESPIRATORY (INHALATION) 2 TIMES DAILY
Qty: 30.6 G | Refills: 1 | Status: SHIPPED | OUTPATIENT
Start: 2021-12-27 | End: 2022-06-20

## 2021-12-27 RX ORDER — POLYETHYLENE GLYCOL 3350 17 G/17G
1 POWDER, FOR SOLUTION ORAL DAILY
Qty: 507 G | Refills: 1 | Status: SHIPPED | OUTPATIENT
Start: 2021-12-27 | End: 2024-01-04

## 2021-12-27 NOTE — PROGRESS NOTES
SUBJECTIVE  HPI: Pam Mora is a 56 year old female who presents with the CC of abdominal/pelvic pain.  She reports that she has had abdominal bloating on and off for years   It is worse in the last 6 month(s) and she has had it daily.  When she eats she gets full quickly  She denies any weight loss despite trying    Her mother had pancreatic cancer    She denies fevers, chills, night sweats     She is having a bowel movement twice a day(s)   They are a type 3-4 on the House stool scale   She can pass then without  straining   She had a colonooscopy was a couple month(s) ago.       It is occasionally painful but describes it more as a discomfort  Stiill has her uterus.   She went through menopause was at age 47     She gets heart burn about 2 times a month(s) burning sensation in her chest which is usually after eating spicy food      Social History     Socioeconomic History     Marital status:      Spouse name: Terry     Number of children: 1     Years of education: 12     Highest education level: Not on file   Occupational History     Occupation:      Employer: [employer]   Tobacco Use     Smoking status: Former Smoker     Packs/day: 0.00     Years: 0.00     Pack years: 0.00     Quit date: 2008     Years since quittin.9     Smokeless tobacco: Never Used   Substance and Sexual Activity     Alcohol use: Yes     Comment: 3 drinks a week     Drug use: No     Sexual activity: Yes     Partners: Male     Birth control/protection: Surgical     Comment: -vasectomy   Other Topics Concern     Parent/sibling w/ CABG, MI or angioplasty before 65F 55M? No   Social History Narrative    Has a son, age 15 and 2 step-daughters     Social Determinants of Health     Financial Resource Strain: Not on file   Food Insecurity: Not on file   Transportation Needs: Not on file   Physical Activity: Not on file   Stress: Not on file   Social Connections: Not on file   Intimate Partner  Violence: Not on file   Housing Stability: Not on file           EXAMINATION:  BP (!) 149/91   Pulse 62   Temp 98.8  F (37.1  C) (Oral)     GENERAL APPEARANCE: healthy, alert and no distress  EYES: EOMI,  PERRL, conjunctiva clear  HENT: ear canals and TM's normal.  Nose and mouth without ulcers, erythema or lesions  NECK: supple, nontender, no lymphadenopathy  RESP: lungs clear to auscultation - no rales, rhonchi or wheezes  CV: regular rates and rhythm, normal S1 S2, no murmur noted  ABDOMEN:  Mild firmness, nontender, no HSM or masses and bowel sounds normal  ABDOMEN: no guarding, rebound  SKIN: no suspicious lesions or rashes  EXTREMITIES: non tender     Results for orders placed or performed in visit on 12/27/21   CBC with platelets     Status: Normal   Result Value Ref Range    WBC Count 7.4 4.0 - 11.0 10e3/uL    RBC Count 4.69 3.80 - 5.20 10e6/uL    Hemoglobin 15.1 11.7 - 15.7 g/dL    Hematocrit 45.6 35.0 - 47.0 %    MCV 97 78 - 100 fL    MCH 32.2 26.5 - 33.0 pg    MCHC 33.1 31.5 - 36.5 g/dL    RDW 13.1 10.0 - 15.0 %    Platelet Count 235 150 - 450 10e3/uL           XRAY:  I see significant(ly) stool collection in the descending and ascending colon      Narrative & Impression  XR ABDOMEN 2VIEWS 12/27/2021 11:38 AM     HISTORY: Abdominal bloating     COMPARISON: Abdomen x-ray 10/28/2018                                                                      IMPRESSION: Nonobstructive bowel gas pattern. No definite free air. No  definite renal stone. Right total hip arthroplasty.     SIDDHARTHA LIPSCOMB MD         SYSTEM ID:  [id number]      Colonooscopy in September 2021  She had one tubular adenoma amd one hyperplastic polyp     ASSESSMENT AND PLAN:  Differential diagnosis includes:   GERD  PUD  GASTRITIS  H PYLORI INFECTION CAUSING GASTRITIS  CHOLELITIASIS/CHOLECYSTITIS  PANCREATITIS  HEPATITIS    At this point we will obtain the following evaluation:   A laboratory evaluation including: CBC, H. Pylori, LFT'S,  lipase, amylase,       At this point we will start treatment with Miralax    Follow up in clinic in 3-4 weeks     Consider a CT scan if not improving .  _

## 2021-12-29 ENCOUNTER — TELEPHONE (OUTPATIENT)
Dept: FAMILY MEDICINE | Facility: CLINIC | Age: 56
End: 2021-12-29
Payer: COMMERCIAL

## 2021-12-29 ENCOUNTER — E-VISIT (OUTPATIENT)
Dept: FAMILY MEDICINE | Facility: CLINIC | Age: 56
End: 2021-12-29
Payer: COMMERCIAL

## 2021-12-29 DIAGNOSIS — J06.9 UPPER RESPIRATORY TRACT INFECTION, UNSPECIFIED TYPE: Primary | ICD-10-CM

## 2021-12-29 PROCEDURE — 99421 OL DIG E/M SVC 5-10 MIN: CPT | Performed by: FAMILY MEDICINE

## 2021-12-29 NOTE — LETTER
December 29, 2021      Pam Mora  [id number] [id number]      To whom it may concern:    RE: Pam Orozco has had a cough for a few days. Her covid 19 test PCR was negative today. She may return to work at any time.     Please contact me for questions or concerns.      Sincerely,        FAVIAN GOLD MD   "GASTROENTEROLOGY PROGRESS NOTE     SUBJECTIVE: Feeling much better today. Denies nausea or vomiting. Passed some hard stools this morning. Passing flatus    OBJECTIVE:   /78   Pulse 71   Temp 97.3  F (36.3  C) (Oral)   Resp 16   Ht 1.88 m (6' 2\")   Wt 130.6 kg (288 lb)   SpO2 95%   BMI 36.98 kg/m     Temp (24hrs), Av.9  F (36.6  C), Min:96.9  F (36.1  C), Max:100  F (37.8  C)     No data found.     Intake/Output Summary (Last 24 hours) at 2020 1206  Last data filed at 2020 0646  Gross per 24 hour   Intake 4878 ml   Output 1750 ml   Net 3128 ml      PHYSICAL EXAM   Constitutional: Age appropriate, in bed, no acute distress  Cardiovascular: Regular rate and rhythm. No murmurs rubs or gallops  Respiratory: Clear to auscultation bilaterally   Abdomen: Soft, non-tender, non-distended, hypoactive bowel sounds. No masses or hepatosplenomegaly appreciated. No guarding or rebound tenderness.    Additional Comments:   ROS, FH, SH: See initial GI consult for details.     I have reviewed the patient's new clinical lab results:   Recent Labs   Lab Test 20  0752 20  0706 09/15/20  0720  0620  1419   WBC  --  8.3  --  11.8* 21.1*   HGB  --  14.9  --  13.9 17.2   MCV  --  91  --  93 91   PLT  --  199  --  153 236   INR 1.75* 2.02* 2.49* 3.55* 3.97*      Recent Labs   Lab Test 20  0706 20  0651 20  1419    138 136   POTASSIUM 3.1* 3.7 3.7   CHLORIDE 107 108 103   CO2 27 27 27   BUN 10 19 18   CR 0.83 1.05 0.92   ANIONGAP 5 3 6   PETER 8.2* 7.4* 9.0      Recent Labs   Lab Test 09/15/20  0742 20  0651 20  1419   ALBUMIN 2.6* 2.8* 3.8   BILITOTAL 1.5* 2.3* 2.1*   ALT 18 22 32   AST 16 17 25   ALKPHOS 50 53 70   PROTEIN  --   --  30*   LIPASE  --   --  86      20 CT abdomen pelvis  IMPRESSION:   1.  Low-grade small bowel obstruction with gradual transition point in  the anterior left lower quadrant.  2.  Short segment mural thickening of " distal small bowel loops, remote  and proximal transition point, may be reactive.  3.  Prominent descending duodenal diverticulum with adjacent  thickening and enhancement. The thickening and enhancement may be due  to adjacent compressed pancreatic parenchyma, though duodenal  diverticulitis or malignancy can have a similar appearance. Close  clinical follow-up and consideration for endoscopy for further  evaluation.  4.  Distal colonic diverticulosis without acute diverticulitis.  5.  Cholelithiasis.  6.  Hepatic steatosis and suggestion of chronic liver disease  (cirrhosis).     Assessment: 71-year-old male presenting with abdominal pain.  He had similar pain about 1 month ago that resolved spontaneously with dietary restriction. His CT suggests a SBO in the LLQ and short segment mural thickening in the distal small bowel. There is also a duodenal diverticulum with thickening and enhancement which could represent diverticulitis. Given his white count on admission diverticulitis is within the differential. The source for his SBO is unclear. Given the small bowel thickening inflammatory bowel disease is within the differential as is malignancy. Attempted bowel prep yesterday with no stool output. NG placed with over 5 liters removed. Surgery consulted, planning a contrast challenge overnight    Plan:   -Continue NG decompression  -Surgical consultation appreciated  -EGD/Colonoscopy on hold for now  -Continue IV fluids and supportive care  -EtOH cessation  -Outpatient evaluation in our Hepatology Clinic  -MNLUIS FELIPE following    [facility]  Cell:  [phone] Monday through Friday [id number]  Office: [phone]

## 2021-12-29 NOTE — PATIENT INSTRUCTIONS
Thank you for choosing us for your care. Given your symptoms, I would like you to do a lab-only visit to determine what is causing them.  I have placed the orders.  Please schedule an appointment with the lab right here in [place], or call [phone].  I will let you know when the results are back and next steps to take.

## 2021-12-29 NOTE — TELEPHONE ENCOUNTER
Patient is calling to request a note from a provider to clear her to return to work after taking an at home covid test that was negative.   She has a cough but no other symptoms. Has asthma so she thinks cough is due to that.    Advised patient to schedule an evisit with a provider. Sent [employer] message with instructions to schedule an evisit. Patient is agreeable to plan.    Jennifer Swanson RN  [facility]

## 2022-01-01 NOTE — RESULT ENCOUNTER NOTE
Pam,  I have reviewed the results of the laboratory tests that we recently ordered. All of the laboratory that are back so far are normal or considered normal for you. There are still some labs pending and I will let you know those results when they   are available.  Sincerely,   Frantz Horton MD

## 2022-01-06 NOTE — PROGRESS NOTES
"  Assessment & Plan       ICD-10-CM    1. Cough  R05.9 azithromycin (ZITHROMAX) 250 MG tablet     predniSONE (DELTASONE) 20 MG tablet     XR Chest 2 Views   2. Severe persistent asthma with exacerbation  J45.51 azithromycin (ZITHROMAX) 250 MG tablet     predniSONE (DELTASONE) 20 MG tablet     XR Chest 2 Views   3. Oral thrush  B37.0 nystatin (MYCOSTATIN) 404155 UNIT/ML suspension   Talk to patient about her concerns we will get her started on prednisone and Z-Kevin.  Also get her started on nystatin for possible oral thrush.  She will recheck in 2weeks.  Warning signs side effects were discussed.  She can continue Tylenol or Profen as needed.  Encouraged her to get Covid and influenza vaccinated.  She is nothing about this.    Return in about 2 weeks (around 1/21/2022) for recheck.    OBDULIO [facility]   Pam is a 56 year old who presents for the following health issues     HPI     Acute Illness  Acute illness concerns: cough  Onset/Duration: 1.5 weeks   Symptoms:  Fever: had a low grade fever the first day but has resolved   Chills/Sweats: no  Headache (location?): no  Sinus Pressure: no  Conjunctivitis:  no  Ear Pain: no  Rhinorrhea: no  Congestion: no  Sore Throat: no  Cough: YES  Wheeze: no  Decreased Appetite: no  Nausea: no  Vomiting: no  Diarrhea: no  Dysuria/Freq.: no  Dysuria or Hematuria: no  Fatigue/Achiness: no  Sick/Strep Exposure: no  Therapies tried and outcome: musinex   Had negative at home covid test done    History of asthma.   \"rattle in lungs\"   Very hard, harsh cough.  Albuterol helps little.     Had covid last year, not vaccinated.     Review of Systems   Constitutional, HEENT, cardiovascular, pulmonary, gi and gu systems are negative, except as otherwise noted.      Objective    /85   Pulse 95   Temp 98.6  F (37  C) (Tympanic)   Resp 18   SpO2 97%   There is no height or weight on file to calculate BMI.  Physical Exam "   GENERAL: healthy, alert and no distress  EYES: Eyes grossly normal to inspection, PERRL and conjunctivae and sclerae normal  HENT: ear canals and TM's normal, nose and mouth without ulcers or lesions-  White plaques posterior oral pharynx-   NECK: no adenopathy, no asymmetry, masses, or scars and thyroid normal to palpation  RESP: lungs clear to auscultation - no rales, rhonchi or wheezes  CV: regular rate and rhythm, normal S1 S2, no S3 or S4, no murmur, click or rub, no peripheral edema and peripheral pulses strong  NEURO: Normal strength and tone, mentation intact and speech normal  PSYCH: mentation appears normal, affect normal/bright    X-ray chest: neg. pending radiology

## 2022-01-07 ENCOUNTER — ANCILLARY PROCEDURE (OUTPATIENT)
Dept: GENERAL RADIOLOGY | Facility: CLINIC | Age: 57
End: 2022-01-07
Attending: PHYSICIAN ASSISTANT
Payer: COMMERCIAL

## 2022-01-07 ENCOUNTER — OFFICE VISIT (OUTPATIENT)
Dept: FAMILY MEDICINE | Facility: CLINIC | Age: 57
End: 2022-01-07
Payer: COMMERCIAL

## 2022-01-07 VITALS
RESPIRATION RATE: 18 BRPM | OXYGEN SATURATION: 97 % | TEMPERATURE: 98.6 F | HEART RATE: 95 BPM | DIASTOLIC BLOOD PRESSURE: 85 MMHG | SYSTOLIC BLOOD PRESSURE: 137 MMHG

## 2022-01-07 DIAGNOSIS — B37.0 ORAL THRUSH: ICD-10-CM

## 2022-01-07 DIAGNOSIS — R05.9 COUGH: Primary | ICD-10-CM

## 2022-01-07 DIAGNOSIS — J45.51 SEVERE PERSISTENT ASTHMA WITH EXACERBATION (H): ICD-10-CM

## 2022-01-07 PROCEDURE — 71046 X-RAY EXAM CHEST 2 VIEWS: CPT | Performed by: RADIOLOGY

## 2022-01-07 PROCEDURE — 99214 OFFICE O/P EST MOD 30 MIN: CPT | Performed by: PHYSICIAN ASSISTANT

## 2022-01-07 RX ORDER — AZITHROMYCIN 250 MG/1
TABLET, FILM COATED ORAL
Qty: 6 TABLET | Refills: 0 | Status: SHIPPED | OUTPATIENT
Start: 2022-01-07 | End: 2022-01-12

## 2022-01-07 RX ORDER — NYSTATIN 100000/ML
500000 SUSPENSION, ORAL (FINAL DOSE FORM) ORAL 4 TIMES DAILY
Qty: 280 ML | Refills: 0 | Status: SHIPPED | OUTPATIENT
Start: 2022-01-07 | End: 2022-01-21

## 2022-01-07 RX ORDER — PREDNISONE 20 MG/1
20 TABLET ORAL DAILY
Qty: 20 TABLET | Refills: 0 | Status: SHIPPED | OUTPATIENT
Start: 2022-01-07 | End: 2022-02-22

## 2022-01-07 ASSESSMENT — PAIN SCALES - GENERAL: PAINLEVEL: NO PAIN (0)

## 2022-01-07 NOTE — RESULT ENCOUNTER NOTE
Ms. Mora,    Your x-ray was read as normal by the radiologist.     Please contact the clinic if you have additional questions.  Thank you.    Sincerely,    Gustabo Duvall PA-C

## 2022-02-21 NOTE — PROGRESS NOTES
"  Assessment & Plan     Tension headache  Trial of PT, heat to shoulders, stretch frequently, keep headache diary to see if there is triggers  Follow-up if not improving  - Physical Therapy Referral; Future    Orthostatic hypotension  Will have her move atenolol dosing to night time to see if that helps, monitor pulse and BP through the day to see highs and lows               BMI:   Estimated body mass index is 36.8 kg/m  as calculated from the following:    Height as of this encounter: 1.727 m (5' 8\").    Weight as of 11/2/21: 109.8 kg (242 lb).   Weight management plan: Discussed healthy diet and exercise guidelines        No follow-ups on file.    Ruma Russell MD  [facility]   Pam is a 56 year old who presents for the following health issues     History of Present Illness       Migraines:   Since the patient's last clinic visit, headaches are: worsened  The patient is getting headaches:  Daily  She is not able to do normal daily activities when she has a migraine.  The patient is taking the following rescue/relief medications:  Ibuprofen (Advil, Motrin)   Patient states \"I get some relief\" from the rescue/relief medications.   The patient is taking the following medications to prevent migraines:  No medications to prevent migraines  In the past 4 weeks, the patient has gone to an Urgent Care or Emergency Room 0 times times due to headaches.    She eats 2-3 servings of fruits and vegetables daily.She consumes 0 sweetened beverage(s) daily.She exercises with enough effort to increase her heart rate 30 to 60 minutes per day.  She exercises with enough effort to increase her heart rate 3 or less days per week.   She is taking medications regularly.     Headaches are different then her migraines  These ones start in the back. Go ? To top of head  They are more constant pressure  Headaches now occurring daily for past month  Taking advil which helps, but tries not to " "take it.   Sometimes wakes up with headaches but it does not wake her up  No neurological symptoms with it.     Gets some lightheadedness with sitting to standing  Has not changed her BP meds recently  Drinks one cup of coffee and lots of water.   Has not lost any weight  Gets dizzy when she is at work and she sits and gets up  Takes her medication in the morning  Does not have the light headedness in the morning before she takes her medication  Reviewed labwork from 2 months ago and was normal including cbc, tsh and bmp      Review of Systems   Constitutional, HEENT, cardiovascular, pulmonary, gi and gu systems are negative, except as otherwise noted.      Objective    /83   Pulse 69   Temp 98.4  F (36.9  C) (Oral)   Resp 17   Ht 1.727 m (5' 8\")   BMI 36.80 kg/m    Body mass index is 36.8 kg/m .  Physical Exam   GENERAL: healthy, alert and no distress  EYES: Eyes grossly normal to inspection, PERRL and conjunctivae and sclerae normal  NECK: no adenopathy, no asymmetry, masses, or scars and thyroid normal to palpation  RESP: lungs clear to auscultation - no rales, rhonchi or wheezes  CV: regular rate and rhythm, normal S1 S2, no S3 or S4, no murmur, click or rub, no peripheral edema and peripheral pulses strong  ABDOMEN: soft, nontender, no hepatosplenomegaly, no masses and bowel sounds normal  NEURO: Normal strength and tone, mentation intact and speech normal  NEURO: Normal strength and tone, sensory exam grossly normal, proprioception normal, mentation intact, cranial nerves 2-12 intact, gait normal including heel/toe/tandem walking, Romberg normal and rapid alternating movements normal  PSYCH: mentation appears normal, affect normal/bright    No results found for this or any previous visit (from the past 24 hour(s)).            "

## 2022-02-22 ENCOUNTER — OFFICE VISIT (OUTPATIENT)
Dept: FAMILY MEDICINE | Facility: CLINIC | Age: 57
End: 2022-02-22
Payer: COMMERCIAL

## 2022-02-22 VITALS
HEIGHT: 68 IN | HEART RATE: 69 BPM | BODY MASS INDEX: 36.8 KG/M2 | SYSTOLIC BLOOD PRESSURE: 139 MMHG | RESPIRATION RATE: 17 BRPM | DIASTOLIC BLOOD PRESSURE: 83 MMHG | TEMPERATURE: 98.4 F

## 2022-02-22 DIAGNOSIS — E66.01 MORBID OBESITY (H): ICD-10-CM

## 2022-02-22 DIAGNOSIS — I95.1 ORTHOSTATIC HYPOTENSION: ICD-10-CM

## 2022-02-22 DIAGNOSIS — G44.209 TENSION HEADACHE: Primary | ICD-10-CM

## 2022-02-22 PROCEDURE — 99214 OFFICE O/P EST MOD 30 MIN: CPT | Performed by: FAMILY MEDICINE

## 2022-02-22 ASSESSMENT — ASTHMA QUESTIONNAIRES: ACT_TOTALSCORE: 15

## 2022-02-22 ASSESSMENT — PAIN SCALES - GENERAL: PAINLEVEL: NO PAIN (0)

## 2022-03-21 ENCOUNTER — APPOINTMENT (OUTPATIENT)
Dept: URBAN - METROPOLITAN AREA CLINIC 252 | Age: 57
Setting detail: DERMATOLOGY
End: 2022-03-22

## 2022-03-21 VITALS — WEIGHT: 240 LBS | RESPIRATION RATE: 16 BRPM | HEIGHT: 68 IN

## 2022-03-21 DIAGNOSIS — L82.1 OTHER SEBORRHEIC KERATOSIS: ICD-10-CM

## 2022-03-21 DIAGNOSIS — L57.0 ACTINIC KERATOSIS: ICD-10-CM

## 2022-03-21 DIAGNOSIS — Z71.89 OTHER SPECIFIED COUNSELING: ICD-10-CM

## 2022-03-21 PROCEDURE — OTHER LIQUID NITROGEN: OTHER

## 2022-03-21 PROCEDURE — 99212 OFFICE O/P EST SF 10 MIN: CPT | Mod: 25

## 2022-03-21 PROCEDURE — OTHER PRESCRIPTION: OTHER

## 2022-03-21 PROCEDURE — 17004 DESTROY PREMAL LESIONS 15/>: CPT

## 2022-03-21 PROCEDURE — OTHER COUNSELING: OTHER

## 2022-03-21 RX ORDER — FLUOROURACIL 2 G/40G
5% CREAM TOPICAL BID
Qty: 40 | Refills: 0 | Status: ERX | COMMUNITY
Start: 2022-03-21

## 2022-03-21 RX ORDER — CALCIPOTRIENE 0.05 MG/G
0.005% CREAM TOPICAL QHS
Qty: 60 | Refills: 0 | Status: ERX | COMMUNITY
Start: 2022-03-21

## 2022-03-21 ASSESSMENT — LOCATION DETAILED DESCRIPTION DERM
LOCATION DETAILED: LEFT ANTERIOR DISTAL THIGH
LOCATION DETAILED: RIGHT LATERAL SUPERIOR CHEST
LOCATION DETAILED: RIGHT LATERAL DISTAL PRETIBIAL REGION
LOCATION DETAILED: DORSAL INTERPHALANGEAL JOINT RIGHT THUMB
LOCATION DETAILED: RIGHT LATERAL DORSAL FOOT
LOCATION DETAILED: RIGHT SUPERIOR LATERAL MIDBACK
LOCATION DETAILED: LEFT MEDIAL GREAT TOE
LOCATION DETAILED: LEFT DORSAL FOOT
LOCATION DETAILED: LEFT LATERAL DORSAL FOOT
LOCATION DETAILED: RIGHT DORSAL INDEX FINGER METACARPOPHALANGEAL JOINT
LOCATION DETAILED: RIGHT MEDIAL SUPERIOR CHEST
LOCATION DETAILED: LEFT PROXIMAL DORSAL INDEX FINGER
LOCATION DETAILED: RIGHT DORSAL FOOT
LOCATION DETAILED: LEFT ANTERIOR PROXIMAL THIGH
LOCATION DETAILED: RIGHT PROXIMAL DORSAL RING FINGER
LOCATION DETAILED: LEFT DORSAL INDEX FINGER METACARPOPHALANGEAL JOINT
LOCATION DETAILED: LEFT MEDIAL BREAST 10-11:00 REGION
LOCATION DETAILED: RIGHT ULNAR DORSAL HAND
LOCATION DETAILED: LEFT ULNAR DORSAL HAND
LOCATION DETAILED: RIGHT ANTERIOR SHOULDER
LOCATION DETAILED: LEFT CLAVICULAR SKIN
LOCATION DETAILED: RIGHT MID DORSAL MIDDLE FINGER
LOCATION DETAILED: RIGHT DISTAL PRETIBIAL REGION
LOCATION DETAILED: RIGHT DORSAL MIDDLE FINGER METACARPOPHALANGEAL JOINT
LOCATION DETAILED: RIGHT MEDIAL GREAT TOE
LOCATION DETAILED: LEFT LATERAL PLANTAR FOOT
LOCATION DETAILED: RIGHT RADIAL DORSAL HAND
LOCATION DETAILED: LEFT RADIAL DORSAL HAND
LOCATION DETAILED: STERNAL NOTCH
LOCATION DETAILED: RIGHT ANTERIOR DISTAL THIGH
LOCATION DETAILED: RIGHT PROXIMAL PRETIBIAL REGION
LOCATION DETAILED: LEFT MEDIAL DORSAL FOOT
LOCATION DETAILED: LEFT MEDIAL SUPERIOR CHEST
LOCATION DETAILED: LEFT PROXIMAL DORSAL FOREARM
LOCATION DETAILED: LEFT DORSAL GREAT TOE
LOCATION DETAILED: LEFT PROXIMAL DORSAL MIDDLE FINGER
LOCATION DETAILED: RIGHT PROXIMAL DORSAL INDEX FINGER

## 2022-03-21 ASSESSMENT — LOCATION SIMPLE DESCRIPTION DERM
LOCATION SIMPLE: RIGHT INDEX FINGER
LOCATION SIMPLE: LEFT THIGH
LOCATION SIMPLE: LEFT HAND
LOCATION SIMPLE: LEFT FOOT
LOCATION SIMPLE: CHEST
LOCATION SIMPLE: LEFT CLAVICULAR SKIN
LOCATION SIMPLE: RIGHT RING FINGER
LOCATION SIMPLE: RIGHT FOOT
LOCATION SIMPLE: RIGHT MIDDLE FINGER
LOCATION SIMPLE: RIGHT LOWER BACK
LOCATION SIMPLE: LEFT MIDDLE FINGER
LOCATION SIMPLE: LEFT GREAT TOE
LOCATION SIMPLE: RIGHT THUMB
LOCATION SIMPLE: RIGHT GREAT TOE
LOCATION SIMPLE: RIGHT THIGH
LOCATION SIMPLE: LEFT BREAST
LOCATION SIMPLE: LEFT INDEX FINGER
LOCATION SIMPLE: LEFT FOREARM
LOCATION SIMPLE: RIGHT PRETIBIAL REGION
LOCATION SIMPLE: RIGHT SHOULDER
LOCATION SIMPLE: RIGHT HAND

## 2022-03-21 ASSESSMENT — LOCATION ZONE DERM
LOCATION ZONE: LEG
LOCATION ZONE: ARM
LOCATION ZONE: TRUNK
LOCATION ZONE: HAND
LOCATION ZONE: TOE
LOCATION ZONE: FEET
LOCATION ZONE: FINGER

## 2022-03-21 NOTE — PROCEDURE: COUNSELING
Detail Level: Zone
Detail Level: Generalized
Patient Specific Counseling (Will Not Stick From Patient To Patient): ** will monitor both AKs on left foot- left lateral. Advised pt to return in one month if not resolved.

## 2022-03-21 NOTE — PROCEDURE: LIQUID NITROGEN
Consent: The patient's consent was obtained including but not limited to risks of crusting, scabbing, blistering, scarring, darker or lighter pigmentary change, recurrence, incomplete removal and infection.
Detail Level: Detailed
Show Applicator Variable?: Yes
Render Post-Care Instructions In Note?: no
Post-Care Instructions: I reviewed with the patient in detail post-care instructions. Patient is to wear sunprotection, and avoid picking at any of the treated lesions. Pt may apply Vaseline to crusted or scabbing areas.
Duration Of Freeze Thaw-Cycle (Seconds): 0

## 2022-04-16 ENCOUNTER — HEALTH MAINTENANCE LETTER (OUTPATIENT)
Age: 57
End: 2022-04-16

## 2022-05-11 DIAGNOSIS — I10 HYPERTENSION GOAL BP (BLOOD PRESSURE) < 140/90: ICD-10-CM

## 2022-05-11 RX ORDER — CHLORTHALIDONE 25 MG/1
TABLET ORAL
Qty: 90 TABLET | Refills: 1 | OUTPATIENT
Start: 2022-05-11

## 2022-05-12 RX ORDER — CHLORTHALIDONE 25 MG/1
25 TABLET ORAL DAILY
Qty: 90 TABLET | Refills: 0 | Status: SHIPPED | OUTPATIENT
Start: 2022-05-12 | End: 2022-07-21

## 2022-05-12 NOTE — TELEPHONE ENCOUNTER
Patient called trying to get her chlorthalidone refilled stating the pharmacy stated it was denied  Called pharmacy informed refill recently denied as prescription originally sent on 2/9  Pharmacy stated they accidentally closed prescription from 2/9 and needs new prescription sent  Prescription lornaent    Nadine MARIAN, RN

## 2022-05-26 ENCOUNTER — TELEPHONE (OUTPATIENT)
Dept: FAMILY MEDICINE | Facility: CLINIC | Age: 57
End: 2022-05-26
Payer: COMMERCIAL

## 2022-05-26 DIAGNOSIS — I35.1 AORTIC VALVE INSUFFICIENCY, ETIOLOGY OF CARDIAC VALVE DISEASE UNSPECIFIED: Primary | ICD-10-CM

## 2022-05-26 NOTE — TELEPHONE ENCOUNTER
Reason for Call: Request for an order or referral:    Order or referral being requested: Pt has appt at [facility], due to new insurance she needs a referral to be seen. Goes once a year for leaky heart valve    Date needed: as soon as possible    Has the patient been seen by the PCP for this problem? YES    Additional comments: This appt is on 5/31 so this is urgent. When complete, fax to [phone]    Phone number Patient can be reached at:  Cell number on file:    Telephone Information:   Mobile [phone]       Best Time:  Any    Can we leave a detailed message on this number?  YES    Call taken on 5/26/2022 at 2:41 PM by Rm Poole

## 2022-05-27 NOTE — TELEPHONE ENCOUNTER
Patient calling to check status of request. I found a Referral created by Dr. Jiang. Referral printed and faxed to [facility] at [phone] and faxed to patient for her records at [phone].  Patti Thomas

## 2022-06-20 DIAGNOSIS — J45.50 SEVERE PERSISTENT ASTHMA WITHOUT COMPLICATION (H): ICD-10-CM

## 2022-06-20 RX ORDER — BUDESONIDE AND FORMOTEROL FUMARATE DIHYDRATE 160; 4.5 UG/1; UG/1
2 AEROSOL RESPIRATORY (INHALATION) 2 TIMES DAILY
Qty: 10.2 G | Refills: 0 | Status: SHIPPED | OUTPATIENT
Start: 2022-06-20 | End: 2022-07-21

## 2022-06-20 NOTE — TELEPHONE ENCOUNTER
Pending Prescriptions:                       Disp   Refills    budesonide-formoterol (SYMBICORT) 160-4.5*30.6 g 1            Sig: Inhale 2 puffs into the lungs 2 times daily    Routing refill request to provider for review/approval because:  Patient needs to be seen because it has been more than 1 year since last office visit.  LOV with Roscoe on 3/11/21 at [facility].    Sheyla Alba RN

## 2022-06-20 NOTE — TELEPHONE ENCOUNTER
Rx filled x 30 day supply. Patient has not been seen in over 1 year and will need a follow-up visit for additional refills.

## 2022-06-21 ENCOUNTER — MYC MEDICAL ADVICE (OUTPATIENT)
Dept: ALLERGY | Facility: CLINIC | Age: 57
End: 2022-06-21
Payer: COMMERCIAL

## 2022-06-21 NOTE — TELEPHONE ENCOUNTER
Patient calling to get message alert, was unable to login into [employer]. Read message as written. Pt will call to schedule follow up.  Patti Thomas

## 2022-07-12 NOTE — PROGRESS NOTES
Assessment & Plan     Bloating  Food intolerance or other, already had labs ct was next in last provider's workup   Likely will need GI suspect IBS or other  Had xray at last visit already  Is concerned per patient and is asking for ct    - CT Abdomen Pelvis w/o Contrast; Future  - Adult GI  Referral - Consult Only; Future    Abdominal pain, generalized  Same as above  - CT Abdomen Pelvis w/o Contrast; Future  - Adult GI  Referral - Consult Only; Future    Lipoma of skin and subcutaneous tissue  I will message our surgeon and see if he or ENT would see this    Hypertension goal BP (blood pressure) < 140/90  To goal  - amLODIPine (NORVASC) 5 MG tablet; TAKE 1 TABLET(5 MG) BY MOUTH DAILY  - atenolol (TENORMIN) 50 MG tablet; Take 1 tablet (50 mg) by mouth daily  - chlorthalidone (HYGROTON) 25 MG tablet; Take 1 tablet (25 mg) by mouth daily    Severe persistent asthma without complication  improved  - budesonide-formoterol (SYMBICORT) 160-4.5 MCG/ACT Inhaler; Inhale 2 puffs into the lungs 2 times daily    Mild persistent asthma without complication    - albuterol (VENTOLIN HFA) 108 (90 Base) MCG/ACT inhaler; INHALE 2 PUFFS BY MOUTH EVERY 4 HOURS AS NEEDED FOR SHORTNESS OF BREATH OR WHEEZING  Patient Instructions   Call imaging to schedule  [phone]    Schedule with GI            Return in about 6 months (around 1/21/2023) for med recheck, Lab Work.    OBDULIO [facility]   Pam is a 57 year old accompanied by her Self, presenting for the following health issues:  Mass and Health Maintenance      History of Present Illness       Reason for visit:  Lump in neck and ongoing bloated stomach  Symptoms include:  Stated above  Symptom intensity:  Moderate  Symptom progression:  Worsening  Had these symptoms before:  Yes    She eats 2-3 servings of fruits and vegetables daily.She consumes 0 sweetened beverage(s) daily.She exercises with  enough effort to increase her heart rate 30 to 60 minutes per day.  She exercises with enough effort to increase her heart rate 4 days per week.   She is taking medications regularly.      1) abdominal bloating:  Patient saw Dr. Horton for bloating last year. Has had for a long time per patient.  Did labs and xray. Said consider ct if not improving. Patient with FH of pancreatic cancer. Patient has h/o gerd.   Labs normal, mildly elevated liver enzymes in past.   Had colonoscopy last year. Tried miralax. Always feels bloated.   Was on omeprazole previously, does not get heartburn much anymore.     GI consult? never    2)  Lump on neck- supraclavicular area. Has had for years. Has become larger. No pain over the area. Has lipomas on her trunk as well but those do not bother her.       3) med refills:      PCP-Dr. Eason.     Severe persistent Asthma-saw  Dr. Malhotra. On symbicort and ventolin. Seems to be helping a lot.  Symptoms are well controlled. Would like refills.      Obese. Declined weight today.      htn-on medications. No chest pain, shortness of breath, edema, PND, or orthopnea. No dizziness or vision changes. No side effects from medications. Blood pressure has been stable on medication.     H/o abnormal thickening endometrium on US 2014. Had ok endometrial biopsy and subsequent US 2015 that was showed normal endometrial lining. She has had no bleeding.   I noted this in her chart after reviewing it for some time and answering her questions.        Review of Systems   Constitutional, HEENT, cardiovascular, pulmonary, GI, , musculoskeletal, neuro, skin, endocrine and psych systems are negative, except as otherwise noted.      Objective    /82   Pulse 78   Temp 97.9  F (36.6  C) (Tympanic)   Resp 16   SpO2 97%   Breastfeeding No   There is no height or weight on file to calculate BMI.  Physical Exam   GENERAL: alert, no distress and obese  NECK: no adenopathy and thyroid normal to palpation, left  side of neck just above lateral third of clavicle there is a firm soft mass about the size of a small hand that feels consistent to fat. ? Lipoma or just tissue here.  Right side has this as well but not as large.   RESP: lungs clear to auscultation - no rales, rhonchi or wheezes  CV: regular rate and rhythm, normal S1 S2, no S3 or S4, no murmur, click or rub, no peripheral edema and peripheral pulses strong  ABDOMEN: {:soft, nontender  MS: no gross musculoskeletal defects noted, no edema  NEURO: Normal strength and tone, mentation intact and speech normal  PSYCH: mentation appears normal, affect normal/bright                  .  ..

## 2022-07-21 ENCOUNTER — OFFICE VISIT (OUTPATIENT)
Dept: FAMILY MEDICINE | Facility: CLINIC | Age: 57
End: 2022-07-21
Payer: COMMERCIAL

## 2022-07-21 VITALS
DIASTOLIC BLOOD PRESSURE: 82 MMHG | OXYGEN SATURATION: 97 % | TEMPERATURE: 97.9 F | HEART RATE: 78 BPM | SYSTOLIC BLOOD PRESSURE: 134 MMHG | RESPIRATION RATE: 16 BRPM

## 2022-07-21 DIAGNOSIS — I10 HYPERTENSION GOAL BP (BLOOD PRESSURE) < 140/90: ICD-10-CM

## 2022-07-21 DIAGNOSIS — R10.84 ABDOMINAL PAIN, GENERALIZED: ICD-10-CM

## 2022-07-21 DIAGNOSIS — J45.50 SEVERE PERSISTENT ASTHMA WITHOUT COMPLICATION (H): ICD-10-CM

## 2022-07-21 DIAGNOSIS — R14.0 BLOATING: Primary | ICD-10-CM

## 2022-07-21 DIAGNOSIS — J45.30 MILD PERSISTENT ASTHMA WITHOUT COMPLICATION: ICD-10-CM

## 2022-07-21 DIAGNOSIS — D17.30 LIPOMA OF SKIN AND SUBCUTANEOUS TISSUE: ICD-10-CM

## 2022-07-21 PROCEDURE — 99214 OFFICE O/P EST MOD 30 MIN: CPT | Performed by: PHYSICIAN ASSISTANT

## 2022-07-21 RX ORDER — CHLORTHALIDONE 25 MG/1
25 TABLET ORAL DAILY
Qty: 90 TABLET | Refills: 0 | Status: SHIPPED | OUTPATIENT
Start: 2022-07-21 | End: 2022-10-19

## 2022-07-21 RX ORDER — ALBUTEROL SULFATE 90 UG/1
AEROSOL, METERED RESPIRATORY (INHALATION)
Qty: 18 G | Refills: 3 | Status: SHIPPED | OUTPATIENT
Start: 2022-07-21 | End: 2023-05-10

## 2022-07-21 RX ORDER — ATENOLOL 50 MG/1
50 TABLET ORAL DAILY
Qty: 90 TABLET | Refills: 1 | Status: SHIPPED | OUTPATIENT
Start: 2022-07-21 | End: 2023-01-18

## 2022-07-21 RX ORDER — BUDESONIDE AND FORMOTEROL FUMARATE DIHYDRATE 160; 4.5 UG/1; UG/1
2 AEROSOL RESPIRATORY (INHALATION) 2 TIMES DAILY
Qty: 10.2 G | Refills: 11 | Status: SHIPPED | OUTPATIENT
Start: 2022-07-21 | End: 2023-05-10

## 2022-07-21 RX ORDER — AMLODIPINE BESYLATE 5 MG/1
TABLET ORAL
Qty: 90 TABLET | Refills: 1 | Status: SHIPPED | OUTPATIENT
Start: 2022-07-21 | End: 2023-01-18

## 2022-07-21 NOTE — PATIENT INSTRUCTIONS
Call imaging to schedule  [phone]    Schedule with GI       Tied calling to set up apt no voice mail no answer

## 2022-07-22 ENCOUNTER — TELEPHONE (OUTPATIENT)
Dept: FAMILY MEDICINE | Facility: CLINIC | Age: 57
End: 2022-07-22

## 2022-07-22 DIAGNOSIS — D17.30 LIPOMA OF SKIN AND SUBCUTANEOUS TISSUE: Primary | ICD-10-CM

## 2022-07-22 NOTE — TELEPHONE ENCOUNTER
[phone]    Left message on patient's voicemail with Debbie's message and the phone number to set up an appointment.Beba Almaraz MA/KARTHIK

## 2022-07-22 NOTE — TELEPHONE ENCOUNTER
PLEASE CALL PATIENT:      General surgery will see her for what we discussed. Referral placed. Dr. Faust [facility] to [facility].     Debbie Herman PA-C

## 2022-07-29 ENCOUNTER — ANCILLARY PROCEDURE (OUTPATIENT)
Dept: CT IMAGING | Facility: CLINIC | Age: 57
End: 2022-07-29
Attending: PHYSICIAN ASSISTANT
Payer: COMMERCIAL

## 2022-07-29 DIAGNOSIS — R10.84 ABDOMINAL PAIN, GENERALIZED: ICD-10-CM

## 2022-07-29 DIAGNOSIS — R14.0 BLOATING: ICD-10-CM

## 2022-07-29 PROCEDURE — 74176 CT ABD & PELVIS W/O CONTRAST: CPT | Mod: TC | Performed by: RADIOLOGY

## 2022-07-29 NOTE — RESULT ENCOUNTER NOTE
Martha Orozco,       Your recent test results are attached, if you have any questions or concerns please feel free to contact me via e-mail or call [phone].  Nothing on ct to explain your symptoms. Pancreas appears normal.        It was a pleasure to see you at your recent office visit.      Sincerely,  Debbie Herman PA-C

## 2022-08-08 DIAGNOSIS — I10 HYPERTENSION GOAL BP (BLOOD PRESSURE) < 140/90: ICD-10-CM

## 2022-08-08 RX ORDER — CHLORTHALIDONE 25 MG/1
25 TABLET ORAL DAILY
Qty: 90 TABLET | Refills: 0 | OUTPATIENT
Start: 2022-08-08

## 2022-08-08 RX ORDER — AMLODIPINE BESYLATE 5 MG/1
TABLET ORAL
Qty: 90 TABLET | Refills: 1 | OUTPATIENT
Start: 2022-08-08

## 2022-08-08 RX ORDER — ATENOLOL 50 MG/1
50 TABLET ORAL DAILY
Qty: 90 TABLET | Refills: 1 | OUTPATIENT
Start: 2022-08-08

## 2022-09-13 ENCOUNTER — TRANSFERRED RECORDS (OUTPATIENT)
Dept: FAMILY MEDICINE | Facility: CLINIC | Age: 57
End: 2022-09-13

## 2022-10-18 DIAGNOSIS — I10 HYPERTENSION GOAL BP (BLOOD PRESSURE) < 140/90: ICD-10-CM

## 2022-10-19 RX ORDER — CHLORTHALIDONE 25 MG/1
TABLET ORAL
Qty: 90 TABLET | Refills: 0 | Status: SHIPPED | OUTPATIENT
Start: 2022-10-19 | End: 2023-01-18

## 2022-10-22 ENCOUNTER — HEALTH MAINTENANCE LETTER (OUTPATIENT)
Age: 57
End: 2022-10-22

## 2022-11-07 ENCOUNTER — APPOINTMENT (OUTPATIENT)
Dept: URBAN - METROPOLITAN AREA CLINIC 252 | Age: 57
Setting detail: DERMATOLOGY
End: 2022-11-10

## 2022-11-07 VITALS — HEIGHT: 68 IN | WEIGHT: 240 LBS | RESPIRATION RATE: 16 BRPM

## 2022-11-07 DIAGNOSIS — L57.0 ACTINIC KERATOSIS: ICD-10-CM

## 2022-11-07 PROCEDURE — OTHER LIQUID NITROGEN: OTHER

## 2022-11-07 PROCEDURE — OTHER PRESCRIPTION: OTHER

## 2022-11-07 PROCEDURE — 17004 DESTROY PREMAL LESIONS 15/>: CPT

## 2022-11-07 PROCEDURE — OTHER COUNSELING: OTHER

## 2022-11-07 PROCEDURE — OTHER MIPS QUALITY: OTHER

## 2022-11-07 RX ORDER — FLUOROURACIL 2 G/40G
5% CREAM TOPICAL BID
Qty: 40 | Refills: 0 | Status: ERX

## 2022-11-07 RX ORDER — CALCIPOTRIENE 0.05 MG/G
0.005% CREAM TOPICAL BID
Qty: 60 | Refills: 0 | Status: ERX

## 2022-11-07 ASSESSMENT — LOCATION DETAILED DESCRIPTION DERM
LOCATION DETAILED: RIGHT ANKLE
LOCATION DETAILED: LEFT DORSAL INDEX FINGER METACARPOPHALANGEAL JOINT
LOCATION DETAILED: RIGHT LATERAL PLANTAR FOOT
LOCATION DETAILED: LEFT PROXIMAL DORSAL INDEX FINGER
LOCATION DETAILED: RIGHT PROXIMAL DORSAL INDEX FINGER
LOCATION DETAILED: LEFT PROXIMAL DORSAL RING FINGER
LOCATION DETAILED: 3RD WEB SPACE RIGHT HAND
LOCATION DETAILED: RIGHT DORSAL MIDDLE FINGER METACARPOPHALANGEAL JOINT
LOCATION DETAILED: LEFT LATERAL DORSAL FOOT
LOCATION DETAILED: LEFT ULNAR DORSAL HAND
LOCATION DETAILED: RIGHT RADIAL DORSAL HAND
LOCATION DETAILED: LEFT DORSAL THUMB METACARPOPHALANGEAL JOINT
LOCATION DETAILED: LEFT DORSAL RING FINGER METACARPOPHALANGEAL JOINT
LOCATION DETAILED: RIGHT DORSAL INDEX FINGER METACARPOPHALANGEAL JOINT
LOCATION DETAILED: RIGHT MEDIAL DORSAL FOOT
LOCATION DETAILED: RIGHT LATERAL DORSAL FOOT
LOCATION DETAILED: STERNAL NOTCH
LOCATION DETAILED: RIGHT PROXIMAL DORSAL RING FINGER
LOCATION DETAILED: LEFT DORSAL SMALL FINGER METACARPOPHALANGEAL JOINT

## 2022-11-07 ASSESSMENT — LOCATION SIMPLE DESCRIPTION DERM
LOCATION SIMPLE: RIGHT HAND
LOCATION SIMPLE: LEFT INDEX FINGER
LOCATION SIMPLE: RIGHT FOOT
LOCATION SIMPLE: RIGHT RING FINGER
LOCATION SIMPLE: CHEST
LOCATION SIMPLE: RIGHT INDEX FINGER
LOCATION SIMPLE: LEFT RING FINGER
LOCATION SIMPLE: RIGHT ANKLE
LOCATION SIMPLE: LEFT HAND
LOCATION SIMPLE: LEFT FOOT

## 2022-11-07 ASSESSMENT — LOCATION ZONE DERM
LOCATION ZONE: FEET
LOCATION ZONE: FINGER
LOCATION ZONE: HAND
LOCATION ZONE: LEG
LOCATION ZONE: TRUNK

## 2022-11-07 NOTE — PROCEDURE: LIQUID NITROGEN
Show Applicator Variable?: Yes
Render Post-Care Instructions In Note?: no
Duration Of Freeze Thaw-Cycle (Seconds): 0
Post-Care Instructions: I reviewed with the patient in detail post-care instructions. Patient is to wear sunprotection, and avoid picking at any of the treated lesions. Pt may apply Vaseline to crusted or scabbing areas.
Consent: The patient's consent was obtained including but not limited to risks of crusting, scabbing, blistering, scarring, darker or lighter pigmentary change, recurrence, incomplete removal and infection.
Post-Care Instructions: - Patient was instructed to avoid picking at any of the treated lesions.
Detail Level: Detailed
Consent: - Discussed that these are a result of cumulative sun exposure.\\n- Verbal and written consent was obtained, and risks were reviewed prior to procedure today. \\n- Risks discussed include but are not limited to pain, crusting, scabbing, blistering, scarring, temporary or permanent darker or lighter pigmentary change, recurrence, incomplete resolution, and infection.

## 2022-11-11 ENCOUNTER — TRANSFERRED RECORDS (OUTPATIENT)
Dept: HEALTH INFORMATION MANAGEMENT | Facility: CLINIC | Age: 57
End: 2022-11-11

## 2022-11-11 LAB — TSH SERPL-ACNC: 2.31 UIU/ML (ref 0.45–4.5)

## 2022-11-15 ENCOUNTER — VIRTUAL VISIT (OUTPATIENT)
Dept: FAMILY MEDICINE | Facility: CLINIC | Age: 57
End: 2022-11-15
Payer: COMMERCIAL

## 2022-11-15 DIAGNOSIS — J45.41 MODERATE PERSISTENT ASTHMA WITH EXACERBATION: Primary | ICD-10-CM

## 2022-11-15 PROCEDURE — 99213 OFFICE O/P EST LOW 20 MIN: CPT | Mod: TEL | Performed by: NURSE PRACTITIONER

## 2022-11-15 RX ORDER — AZITHROMYCIN 250 MG/1
TABLET, FILM COATED ORAL
Qty: 6 TABLET | Refills: 0 | Status: SHIPPED | OUTPATIENT
Start: 2022-11-15 | End: 2022-11-20

## 2022-11-15 RX ORDER — PREDNISONE 20 MG/1
40 TABLET ORAL DAILY
Qty: 10 TABLET | Refills: 0 | Status: SHIPPED | OUTPATIENT
Start: 2022-11-15 | End: 2022-11-20

## 2022-11-15 NOTE — PROGRESS NOTES
Pam is a 57 year old who is being evaluated via a billable telephone visit.      What phone number would you like to be contacted at? [phone]  How would you like to obtain your AVS? MyChart    Assessment & Plan     Moderate persistent asthma with exacerbation  - predniSONE (DELTASONE) 20 MG tablet  Dispense: 10 tablet; Refill: 0  - azithromycin (ZITHROMAX) 250 MG tablet  Dispense: 6 tablet; Refill: 0      Prescription drug management         See Patient Instructions    No follow-ups on file.    Debbie Ashley [facility]   Pam is a 57 year old, presenting for the following health issues:  Cough      HPI     Acute Illness  Acute illness concerns: Cough  Onset/Duration: 1 day  Symptoms:  Fever: No  Chills/Sweats: chills  Headache (location?): YES  Sinus Pressure: YES  Conjunctivitis:  No  Ear Pain: no  Rhinorrhea: YES  Congestion: YES  Sore Throat: No  Cough: YES-productive of yellow sputum, productive of green sputum  Wheeze: YES patient has asthma   Decreased Appetite: No  Nausea: No  Vomiting: No  Diarrhea: No  Dysuria/Freq.: No  Dysuria or Hematuria: No  Fatigue/Achiness: YES  Sick/Strep Exposure: No  Therapies tried and outcome: inhaler- cough drops    Symptoms started yesterday. Cough is productive. Yellow green mucus. Chest feels tight and wheezy. No temp. She did have COVID test and Influenza A.     Review of Systems   Constitutional, HEENT, cardiovascular, pulmonary, GI, , musculoskeletal, neuro, skin, endocrine and psych systems are negative, except as otherwise noted.      Objective           Vitals:  No vitals were obtained today due to virtual visit.    Physical Exam   healthy, alert and no distress  PSYCH: Alert and oriented times 3; coherent speech, normal   rate and volume, able to articulate logical thoughts, able   to abstract reason, no tangential thoughts, no hallucinations   or delusions  Her affect is normal  RESP: No cough, no audible  wheezing, able to talk in full sentences  Remainder of exam unable to be completed due to telephone visits              JANE Hawthorne     [address] [address]  [email or web address]  [email or web address]   Office: [phone]                 Phone call duration: 7 minutes

## 2022-11-18 ENCOUNTER — TELEPHONE (OUTPATIENT)
Dept: FAMILY MEDICINE | Facility: CLINIC | Age: 57
End: 2022-11-18

## 2022-11-18 DIAGNOSIS — R05.1 ACUTE COUGH: Primary | ICD-10-CM

## 2022-11-18 RX ORDER — BENZONATATE 200 MG/1
200 CAPSULE ORAL 3 TIMES DAILY PRN
Qty: 30 CAPSULE | Refills: 0 | Status: SHIPPED | OUTPATIENT
Start: 2022-11-18 | End: 2024-01-04

## 2022-11-18 RX ORDER — DEXTROMETHORPHAN HYDROBROMIDE AND PROMETHAZINE HYDROCHLORIDE 15; 6.25 MG/5ML; MG/5ML
5 SYRUP ORAL EVERY 4 HOURS PRN
Qty: 118 ML | Refills: 0 | Status: SHIPPED | OUTPATIENT
Start: 2022-11-18 | End: 2024-01-04

## 2022-11-18 NOTE — TELEPHONE ENCOUNTER
Attempt # 1    Called # [phone]     Left a non detailed VM to call back at [phone] and ask for any available Triage Nurse.    SEMAJ MOY RN on 11/18/2022 at 3:02 PM   [facility]

## 2022-11-18 NOTE — TELEPHONE ENCOUNTER
"Patient is calling requesting prescription for a cough suppressant medication.    \"OTC does not work, I have tried everything.\"  Patient had cough since Monday and at the time of her virtual visit on Tuesday.  She is taking z-pack and prednisone.  Patient is constantly coughing.  It is productive and dry.  Cannot rest or sleep; Slept 2 hours last night.  The body hurts from coughing.      Routing to the provider patient saw and PCP    Val Fang RN  [facility]    "

## 2022-11-18 NOTE — TELEPHONE ENCOUNTER
Sent cough medication to pharmacy. Should be seen Monday in person if continues.    Debbie Ashley, CNP

## 2022-11-21 ENCOUNTER — OFFICE VISIT (OUTPATIENT)
Dept: URGENT CARE | Facility: URGENT CARE | Age: 57
End: 2022-11-21
Payer: COMMERCIAL

## 2022-11-21 ENCOUNTER — ANCILLARY PROCEDURE (OUTPATIENT)
Dept: GENERAL RADIOLOGY | Facility: CLINIC | Age: 57
End: 2022-11-21
Attending: PHYSICIAN ASSISTANT
Payer: COMMERCIAL

## 2022-11-21 ENCOUNTER — NURSE TRIAGE (OUTPATIENT)
Dept: NURSING | Facility: CLINIC | Age: 57
End: 2022-11-21

## 2022-11-21 VITALS
BODY MASS INDEX: 37.19 KG/M2 | TEMPERATURE: 99 F | HEART RATE: 69 BPM | DIASTOLIC BLOOD PRESSURE: 90 MMHG | OXYGEN SATURATION: 96 % | WEIGHT: 244.6 LBS | SYSTOLIC BLOOD PRESSURE: 138 MMHG

## 2022-11-21 DIAGNOSIS — R05.1 ACUTE COUGH: Primary | ICD-10-CM

## 2022-11-21 DIAGNOSIS — J45.21 MILD INTERMITTENT ASTHMA WITH EXACERBATION: ICD-10-CM

## 2022-11-21 LAB
BASOPHILS # BLD AUTO: 0 10E3/UL (ref 0–0.2)
BASOPHILS NFR BLD AUTO: 0 %
EOSINOPHIL # BLD AUTO: 0 10E3/UL (ref 0–0.7)
EOSINOPHIL NFR BLD AUTO: 0 %
ERYTHROCYTE [DISTWIDTH] IN BLOOD BY AUTOMATED COUNT: 12.9 % (ref 10–15)
HCT VFR BLD AUTO: 47.9 % (ref 35–47)
HGB BLD-MCNC: 16.7 G/DL (ref 11.7–15.7)
LYMPHOCYTES # BLD AUTO: 0.8 10E3/UL (ref 0.8–5.3)
LYMPHOCYTES NFR BLD AUTO: 11 %
MCH RBC QN AUTO: 33 PG (ref 26.5–33)
MCHC RBC AUTO-ENTMCNC: 34.9 G/DL (ref 31.5–36.5)
MCV RBC AUTO: 95 FL (ref 78–100)
MONOCYTES # BLD AUTO: 0.3 10E3/UL (ref 0–1.3)
MONOCYTES NFR BLD AUTO: 4 %
NEUTROPHILS # BLD AUTO: 6.2 10E3/UL (ref 1.6–8.3)
NEUTROPHILS NFR BLD AUTO: 85 %
PLATELET # BLD AUTO: 246 10E3/UL (ref 150–450)
RBC # BLD AUTO: 5.06 10E6/UL (ref 3.8–5.2)
WBC # BLD AUTO: 7.3 10E3/UL (ref 4–11)

## 2022-11-21 PROCEDURE — 36415 COLL VENOUS BLD VENIPUNCTURE: CPT | Performed by: PHYSICIAN ASSISTANT

## 2022-11-21 PROCEDURE — 85025 COMPLETE CBC W/AUTO DIFF WBC: CPT | Performed by: PHYSICIAN ASSISTANT

## 2022-11-21 PROCEDURE — 71046 X-RAY EXAM CHEST 2 VIEWS: CPT | Mod: TC | Performed by: RADIOLOGY

## 2022-11-21 PROCEDURE — 99214 OFFICE O/P EST MOD 30 MIN: CPT | Mod: 25 | Performed by: PHYSICIAN ASSISTANT

## 2022-11-21 PROCEDURE — 94640 AIRWAY INHALATION TREATMENT: CPT | Performed by: PHYSICIAN ASSISTANT

## 2022-11-21 RX ORDER — IPRATROPIUM BROMIDE AND ALBUTEROL SULFATE 2.5; .5 MG/3ML; MG/3ML
3 SOLUTION RESPIRATORY (INHALATION) ONCE
Status: COMPLETED | OUTPATIENT
Start: 2022-11-21 | End: 2022-11-21

## 2022-11-21 RX ORDER — IPRATROPIUM BROMIDE AND ALBUTEROL SULFATE 2.5; .5 MG/3ML; MG/3ML
1 SOLUTION RESPIRATORY (INHALATION) EVERY 6 HOURS PRN
Qty: 90 ML | Refills: 0 | Status: SHIPPED | OUTPATIENT
Start: 2022-11-21 | End: 2024-01-04

## 2022-11-21 RX ADMIN — IPRATROPIUM BROMIDE AND ALBUTEROL SULFATE 3 ML: 2.5; .5 SOLUTION RESPIRATORY (INHALATION) at 16:47

## 2022-11-21 ASSESSMENT — ENCOUNTER SYMPTOMS
SHORTNESS OF BREATH: 1
RHINORRHEA: 1
FEVER: 0
HEADACHES: 0
WHEEZING: 0
SORE THROAT: 0
APPETITE CHANGE: 1
COUGH: 1

## 2022-11-21 ASSESSMENT — PAIN SCALES - GENERAL: PAINLEVEL: NO PAIN (0)

## 2022-11-21 NOTE — NURSING NOTE
Clinic Administered Medication Documentation    Administrations This Visit     ipratropium - albuterol 0.5 mg/2.5 mg/3 mL (DUONEB) neb solution 3 mL     Admin Date  11/21/2022 Action  Given Dose  3 mL Route  Nebulization Site   Administered By  Robyn Tay MA    Ordering Provider: Tracy Aviles    NDC: 77985-993-40    Lot#: [id number]    : [employer]    Patient Supplied?: No                Inhalable/Nebs Medication Documentation    Patient was given Ipratropium-Albuterol Neb. Prior to medication administration, verified patients identity using patient s name and date of birth. Please see MAR and medication order for additional information.     Expiration Date:  11/1/23

## 2022-11-21 NOTE — PROGRESS NOTES
SUBJECTIVE:   Pam Mora is a 57 year old female presenting with a chief complaint of   Chief Complaint   Patient presents with     Cough     Shortness of Breath       She is an established patient of [facility].  Patient presents after being on zpak and prednisone on 11/15 via televisit.  No improvement.  Noted SOB today.  Patient states has had symptoms since 11/14.  Patient adjusted her prednisone (even though she was prescribed 20 mg BID x 5 days).  Influenza and covid negative on 11/14.  No hospitalization or intubation for asthma.  Also on tessalon perles - nothing helped.  Is also on symbicort.  No nebulizer at home.      Treatment:  Prednisone, zpak, albuterol inhaler          Review of Systems   Constitutional: Positive for appetite change. Negative for fever.   HENT: Positive for congestion and rhinorrhea. Negative for ear pain and sore throat.    Respiratory: Positive for cough and shortness of breath. Negative for wheezing.    Neurological: Negative for headaches.   All other systems reviewed and are negative.      Past Medical History:   Diagnosis Date     AR (allergic rhinitis)      Asthma     Persistant, mild     Elevated fasting glucose      GERD (gastroesophageal reflux disease)      High cholesterol      HTN (hypertension)      Incidental lung nodule 05/07     LSIL (low grade squamous intraepithelial lesion) on Pap smear      Migraine      Obesity (BMI 30-39.9)      Family History   Problem Relation Age of Onset     Neurologic Disorder Maternal Grandfather         parkinsons     Diabetes Paternal Grandfather      Current Outpatient Medications   Medication Sig Dispense Refill     albuterol (VENTOLIN HFA) 108 (90 Base) MCG/ACT inhaler INHALE 2 PUFFS BY MOUTH EVERY 4 HOURS AS NEEDED FOR SHORTNESS OF BREATH OR WHEEZING 18 g 3     amLODIPine (NORVASC) 5 MG tablet TAKE 1 TABLET(5 MG) BY MOUTH DAILY 90 tablet 1     atenolol (TENORMIN) 50 MG tablet Take 1 tablet (50 mg) by mouth daily 90 tablet 1      benzonatate (TESSALON) 200 MG capsule Take 1 capsule (200 mg) by mouth 3 times daily as needed for cough 30 capsule 0     budesonide-formoterol (SYMBICORT) 160-4.5 MCG/ACT Inhaler Inhale 2 puffs into the lungs 2 times daily 10.2 g 11     chlorthalidone (HYGROTON) 25 MG tablet TAKE 1 TABLET(25 MG) BY MOUTH DAILY 90 tablet 0     ipratropium - albuterol 0.5 mg/2.5 mg/3 mL (DUONEB) 0.5-2.5 (3) MG/3ML neb solution Take 1 vial (3 mLs) by nebulization every 6 hours as needed for shortness of breath / dyspnea or wheezing 90 mL 0     MULTI-VITAMIN OR TABS 1 TABLET DAILY       polyethylene glycol (MIRALAX) 17 GM/Dose powder Take 17 g (1 capful) by mouth daily 507 g 1     promethazine-DM (PHENERGAN-DM) 6.25-15 MG/5ML syrup Take 5 mLs by mouth every 4 hours as needed for cough 118 mL 0     Social History     Tobacco Use     Smoking status: Former     Packs/day: 0.00     Years: 0.00     Pack years: 0.00     Types: Cigarettes     Quit date: 2008     Years since quittin.8     Smokeless tobacco: Never   Substance Use Topics     Alcohol use: Yes     Comment: 3 drinks a week       OBJECTIVE  BP (!) 138/90   Pulse 69   Temp 99  F (37.2  C) (Tympanic)   Wt 110.9 kg (244 lb 9.6 oz)   SpO2 96%   BMI 37.19 kg/m      Physical Exam  Vitals and nursing note reviewed.   Constitutional:       Appearance: Normal appearance. She is obese.      Comments: Patient able to speak in complete sentences and does not appear SOB.     HENT:      Head: Normocephalic and atraumatic.      Right Ear: Tympanic membrane, ear canal and external ear normal.      Left Ear: Tympanic membrane, ear canal and external ear normal.      Nose: Nose normal.      Mouth/Throat:      Mouth: Mucous membranes are moist.      Pharynx: Oropharynx is clear.   Eyes:      Extraocular Movements: Extraocular movements intact.      Conjunctiva/sclera: Conjunctivae normal.   Cardiovascular:      Rate and Rhythm: Normal rate and regular rhythm.      Pulses: Normal  pulses.      Heart sounds: Normal heart sounds.   Pulmonary:      Effort: Pulmonary effort is normal.      Breath sounds: Wheezing and rhonchi present.   Musculoskeletal:      Cervical back: Normal range of motion.   Skin:     General: Skin is warm and dry.      Findings: No rash.   Neurological:      General: No focal deficit present.      Mental Status: She is alert.   Psychiatric:         Mood and Affect: Mood normal.         Behavior: Behavior normal.         Labs:  Results for orders placed or performed in visit on 11/21/22 (from the past 24 hour(s))   CBC with platelets and differential    Narrative    The following orders were created for panel order CBC with platelets and differential.  Procedure                               Abnormality         Status                     ---------                               -----------         ------                     CBC with platelets and d...[[id number]]  Abnormal            Final result                 Please view results for these tests on the individual orders.   CBC with platelets and differential   Result Value Ref Range    WBC Count 7.3 4.0 - 11.0 10e3/uL    RBC Count 5.06 3.80 - 5.20 10e6/uL    Hemoglobin 16.7 (H) 11.7 - 15.7 g/dL    Hematocrit 47.9 (H) 35.0 - 47.0 %    MCV 95 78 - 100 fL    MCH 33.0 26.5 - 33.0 pg    MCHC 34.9 31.5 - 36.5 g/dL    RDW 12.9 10.0 - 15.0 %    Platelet Count 246 150 - 450 10e3/uL    % Neutrophils 85 %    % Lymphocytes 11 %    % Monocytes 4 %    % Eosinophils 0 %    % Basophils 0 %    Absolute Neutrophils 6.2 1.6 - 8.3 10e3/uL    Absolute Lymphocytes 0.8 0.8 - 5.3 10e3/uL    Absolute Monocytes 0.3 0.0 - 1.3 10e3/uL    Absolute Eosinophils 0.0 0.0 - 0.7 10e3/uL    Absolute Basophils 0.0 0.0 - 0.2 10e3/uL   XR Chest 2 Views    Narrative    XR CHEST 2 VIEWS  11/21/2022 4:28 PM       INDICATION: Acute cough  COMPARISON: 1/7/2022       Impression    IMPRESSION: Negative chest.    MARIO FITZGERALD MD         SYSTEM ID:  [id number]        X-Ray was done, my findings are: NAD    ASSESSMENT:      ICD-10-CM    1. Acute cough  R05.1 XR Chest 2 Views     CBC with platelets and differential     CBC with platelets and differential     ipratropium - albuterol 0.5 mg/2.5 mg/3 mL (DUONEB) neb solution 3 mL     ipratropium - albuterol 0.5 mg/2.5 mg/3 mL (DUONEB) 0.5-2.5 (3) MG/3ML neb solution     CANCELED: D dimer, quantitative      2. Mild intermittent asthma with exacerbation  J45.21 Nebulizer and Supplies Order for DME - ONLY FOR DME     ipratropium - albuterol 0.5 mg/2.5 mg/3 mL (DUONEB) 0.5-2.5 (3) MG/3ML neb solution           Medical Decision Making:    Differential Diagnosis:  URI Adult/Peds:  Asthma exacerbation, Bronchitis-viral, Influenza and Pneumonia    Serious Comorbid Conditions:  Adult:  Asthma and reviewed    PLAN:  Patient given duo neb treatment here.  Patient reports feeling better.  Recommended patient finish out prednisone as prescribed.  Patient given nebulizer to take home with rx for duonebs.  Patient education.  Discussed reasons to seek immediate medical attention.        Followup:    If not improving or if condition worsens, follow up with your Primary Care Provider, If not improving or if conditions worsens over the next 12-24 hours, go to the Emergency Department    There are no Patient Instructions on file for this visit.

## 2022-11-21 NOTE — TELEPHONE ENCOUNTER
Nurse Triage SBAR    Is this a 2nd Level Triage? NO    Situation: Virtual visit last Tuesday. Was supposed to schedule in person visit if not feeling better. Now feeling worse.    Background: Patient,Pam, bong. Consent: not needed.    Assessment:  Cough has worsened since then and she is having shortness of breath, chest tightness. Has a h/o asthma. Using albuterol inhaler with no help. Possible wheezing on exhalation. Crackles in lungs per patient. Heaviness in her chest.  COVID and Influenza A/B all negative. Given Z-johanny and oral prednisone x 4 days. She is taking less of the prednisone than prescribed due to side effects.  Denies fever.  Oxygen sats 93-95%, but can dip down to 90% at the lowest.  Needing her albuterol inhaler every 2 hrs with minimal relief.  Feeling so much worse today, and symptoms have lasted for over 1 week.     Protocol Recommended Disposition: Go to Office now.    Recommendation: According to the protocol, Patient should go to the office now. Advised Patient to go to Urgent Care now as there are no appointments available in office and she should be seen in office. Care advice given. Patient verbalizes understanding and agrees with plan of care. Reviewed concerning symptoms and when to call back and patient verbalized understanding and agreed to follow care advice given.       Alejandra Ramirez RN  [facility] Nurse Advisor  11/21/2022 at 12:11 PM               Reason for Disposition    MILD difficulty breathing (e.g., minimal/no SOB at rest, SOB with walking, pulse < 100) of new-onset or worse than normal    Additional Information    Negative: SEVERE difficulty breathing (e.g., struggling for each breath, speaks in single words, pulse > 120)    Negative: Breathing stopped and hasn't returned    Negative: Choking on something    Negative: Bluish (or gray) lips or face    Negative: Difficult to awaken or acting confused (e.g., disoriented, slurred speech)    Negative: Passed out  "(i.e., fainted, collapsed and was not responding)    Negative: Wheezing started suddenly after medicine, an allergic food, or bee sting    Negative: Stridor    Negative: Slow, shallow and weak breathing    Negative: Sounds like a life-threatening emergency to the triager    Negative: Chest pain    Negative: Wheezing (high pitched whistling sound) and previous asthma attacks or use of asthma medicines    Negative: Difficulty breathing and within 14 days of COVID-19 Exposure    Negative: Difficulty breathing and only present when coughing    Negative: Difficulty breathing and only from stuffy nose    Negative: Difficulty breathing and only from stuffy nose or runny nose from common cold    Negative: MODERATE difficulty breathing (e.g., speaks in phrases, SOB even at rest, pulse 100-120) of new-onset or worse than normal    Negative: Oxygen level (e.g., pulse oximetry) 90 percent or lower    Negative: Wheezing can be heard across the room    Negative: Drooling or spitting out saliva (because can't swallow)    Negative: Any history of prior \"blood clot\" in leg or lungs    Negative: Illness requiring prolonged bedrest in past month (e.g., immobilization, long hospital stay)    Negative: Hip or leg fracture (broken bone) in past month (or had cast on leg or ankle in past month)    Negative: Major surgery in the past month    Negative: Long-distance travel in past month (e.g., car, bus, train, plane; with trip lasting 6 or more hours)    Negative: Cancer treatment in past six months (or has cancer now)    Negative: Extra heart beats OR irregular heart beating (i.e., \"palpitations\")    Negative: Fever > 103 F (39.4 C)    Negative: Fever > 101 F (38.3 C) and over 60 years of age    Negative: Fever > 100.0 F (37.8 C) and bedridden (e.g., nursing home patient, stroke, chronic illness, recovering from surgery)    Negative: Fever > 100.0 F (37.8 C) and diabetes mellitus or weak immune system (e.g., HIV positive, cancer chemo, " splenectomy, organ transplant, chronic steroids)    Negative: Periods where breathing stops and then resumes normally and bedridden (e.g., nursing home patient, CVA)    Negative: Pregnant or postpartum (from 0 to 6 weeks after delivery)    Negative: Patient sounds very sick or weak to the triager    Protocols used: BREATHING DIFFICULTY-A-OH

## 2022-11-28 DIAGNOSIS — R05.1 ACUTE COUGH: ICD-10-CM

## 2022-11-28 DIAGNOSIS — J45.21 MILD INTERMITTENT ASTHMA WITH EXACERBATION: ICD-10-CM

## 2022-11-28 RX ORDER — IPRATROPIUM BROMIDE AND ALBUTEROL SULFATE 2.5; .5 MG/3ML; MG/3ML
1 SOLUTION RESPIRATORY (INHALATION) EVERY 6 HOURS PRN
Qty: 90 ML | Refills: 0 | OUTPATIENT
Start: 2022-11-28

## 2023-01-18 DIAGNOSIS — I10 HYPERTENSION GOAL BP (BLOOD PRESSURE) < 140/90: ICD-10-CM

## 2023-01-18 RX ORDER — ATENOLOL 50 MG/1
TABLET ORAL
Qty: 90 TABLET | Refills: 1 | Status: SHIPPED | OUTPATIENT
Start: 2023-01-18 | End: 2023-05-10

## 2023-01-18 RX ORDER — CHLORTHALIDONE 25 MG/1
TABLET ORAL
Qty: 90 TABLET | Refills: 0 | Status: SHIPPED | OUTPATIENT
Start: 2023-01-18 | End: 2023-04-17

## 2023-01-18 RX ORDER — AMLODIPINE BESYLATE 5 MG/1
TABLET ORAL
Qty: 90 TABLET | Refills: 1 | Status: SHIPPED | OUTPATIENT
Start: 2023-01-18 | End: 2023-05-10

## 2023-03-06 ENCOUNTER — MYC MEDICAL ADVICE (OUTPATIENT)
Dept: FAMILY MEDICINE | Facility: CLINIC | Age: 58
End: 2023-03-06

## 2023-03-06 ENCOUNTER — TELEPHONE (OUTPATIENT)
Dept: FAMILY MEDICINE | Facility: CLINIC | Age: 58
End: 2023-03-06

## 2023-03-06 DIAGNOSIS — I34.0 MITRAL VALVE INSUFFICIENCY, UNSPECIFIED ETIOLOGY: Primary | ICD-10-CM

## 2023-03-06 DIAGNOSIS — Z12.83 SKIN EXAM, SCREENING FOR CANCER: Primary | ICD-10-CM

## 2023-03-06 NOTE — TELEPHONE ENCOUNTER
Pt called back.    States that she usually get an echo done every 3 years. States that she sees [facility] for a leaky valve.    States that her appointment is next Wednesday and needs the referral placed.   State that she is normally able to get this done without issues.     Routing to PCP.      Maria D Worrell RN  [facility]

## 2023-03-06 NOTE — TELEPHONE ENCOUNTER
Order/Referral Request    Who is requesting: [facility]    Orders being requested: echo cardio gram    Reason service is needed/diagnosis: in network with referal    When are orders needed by: asap    Has this been discussed with Provider: Yes    Does patient have a preference on a Group/Provider/Facility? [facility]    Does patient have an appointment scheduled?: Yes: 3/15    Where to send orders: Fax    Could we send this information to you in [email or web address] or would you prefer to receive a phone call?:   Patient would prefer a phone call   Okay to leave a detailed message?: Yes at Cell number on file:    Telephone Information:   Mobile [phone]     Please fax.  Sommer Thomas,

## 2023-03-06 NOTE — TELEPHONE ENCOUNTER
Left a message to return a call to [phone].  Claudia Clemens R.N.    RN:  Whomever speaks with Pam please ask what the request below is all about. She no showed a physical appointment today with Dr. Cesar and has not seen a primary provider about cardiology issues recently. She may need to be seen for this request.  Thank you. Claudia Clemens R.N.       Birth Control Pills Counseling: Birth Control Pill Counseling: I discussed with the patient the potential side effects of OCPs including but not limited to increased risk of stroke, heart attack, thrombophlebitis, deep venous thrombosis, hepatic adenomas, breast changes, GI upset, headaches, and depression.  The patient verbalized understanding of the proper use and possible adverse effects of OCPs. All of the patient's questions and concerns were addressed.

## 2023-03-07 NOTE — TELEPHONE ENCOUNTER
I placed the referral. Please fax to [facility].    Thank you.    Franki Eason M.D.

## 2023-03-07 NOTE — TELEPHONE ENCOUNTER
Faxed referral to [facility] ([facility] @ [phone].Beba Almaraz MA/KARTHIK

## 2023-03-13 ENCOUNTER — TELEPHONE (OUTPATIENT)
Dept: FAMILY MEDICINE | Facility: CLINIC | Age: 58
End: 2023-03-13

## 2023-03-13 NOTE — TELEPHONE ENCOUNTER
Order/Referral Request    Who is requesting: [facility]    Orders being requested: echo referral    Reason service is needed/diagnosis: 135.1    When are orders needed by: asap    Has this been discussed with Provider: Yes    Does patient have a preference on a Group/Provider/Facility? [facility]  [phone]    Does patient have an appointment scheduled?: Yes: 3/15/23    Where to send orders: Fax    Could we send this information to you in [facility] or would you prefer to receive a phone call?:   No preference   Okay to leave a detailed message?: Yes at Cell number on file:    Telephone Information:   Mobile [phone]     Sommer Thomas,

## 2023-04-13 NOTE — TELEPHONE ENCOUNTER
Hi faustin the order was faxed to [facility] on 3-7-23.  However, they are out of network with patient's care system.    Maria D  [facility] Referrals  [facility]

## 2023-04-17 DIAGNOSIS — I10 HYPERTENSION GOAL BP (BLOOD PRESSURE) < 140/90: ICD-10-CM

## 2023-04-17 RX ORDER — CHLORTHALIDONE 25 MG/1
TABLET ORAL
Qty: 90 TABLET | Refills: 0 | Status: SHIPPED | OUTPATIENT
Start: 2023-04-17 | End: 2023-05-10

## 2023-04-21 NOTE — PROGRESS NOTES
Patient Quality Outreach    Patient is due for the following:   There are no preventive care reminders to display for this patient.    Next Steps:   Patient has upcoming appointment, these items will be addressed at that time.    Type of outreach:    Chart review performed, no outreach needed.    Next Steps:  Reach out within 90 days via none.    Max number of attempts reached: No. Will try again in 90 days if patient still on fail list.    Questions for provider review:    None     Анна Jj MA

## 2023-05-10 ENCOUNTER — OFFICE VISIT (OUTPATIENT)
Dept: FAMILY MEDICINE | Facility: CLINIC | Age: 58
End: 2023-05-10
Payer: COMMERCIAL

## 2023-05-10 VITALS
HEIGHT: 68 IN | OXYGEN SATURATION: 94 % | TEMPERATURE: 98.9 F | SYSTOLIC BLOOD PRESSURE: 134 MMHG | BODY MASS INDEX: 37.44 KG/M2 | HEART RATE: 86 BPM | WEIGHT: 247 LBS | DIASTOLIC BLOOD PRESSURE: 86 MMHG

## 2023-05-10 DIAGNOSIS — Z00.00 ROUTINE GENERAL MEDICAL EXAMINATION AT A HEALTH CARE FACILITY: Primary | ICD-10-CM

## 2023-05-10 DIAGNOSIS — E78.5 DYSLIPIDEMIA: ICD-10-CM

## 2023-05-10 DIAGNOSIS — Z12.4 SCREENING FOR CERVICAL CANCER: ICD-10-CM

## 2023-05-10 DIAGNOSIS — R73.03 PREDIABETES: ICD-10-CM

## 2023-05-10 DIAGNOSIS — E66.01 MORBID OBESITY (H): ICD-10-CM

## 2023-05-10 DIAGNOSIS — I10 HYPERTENSION GOAL BP (BLOOD PRESSURE) < 140/90: ICD-10-CM

## 2023-05-10 DIAGNOSIS — R74.8 ELEVATED LIVER ENZYMES: ICD-10-CM

## 2023-05-10 DIAGNOSIS — L08.9 SKIN INFECTION: ICD-10-CM

## 2023-05-10 DIAGNOSIS — J45.50 SEVERE PERSISTENT ASTHMA WITHOUT COMPLICATION (H): ICD-10-CM

## 2023-05-10 DIAGNOSIS — L98.9 SKIN LESION: ICD-10-CM

## 2023-05-10 LAB
ALBUMIN SERPL-MCNC: 3.9 G/DL (ref 3.4–5)
ALP SERPL-CCNC: 87 U/L (ref 40–150)
ALT SERPL W P-5'-P-CCNC: 78 U/L (ref 0–50)
ANION GAP SERPL CALCULATED.3IONS-SCNC: 7 MMOL/L (ref 3–14)
AST SERPL W P-5'-P-CCNC: 56 U/L (ref 0–45)
BILIRUB SERPL-MCNC: 1.3 MG/DL (ref 0.2–1.3)
BUN SERPL-MCNC: 14 MG/DL (ref 7–30)
CALCIUM SERPL-MCNC: 9.3 MG/DL (ref 8.5–10.1)
CHLORIDE BLD-SCNC: 98 MMOL/L (ref 94–109)
CHOLEST SERPL-MCNC: 296 MG/DL
CO2 SERPL-SCNC: 32 MMOL/L (ref 20–32)
CREAT SERPL-MCNC: 0.74 MG/DL (ref 0.52–1.04)
FASTING STATUS PATIENT QL REPORTED: YES
GFR SERPL CREATININE-BSD FRML MDRD: >90 ML/MIN/1.73M2
GLUCOSE BLD-MCNC: 147 MG/DL (ref 70–99)
HBA1C MFR BLD: 6.1 % (ref 0–5.6)
HDLC SERPL-MCNC: 81 MG/DL
LDLC SERPL CALC-MCNC: 191 MG/DL
NONHDLC SERPL-MCNC: 215 MG/DL
POTASSIUM BLD-SCNC: 3.8 MMOL/L (ref 3.4–5.3)
PROT SERPL-MCNC: 8.1 G/DL (ref 6.8–8.8)
SODIUM SERPL-SCNC: 137 MMOL/L (ref 133–144)
TRIGL SERPL-MCNC: 122 MG/DL

## 2023-05-10 PROCEDURE — G0145 SCR C/V CYTO,THINLAYER,RESCR: HCPCS | Performed by: NURSE PRACTITIONER

## 2023-05-10 PROCEDURE — 36415 COLL VENOUS BLD VENIPUNCTURE: CPT | Performed by: NURSE PRACTITIONER

## 2023-05-10 PROCEDURE — 83036 HEMOGLOBIN GLYCOSYLATED A1C: CPT | Performed by: NURSE PRACTITIONER

## 2023-05-10 PROCEDURE — 80053 COMPREHEN METABOLIC PANEL: CPT | Performed by: NURSE PRACTITIONER

## 2023-05-10 PROCEDURE — 87624 HPV HI-RISK TYP POOLED RSLT: CPT | Performed by: NURSE PRACTITIONER

## 2023-05-10 PROCEDURE — 99214 OFFICE O/P EST MOD 30 MIN: CPT | Mod: 25 | Performed by: NURSE PRACTITIONER

## 2023-05-10 PROCEDURE — 99396 PREV VISIT EST AGE 40-64: CPT | Performed by: NURSE PRACTITIONER

## 2023-05-10 PROCEDURE — 80061 LIPID PANEL: CPT | Performed by: NURSE PRACTITIONER

## 2023-05-10 RX ORDER — BUDESONIDE AND FORMOTEROL FUMARATE DIHYDRATE 160; 4.5 UG/1; UG/1
2 AEROSOL RESPIRATORY (INHALATION) 2 TIMES DAILY
Qty: 10.2 G | Refills: 11 | Status: SHIPPED | OUTPATIENT
Start: 2023-05-10 | End: 2024-04-05

## 2023-05-10 RX ORDER — AMLODIPINE BESYLATE 5 MG/1
5 TABLET ORAL DAILY
Qty: 90 TABLET | Refills: 1 | Status: SHIPPED | OUTPATIENT
Start: 2023-05-10 | End: 2023-12-08

## 2023-05-10 RX ORDER — CLINDAMYCIN PHOSPHATE 10 UG/ML
LOTION TOPICAL 2 TIMES DAILY
Qty: 60 ML | Refills: 0 | Status: SHIPPED | OUTPATIENT
Start: 2023-05-10 | End: 2024-01-04

## 2023-05-10 RX ORDER — CHLORTHALIDONE 25 MG/1
25 TABLET ORAL DAILY
Qty: 90 TABLET | Refills: 1 | Status: SHIPPED | OUTPATIENT
Start: 2023-05-10 | End: 2024-01-17

## 2023-05-10 RX ORDER — ALBUTEROL SULFATE 90 UG/1
AEROSOL, METERED RESPIRATORY (INHALATION)
Qty: 18 G | Refills: 3 | Status: SHIPPED | OUTPATIENT
Start: 2023-05-10 | End: 2024-04-05

## 2023-05-10 RX ORDER — ATENOLOL 50 MG/1
50 TABLET ORAL DAILY
Qty: 90 TABLET | Refills: 1 | Status: SHIPPED | OUTPATIENT
Start: 2023-05-10 | End: 2024-01-17

## 2023-05-10 ASSESSMENT — ASTHMA QUESTIONNAIRES
QUESTION_1 LAST FOUR WEEKS HOW MUCH OF THE TIME DID YOUR ASTHMA KEEP YOU FROM GETTING AS MUCH DONE AT WORK, SCHOOL OR AT HOME: NONE OF THE TIME
QUESTION_3 LAST FOUR WEEKS HOW OFTEN DID YOUR ASTHMA SYMPTOMS (WHEEZING, COUGHING, SHORTNESS OF BREATH, CHEST TIGHTNESS OR PAIN) WAKE YOU UP AT NIGHT OR EARLIER THAN USUAL IN THE MORNING: NOT AT ALL
ACT_TOTALSCORE: 22
QUESTION_5 LAST FOUR WEEKS HOW WOULD YOU RATE YOUR ASTHMA CONTROL: WELL CONTROLLED
ACT_TOTALSCORE: 22
QUESTION_4 LAST FOUR WEEKS HOW OFTEN HAVE YOU USED YOUR RESCUE INHALER OR NEBULIZER MEDICATION (SUCH AS ALBUTEROL): ONCE A WEEK OR LESS
QUESTION_2 LAST FOUR WEEKS HOW OFTEN HAVE YOU HAD SHORTNESS OF BREATH: ONCE OR TWICE A WEEK

## 2023-05-10 ASSESSMENT — ENCOUNTER SYMPTOMS
COUGH: 0
NERVOUS/ANXIOUS: 0
WEAKNESS: 0
ABDOMINAL PAIN: 0
NAUSEA: 0
JOINT SWELLING: 0
CHILLS: 0
ARTHRALGIAS: 0
HEARTBURN: 0
HEMATURIA: 0
CONSTIPATION: 0
MYALGIAS: 0
SORE THROAT: 0
FREQUENCY: 0
DYSURIA: 0
DIARRHEA: 0
FEVER: 0
SHORTNESS OF BREATH: 0
HEMATOCHEZIA: 0
HEADACHES: 1
PALPITATIONS: 0
PARESTHESIAS: 0
DIZZINESS: 1
EYE PAIN: 0

## 2023-05-10 NOTE — PROGRESS NOTES
carola   SUBJECTIVE:   CC: Pam is an 58 year old who presents for preventive health visit.     Colonoscopy 2021- 5 years  Mammogram 9/13/2022  Pap 4/16/2018- NIL, HPV neg    Rash on chest for about a week.  Seemed like an acne break out.  Was a little sore.  Now scabbed.   No itching.  No new detergents or body products.   She thinks it might be resolving now, no new rash developing. Has seen dermatology in the past for skin lesions- she is not sure but sounds like AK.       Started working out 3/1/23, 2-3 times per week, some strength training and cardio.  Trying to eat better but doesn't think she she is eating right.   Cannot lose weight.   Has seen a dietician in the past.  She is wondering about medication for weight loss.         5/10/2023     7:48 AM   Additional Questions   Roomed by erin   Accompanied by self   Patient has been advised of split billing requirements and indicates understanding: Yes  Healthy Habits:     Getting at least 3 servings of Calcium per day:  NO    Bi-annual eye exam:  Yes    Dental care twice a year:  Yes    Sleep apnea or symptoms of sleep apnea:  Daytime drowsiness    Diet:  Regular (no restrictions)    Frequency of exercise:  2-3 days/week    Duration of exercise:  45-60 minutes    Taking medications regularly:  No    Barriers to taking medications:  None    PHQ-2 Total Score: 0    Additional concerns today:  Yes      Today's PHQ-2 Score:       5/10/2023     7:41 AM   PHQ-2 ( 1999 [employer])   Q1: Little interest or pleasure in doing things 0   Q2: Feeling down, depressed or hopeless 0   PHQ-2 Score 0   Q1: Little interest or pleasure in doing things Not at all    Not at all   Q2: Feeling down, depressed or hopeless Not at all    Not at all   PHQ-2 Score 0    0       Social History     Tobacco Use     Smoking status: Former     Packs/day: 0.00     Years: 0.00     Pack years: 0.00     Types: Cigarettes     Quit date: 1/6/2008     Years since quitting: 15.3     Smokeless tobacco:  Never   Vaping Use     Vaping status: Never Used   Substance Use Topics     Alcohol use: Yes     Comment: 3 drinks a week           5/10/2023     7:41 AM   Alcohol Use   Prescreen: >3 drinks/day or >7 drinks/week? No     Reviewed orders with patient.  Reviewed health maintenance and updated orders accordingly - Yes      Breast Cancer Screenin/10/2023     7:42 AM   Breast CA Risk Assessment (FHS-7)   Do you have a family history of breast, colon, or ovarian cancer? No / Unknown       Mammogram Screening: Recommended mammography every 1-2 years with patient discussion and risk factor consideration  Pertinent mammograms are reviewed under the imaging tab.    History of abnormal Pap smear: NO - age 30-65 PAP every 5 years with negative HPV co-testing recommended      Latest Ref Rng & Units 2018     7:50 AM 2018     7:47 AM 11/15/2012     9:49 AM   PAP / HPV   PAP (Historical)   NIL   NIL     HPV 16 DNA NEG^Negative Negative       HPV 18 DNA NEG^Negative Negative       Other HR HPV NEG^Negative Negative         Reviewed and updated as needed this visit by clinical staff   Tobacco  Allergies  Meds  Problems  Med Hx  Surg Hx  Fam Hx          Reviewed and updated as needed this visit by Provider   Tobacco  Allergies  Meds  Problems  Med Hx  Surg Hx  Fam Hx             Review of Systems   Constitutional: Negative for chills and fever.   HENT: Negative for congestion, ear pain, hearing loss and sore throat.    Eyes: Negative for pain and visual disturbance.   Respiratory: Negative for cough and shortness of breath.    Cardiovascular: Positive for peripheral edema. Negative for chest pain and palpitations.   Gastrointestinal: Negative for abdominal pain, constipation, diarrhea, heartburn, hematochezia and nausea.   Genitourinary: Negative for dysuria, frequency, genital sores, hematuria and urgency.   Musculoskeletal: Negative for arthralgias, joint swelling and myalgias.   Skin: Positive for  "rash.   Neurological: Positive for dizziness and headaches. Negative for weakness and paresthesias.   Psychiatric/Behavioral: Negative for mood changes. The patient is not nervous/anxious.         OBJECTIVE:   /86   Pulse 86   Temp 98.9  F (37.2  C)   Ht 1.727 m (5' 8\")   Wt 112 kg (247 lb)   SpO2 94%   BMI 37.56 kg/m    Physical Exam  GENERAL: healthy, alert and no distress  EYES: Eyes grossly normal to inspection, PERRL and conjunctivae and sclerae normal  HENT: normal cephalic/atraumatic  NECK: no adenopathy, no asymmetry, masses, or scars and thyroid normal to palpation  RESP: lungs clear to auscultation - no rales, rhonchi or wheezes  BREAST: normal without masses, tenderness or nipple discharge and no palpable axillary masses or adenopathy  CV: regular rate and rhythm, normal S1 S2, no S3 or S4, no murmur, click or rub, no peripheral edema and peripheral pulses strong  ABDOMEN: soft, nontender, no hepatosplenomegaly, no masses and bowel sounds normal  MS: no gross musculoskeletal defects noted, no edema  SKIN: covering entire chest diffuse erythematous papules in different stages of healing, some open others scabbed.   NEURO: Normal strength and tone, mentation intact and speech normal  PSYCH: mentation appears normal, affect normal/bright    Diagnostic Test Results:  Labs reviewed in Epic    ASSESSMENT/PLAN:   (Z00.00) Routine general medical examination at a health care facility  (primary encounter diagnosis)  Comment:   Plan: Continue annual exams    (Z12.4) Screening for cervical cancer  Comment: Pap completed today  Plan: PAP screen with HPV - recommended age 30 - 65         years          (E66.01) Morbid obesity (H)  Comment: Patient interested in referral.     Plan: Adult Comprehensive Weight Management         Referral          (I10) Hypertension goal BP (blood pressure) < 140/90  Comment: Chronic, stable.  Continue current medications. Metabolic panel in process.     Plan: " "Comprehensive metabolic panel (BMP + Alb, Alk         Phos, ALT, AST, Total. Bili, TP), amLODIPine         (NORVASC) 5 MG tablet, atenolol (TENORMIN) 50         MG tablet, chlorthalidone (HYGROTON) 25 MG         tablet          (R73.03) Prediabetes  Comment: Labs in process.   Plan: Comprehensive metabolic panel (BMP + Alb, Alk         Phos, ALT, AST, Total. Bili, TP), Hemoglobin         A1c          (E78.5) Dyslipidemia  Comment: Labs in process. Currently not on statin.     Plan: Lipid panel reflex to direct LDL Fasting          (L98.9) Skin lesion  Comment: States follows with dermatology for pre-cancerous lesions (? AK), records not available. Needs referral, previous dermatologist not in network.     Plan: Adult Dermatology Referral          (R74.8) Elevated liver enzymes  Comment: She reports hx of intermittently mildly elevated transaminases.  Fatty liver noted on CT abd/pelvis 7/29/222.   Labs in process.   Plan: Comprehensive metabolic panel (BMP + Alb, Alk         Phos, ALT, AST, Total. Bili, TP)          (J45.50) Severe persistent asthma without complication  Comment: Chronic, stable.   ACT score 22.   Continue current medications.   Plan: budesonide-formoterol (SYMBICORT) 160-4.5         MCG/ACT Inhaler, albuterol (VENTOLIN HFA) 108         (90 Base) MCG/ACT inhaler          (L08.9) Skin infection  Comment: Start clindamycin topical.  Follow-up if not improving.    Plan: clindamycin (CLEOCIN T) 1 % external lotion            Patient has been advised of split billing requirements and indicates understanding: Yes      COUNSELING:  Reviewed preventive health counseling, as reflected in patient instructions      BMI:   Estimated body mass index is 37.56 kg/m  as calculated from the following:    Height as of this encounter: 1.727 m (5' 8\").    Weight as of this encounter: 112 kg (247 lb).   Weight management plan: Patient referred to endocrine and/or weight management specialty      She reports that she quit " smoking about 15 years ago. Her smoking use included cigarettes. She has never used smokeless tobacco.      Jennifer Spring CNP  [facility]

## 2023-05-12 LAB
BKR LAB AP GYN ADEQUACY: NORMAL
BKR LAB AP GYN INTERPRETATION: NORMAL
BKR LAB AP HPV REFLEX: NORMAL
BKR LAB AP PREVIOUS ABNORMAL: NORMAL
PATH REPORT.COMMENTS IMP SPEC: NORMAL
PATH REPORT.COMMENTS IMP SPEC: NORMAL
PATH REPORT.RELEVANT HX SPEC: NORMAL

## 2023-05-16 LAB
HUMAN PAPILLOMA VIRUS 16 DNA: NEGATIVE
HUMAN PAPILLOMA VIRUS 18 DNA: NEGATIVE
HUMAN PAPILLOMA VIRUS FINAL DIAGNOSIS: NORMAL
HUMAN PAPILLOMA VIRUS OTHER HR: NEGATIVE

## 2023-07-14 ENCOUNTER — OFFICE VISIT (OUTPATIENT)
Dept: FAMILY MEDICINE | Facility: CLINIC | Age: 58
End: 2023-07-14
Payer: COMMERCIAL

## 2023-07-14 VITALS — OXYGEN SATURATION: 97 % | BODY MASS INDEX: 35.61 KG/M2 | HEIGHT: 68 IN | WEIGHT: 235 LBS | TEMPERATURE: 97.9 F

## 2023-07-14 DIAGNOSIS — R51.9 CHRONIC DAILY HEADACHE: Primary | ICD-10-CM

## 2023-07-14 DIAGNOSIS — R42 DIZZINESS: ICD-10-CM

## 2023-07-14 PROCEDURE — 99214 OFFICE O/P EST MOD 30 MIN: CPT | Performed by: NURSE PRACTITIONER

## 2023-07-14 ASSESSMENT — ENCOUNTER SYMPTOMS: HEADACHES: 1

## 2023-07-14 NOTE — PROGRESS NOTES
"Assessment & Plan     Chronic daily headache  - Having daily headaches for the last 6 months. The headaches are becoming more persistent and lasting all day long. Advil is no longer helping to alleviate the pain. Will start amitriptyline at bedtime to help with daily headaches and hopefully improve sleep. Referral sent to neurology. MRI ordered given age and new onset and dizziness.  Recommending she also see her eye doctor.   - amitriptyline (ELAVIL) 25 MG tablet  Dispense: 30 tablet; Refill: 2  - Adult Neurology  Referral  - MR Brain w/o Contrast    Dizziness  - Orthostatic blood pressures non-remarkable.   - MR Brain w/o Contrast  - Adult Neurology  Referral    Wendy John RN, DNP Student    Jennifer Spring CNP  [facility]  Provider Disclosure:  I agree with above History, Review of Systems, Physical exam and Plan. I have reviewed the content of the documentation and have edited it as needed. I have personally performed the services documented here and the documentation accurately represents those services and the decisions I have made.         Bren Orozco is a 58 year old, presenting for the following health issues:  Headache        7/14/2023     7:18 AM   Additional Questions   Roomed by erin   Accompanied by self     Headache     History of Present Illness       Headaches:   Since the patient's last clinic visit, headaches are: worsened  The patient is getting headaches:  Daily  She is not able to do normal daily activities when she has a migraine.  The patient is taking the following rescue/relief medications:  Ibuprofen (Advil, Motrin)   Patient states \"The relief is inconsistent\" from the rescue/relief medications.   The patient is taking the following medications to prevent migraines:  No medications to prevent migraines  In the past 4 weeks, the patient has gone to an Urgent Care or Emergency Room 0 times times due to headaches.    She eats " "2-3 servings of fruits and vegetables daily.She consumes 0 sweetened beverage(s) daily.She exercises with enough effort to increase her heart rate 30 to 60 minutes per day.  She exercises with enough effort to increase her heart rate 3 or less days per week.   She is taking medications regularly.     History of migraines. These are different than her typical migraines. Her typical migraines start on the left side above her eye. Was on topamax for migraines for years. Migraines where completely resolved at the time so her previous provider discontinued the topamax. Hasn't had a migraine in the last 6 months. States these headaches are not migraines. The headaches have occurred for the last 6 months but in the last 2 months they have occured daily.     Prior to 2 months ago she noticed that when she would standing she would get dizzy and would have an instant headache. Then when she took a break the dizziness and headaches would go away completely.     Now she is having dizziness with headaches daily which has been going on for about 2 months or so. The dizziness resolves within a few minutes of onset but the headaches can last all day. Feels like she needs to stop what she is doing so that she doesn't fall over. Dizziness is intermittent now but the headaches are constant. 9/10 pain on average, sharp pain comes and goes throughout the day to the left side of her head. Sometimes the pain is in the front, sometimes on the side, or sometimes on the back. Yesterday her head felt like she was in a vice .     Currently taking Advil 2-3 pills per day for relief which has been intermittently helpful.  Has not tried ice or heat.  Driving and computer work aggravates headaches. Has \"floaty things\" in her eyes, these have been there for many years. Has not been to an eye doctor in the last year. Does not wear glasses or contacts. Denies other vision changes or auras. Denies vomiting. Can have nausea. Denies head or neck " "injuries or neck tightness/strain. Denies numbness, tingling, and weakness.  Low stress, sleep is not great but has not changed, is always tired. Currently on a diet where she has to drink half her body weight in water. Has lost 15 lbs since her last clinic visit.      Review of Systems   Neurological: Positive for headaches.   Constitutional, HEENT, cardiovascular, pulmonary, GI, , musculoskeletal, neuro, skin, endocrine and psych systems are negative, except as otherwise noted and above patient answer.         Objective    /80   Pulse 60   Temp 97.9  F (36.6  C)   Ht 1.727 m (5' 8\")   Wt 106.6 kg (235 lb)   SpO2 95%   BMI 35.73 kg/m    Body mass index is 35.73 kg/m .  Physical Exam   GENERAL: healthy, alert and no distress  EYES: Eyes grossly normal to inspection, PERRL and conjunctivae and sclerae normal  HENT: ear canals and TM's normal, nose and mouth without ulcers or lesions  NECK: no adenopathy, no asymmetry, masses, or scars and thyroid normal to palpation  RESP: lungs clear to auscultation - no rales, rhonchi or wheezes  CV: regular rate and rhythm, normal S1 S2, no S3 or S4, no murmur, click or rub, no peripheral edema and peripheral pulses strong  MS: no gross musculoskeletal defects noted, no edema  NEURO: Normal strength and tone, sensory exam grossly normal, mentation intact, speech normal, cranial nerves 2-12 intact, gait normal, heel/toe/tandem walking is uncoordinated, Romberg normal and rapid alternating movements normal  PSYCH: mentation appears normal, affect normal/bright              "

## 2023-07-14 NOTE — PATIENT INSTRUCTIONS
Headache/dizziness- MRI brain ordered.     Blood pressure did not drop today in office, but still recommend upping water, changing positions very slowly.   Will try amlodipine 25 mg daily at bedtime to help with sleep and see if it decreases headaches.   Would recommend eye exam to make sure there are no changes and that vision is not contributing to headaches.    Referral to neurology if you are not improving.

## 2023-08-30 ENCOUNTER — TELEPHONE (OUTPATIENT)
Dept: INTERNAL MEDICINE | Facility: CLINIC | Age: 58
End: 2023-08-30
Payer: COMMERCIAL

## 2023-09-01 NOTE — TELEPHONE ENCOUNTER
Patient is calling again. Patient has questions,can you please call her.Beba ANGELES [facility]

## 2023-09-06 NOTE — TELEPHONE ENCOUNTER
Insurance referral has been approved by medical review.  I have sent these to [facility] referral numbers [id number], [id number] and [id number].  They have also been faxed to [facility] at the fax number the patient provided.      My chart message has been sent to patient informing and with the referral numbers/details.

## 2023-09-14 ENCOUNTER — TRANSFERRED RECORDS (OUTPATIENT)
Dept: HEALTH INFORMATION MANAGEMENT | Facility: CLINIC | Age: 58
End: 2023-09-14
Payer: COMMERCIAL

## 2023-10-12 ENCOUNTER — OFFICE VISIT (OUTPATIENT)
Dept: DERMATOLOGY | Facility: CLINIC | Age: 58
End: 2023-10-12
Attending: NURSE PRACTITIONER
Payer: COMMERCIAL

## 2023-10-12 DIAGNOSIS — D48.9 NEOPLASM OF UNCERTAIN BEHAVIOR: ICD-10-CM

## 2023-10-12 DIAGNOSIS — D22.9 MULTIPLE BENIGN NEVI: Primary | ICD-10-CM

## 2023-10-12 DIAGNOSIS — L57.0 AK (ACTINIC KERATOSIS): ICD-10-CM

## 2023-10-12 DIAGNOSIS — Z12.83 ENCOUNTER FOR SCREENING FOR MALIGNANT NEOPLASM OF SKIN: ICD-10-CM

## 2023-10-12 DIAGNOSIS — L81.4 LENTIGINES: ICD-10-CM

## 2023-10-12 DIAGNOSIS — D18.01 CHERRY ANGIOMA: ICD-10-CM

## 2023-10-12 DIAGNOSIS — L82.1 SEBORRHEIC KERATOSES: ICD-10-CM

## 2023-10-12 DIAGNOSIS — L73.8 SEBACEOUS HYPERPLASIA OF FACE: ICD-10-CM

## 2023-10-12 PROCEDURE — 88305 TISSUE EXAM BY PATHOLOGIST: CPT | Performed by: PATHOLOGY

## 2023-10-12 PROCEDURE — 99204 OFFICE O/P NEW MOD 45 MIN: CPT | Mod: 25 | Performed by: NURSE PRACTITIONER

## 2023-10-12 PROCEDURE — 11102 TANGNTL BX SKIN SINGLE LES: CPT | Performed by: NURSE PRACTITIONER

## 2023-10-12 RX ORDER — CALCIPOTRIENE 50 UG/G
CREAM TOPICAL
Qty: 60 G | Refills: 1 | Status: SHIPPED | OUTPATIENT
Start: 2023-10-12

## 2023-10-12 RX ORDER — FLUOROURACIL 50 MG/G
CREAM TOPICAL
Qty: 40 G | Refills: 1 | Status: SHIPPED | OUTPATIENT
Start: 2023-10-12

## 2023-10-12 NOTE — LETTER
10/12/2023         RE: Pam Mora  [id number] [facility] [id number]        Dear Colleague,    Thank you for referring your patient, Pam Mora, to the [facility]. Please see a copy of my visit note below.    [facility] Dermatology Note  Encounter Date: Oct 12, 2023  Office Visit     Reviewed patients past medical history and pertinent chart review prior to patients visit today.     Dermatology Problem List:  NUB mid chest, shave biopsy 10/12/23 .   History of SCC on right 4th finger 10+ years ago per patient  Extensive sun damage. Efudex/calcipotriene ordered for dorsum hand/arms first, then face, then chest winter 23/24.  -has treated chest and dorsum hands in the past with efudex    She has been to [place] recently and says she regularly applies SPF 45 but is extremely tanned today and returning to [place] for another trip in 2 weeks.   ____________________________________________    Assessment & Plan:   # Neoplasm of uncertain behavior:  mid chest  DDx includes BCC vs SCC. Shave biopsy today.    Procedure Note: Biopsy by shave technique  The risks and benefits of the procedure were described to the patient. These include but are not limited to bleeding, infection, scar, incomplete removal, and non-diagnostic biopsy. Verbal informed consent was obtained. The above site(s) was cleansed with an alcohol pad and injected with 1% lidocaine with epinephrine. Once anesthesia was obtained, a biopsy(ies) was performed with Gilette blade. The tissue(s) was placed in a labeled container(s) with formalin and sent to pathology. Hemostasis was achieved with aluminum chloride. Vaseline and a bandage were applied to the wound(s). The patient tolerated the procedure well and was given post biopsy care instructions.     # Actinic keratosis.  Discussed treatment options with patient including cryotherapy, Efudex, and Efudex plus calcipotriene.  Patient prefers  to treat the Tops of hands/fingers/arms first, face second, chest last with Efudex plus calcipotriene.  Start Efudex calcipotriene twice daily x5-10 days. Counseled that patient should expect erythema and scale, but should discontinue this medication if significant oozing or pain greater than 5/10 is to occur. Recommend the patient wash hands after use or use gloves to apply. Keep medication away from pets. Handout provided with administration instructions.      # Benign skin findings including: seborrheic keratoses, cherry angioma, lentigines and benign nevi.   - No further intervention required. Patient to report changes.   - Patient reassured of the benign nature of these lesions.    #Signs and Symptoms of non-melanoma skin cancer and ABCDEs of melanoma reviewed with patient. Patient encouraged to perform monthly self skin exams and educated on how to perform them. UV precautions reviewed with patient. Patient was asked about new or changing moles/lesions on body.     #Reviewed Sunscreen: Apply 20 minutes prior to going outdoors and reapply every two hours, when wet or sweating. We recommend using an SPF 30 or higher, and to use one that is water resistant.       Follow-up:  6 months for follow up full body skin exam, prn for new or changing lesions or new concerns    Roxane Farrar CNP  Dermatology     ____________________________________________    CC: Skin Check (Hx of SCC on right ring finger. [facility] derm was seeing and tx'd AK hands and feet. )    HPI:  Ms. Pam Mora is a(n) 58 year old female who presents today as a new patient for a full body skin cancer screening. Patient has no specific concerns today. [facility] and states she has had one SCC removed from her right finger, she thinks 3rd or 4th but scar is not visible.     Patient is otherwise feeling well, without additional skin concerns.     Physical Exam:  Vitals: There were no vitals taken for this  visit.  SKIN: Total skin excluding the genitalia areas was performed. The exam included the head/face, neck, both arms, chest, back, abdomen, both legs, digits, mons pubis, buttock and nails.   -mid chest, irregular shaped pink shiny papule about 6 mm   -extensive actinic damage with scaly and hypertrophic papules on the dorsum hands, all extremities, chest and face  -several 1-2mm red dome shaped symmetric papules scattered on the trunk  -multiple tan/brown flat round macules and raised papules scattered throughout trunk, extremities and head. No worrisome features for malignancy noted on examination.  -scattered tan, homogenous macules scattered on sun exposed areas of trunk, extremities and face.   -scattered waxy, stuck on tan/brown papules and patches on the trunk face and extremities  --There are yellow oily papules with central dells on the face.      - No other lesions of concern on areas examined.     Medications:  Current Outpatient Medications   Medication     albuterol (VENTOLIN HFA) 108 (90 Base) MCG/ACT inhaler     amitriptyline (ELAVIL) 25 MG tablet     amLODIPine (NORVASC) 5 MG tablet     atenolol (TENORMIN) 50 MG tablet     benzonatate (TESSALON) 200 MG capsule     budesonide-formoterol (SYMBICORT) 160-4.5 MCG/ACT Inhaler     chlorthalidone (HYGROTON) 25 MG tablet     clindamycin (CLEOCIN T) 1 % external lotion     ipratropium - albuterol 0.5 mg/2.5 mg/3 mL (DUONEB) 0.5-2.5 (3) MG/3ML neb solution     MULTI-VITAMIN OR TABS     polyethylene glycol (MIRALAX) 17 GM/Dose powder     promethazine-DM (PHENERGAN-DM) 6.25-15 MG/5ML syrup     No current facility-administered medications for this visit.      Past Medical History:   Patient Active Problem List   Diagnosis     Rhinitis, allergic seasonal     GERD (gastroesophageal reflux disease)     Incidental lung nodule     Hypertension goal BP (blood pressure) < 140/90     Migraines     Abnormal thyroid scan     Menopause     Elevated liver enzymes      Severe persistent asthma without complication (H28)     Dyslipidemia     Prediabetes     Morbid obesity (H)     Abnormal ultrasound of endometrium     Past Medical History:   Diagnosis Date     AR (allergic rhinitis)      Asthma     Persistant, mild     Elevated fasting glucose      GERD (gastroesophageal reflux disease)      High cholesterol      HTN (hypertension)      Incidental lung nodule 05/07     LSIL (low grade squamous intraepithelial lesion) on Pap smear      Migraine      Obesity (BMI 30-39.9)        CC Jennifer Spring, CNP  [address] [place] [address] on close of this encounter.      Again, thank you for allowing me to participate in the care of your patient.        Sincerely,        Nicki Farrar, SONIA CNP

## 2023-10-12 NOTE — PROGRESS NOTES
[facility] Note  Encounter Date: Oct 12, 2023  Office Visit     Reviewed patients past medical history and pertinent chart review prior to patients visit today.     Dermatology Problem List:  NUB mid chest, shave biopsy 10/12/23 .   History of SCC on right 4th finger 10+ years ago per patient  Extensive sun damage. Efudex/calcipotriene ordered for dorsum hand/arms first, then face, then chest winter 23/24.  -has treated chest and dorsum hands in the past with efudex    She has been to [place] recently and says she regularly applies SPF 45 but is extremely tanned today and returning to [place] for another trip in 2 weeks.   ____________________________________________    Assessment & Plan:   # Neoplasm of uncertain behavior:  mid chest  DDx includes BCC vs SCC. Shave biopsy today.    Procedure Note: Biopsy by shave technique  The risks and benefits of the procedure were described to the patient. These include but are not limited to bleeding, infection, scar, incomplete removal, and non-diagnostic biopsy. Verbal informed consent was obtained. The above site(s) was cleansed with an alcohol pad and injected with 1% lidocaine with epinephrine. Once anesthesia was obtained, a biopsy(ies) was performed with Gilette blade. The tissue(s) was placed in a labeled container(s) with formalin and sent to pathology. Hemostasis was achieved with aluminum chloride. Vaseline and a bandage were applied to the wound(s). The patient tolerated the procedure well and was given post biopsy care instructions.     # Actinic keratosis.  Discussed treatment options with patient including cryotherapy, Efudex, and Efudex plus calcipotriene.  Patient prefers to treat the Tops of hands/fingers/arms first, face second, chest last with Efudex plus calcipotriene.  Start Efudex calcipotriene twice daily x5-10 days. Counseled that patient should expect erythema and scale, but should discontinue this medication if  significant oozing or pain greater than 5/10 is to occur. Recommend the patient wash hands after use or use gloves to apply. Keep medication away from pets. Handout provided with administration instructions.      # Benign skin findings including: seborrheic keratoses, cherry angioma, lentigines and benign nevi.   - No further intervention required. Patient to report changes.   - Patient reassured of the benign nature of these lesions.    #Signs and Symptoms of non-melanoma skin cancer and ABCDEs of melanoma reviewed with patient. Patient encouraged to perform monthly self skin exams and educated on how to perform them. UV precautions reviewed with patient. Patient was asked about new or changing moles/lesions on body.     #Reviewed Sunscreen: Apply 20 minutes prior to going outdoors and reapply every two hours, when wet or sweating. We recommend using an SPF 30 or higher, and to use one that is water resistant.       Follow-up:  6 months for follow up full body skin exam, prn for new or changing lesions or new concerns    Roxane Farrar CNP  Dermatology     ____________________________________________    CC: Skin Check (Hx of SCC on right ring finger. [place] derm was seeing and tx'd AK hands and feet. )    HPI:  Ms. Pam Mora is a(n) 58 year old female who presents today as a new patient for a full body skin cancer screening. Patient has no specific concerns today. [facility] and states she has had one SCC removed from her right finger, she thinks 3rd or 4th but scar is not visible.     Patient is otherwise feeling well, without additional skin concerns.     Physical Exam:  Vitals: There were no vitals taken for this visit.  SKIN: Total skin excluding the genitalia areas was performed. The exam included the head/face, neck, both arms, chest, back, abdomen, both legs, digits, mons pubis, buttock and nails.   -mid chest, irregular shaped pink shiny papule about 6 mm    -extensive actinic damage with scaly and hypertrophic papules on the dorsum hands, all extremities, chest and face  -several 1-2mm red dome shaped symmetric papules scattered on the trunk  -multiple tan/brown flat round macules and raised papules scattered throughout trunk, extremities and head. No worrisome features for malignancy noted on examination.  -scattered tan, homogenous macules scattered on sun exposed areas of trunk, extremities and face.   -scattered waxy, stuck on tan/brown papules and patches on the trunk face and extremities  --There are yellow oily papules with central dells on the face.      - No other lesions of concern on areas examined.     Medications:  Current Outpatient Medications   Medication    albuterol (VENTOLIN HFA) 108 (90 Base) MCG/ACT inhaler    amitriptyline (ELAVIL) 25 MG tablet    amLODIPine (NORVASC) 5 MG tablet    atenolol (TENORMIN) 50 MG tablet    benzonatate (TESSALON) 200 MG capsule    budesonide-formoterol (SYMBICORT) 160-4.5 MCG/ACT Inhaler    chlorthalidone (HYGROTON) 25 MG tablet    clindamycin (CLEOCIN T) 1 % external lotion    ipratropium - albuterol 0.5 mg/2.5 mg/3 mL (DUONEB) 0.5-2.5 (3) MG/3ML neb solution    MULTI-VITAMIN OR TABS    polyethylene glycol (MIRALAX) 17 GM/Dose powder    promethazine-DM (PHENERGAN-DM) 6.25-15 MG/5ML syrup     No current facility-administered medications for this visit.      Past Medical History:   Patient Active Problem List   Diagnosis    Rhinitis, allergic seasonal    GERD (gastroesophageal reflux disease)    Incidental lung nodule    Hypertension goal BP (blood pressure) < 140/90    Migraines    Abnormal thyroid scan    Menopause    Elevated liver enzymes    Severe persistent asthma without complication (H28)    Dyslipidemia    Prediabetes    Morbid obesity (H)    Abnormal ultrasound of endometrium     Past Medical History:   Diagnosis Date    AR (allergic rhinitis)     Asthma     Persistant, mild    Elevated fasting glucose      GERD (gastroesophageal reflux disease)     High cholesterol     HTN (hypertension)     Incidental lung nodule 05/07    LSIL (low grade squamous intraepithelial lesion) on Pap smear     Migraine     Obesity (BMI 30-39.9)        CC Jennifer Spring, CNP  [id number] [place] [address] on close of this encounter.

## 2023-10-12 NOTE — PATIENT INSTRUCTIONS
Wound Care Instructions     FOR SUPERFICIAL WOUNDS     [facility], [facility] or    [facility] [phone]          AFTER 24 HOURS YOU SHOULD REMOVE THE BANDAGE AND BEGIN DAILY DRESSING CHANGES AS FOLLOWS:     1) Remove Dressing.     2) Clean and dry the area with tap water using a Q-tip or sterile gauze pad.     3) Apply Vaseline, Aquaphor, Polysporin ointment or Bacitracin ointment over entire wound.  Do NOT use Neosporin ointment.     4) Cover the wound with a band-aid, or a sterile non-stick gauze pad and micropore paper tape      REPEAT THESE INSTRUCTIONS AT LEAST ONCE A DAY UNTIL THE WOUND HAS COMPLETELY HEALED.    It is an old wives tale that a wound heals better when it is exposed to air and allowed to dry out. The wound will heal faster with a better cosmetic result if it is kept moist with ointment and covered with a bandage.    **Do not let the wound dry out.**      Supplies Needed:      *Cotton tipped applicators (Q-tips)    *Polysporin Ointment or Bacitracin Ointment (NOT NEOSPORIN)    *Band-aids or non-stick gauze pads and micropore paper tape.      PATIENT INFORMATION:    During the healing process you will notice a number of changes. All wounds develop a small halo of redness surrounding the wound.  This means healing is occurring. Severe itching with extensive redness usually indicates sensitivity to the ointment or bandage tape used to dress the wound.  You should call our office if this develops.      Swelling  and/or discoloration around your surgical site is common, particularly when performed around the eye.    All wounds normally drain.  The larger the wound the more drainage there will be.  After 7-10 days, you will notice the wound beginning to shrink and new skin will begin to grow.  The wound is healed when you can see skin has formed over the entire area.  A healed wound has a healthy, shiny look to the surface and is red to dark pink in color to normalize.   Wounds may take approximately 4-6 weeks to heal.  Larger wounds may take 6-8 weeks.  After the wound is healed you may discontinue dressing changes.    You may experience a sensation of tightness as your wound heals. This is normal and will gradually subside.    Your healed wound may be sensitive to temperature changes. This sensitivity improves with time, but if you re having a lot of discomfort, try to avoid temperature extremes.    Patients frequently experience itching after their wound appears to have healed because of the continue healing under the skin.  Plain Vaseline will help relieve the itching.        POSSIBLE COMPLICATIONS    BLEEDING:    Leave the bandage in place.  Use tightly rolled up gauze or a cloth to apply direct pressure over the bandage for 30  minutes.  Reapply pressure for an additional 30 minutes if necessary  Use additional gauze and tape to maintain pressure once the bleeding has stopped.               Tops of hands/fingers/arms first, face second, chest last.       Efudex and calcipotriene combination Treatment  Today, you are being prescribed Fluorouracil (Efudex) a topical cream used for the treatment of Actinic Keratosis (AKs).  The medication is working to eliminate the unhealthy cells.  This treatment may be unattractive and somewhat uncomfortable.  Your treatment will last 5-10 days. You may experience some mild discomfort while being treated.  You will want to stop any other creams such as glycolic acid products, retin A, Tazorac, etc. to the area. You may use bland makeup/cover-up as long as it doesn't sting or cause you discomfort.  When using calcipotriene and Fluorouracil, apply the cream both morning and night for 5-10 days as your provider recommends. Use a Q-tip or use gloves if applying it with your fingertips. If applied with unprotected fingertips, it is important to wash your hands well after you apply this medicine. If you are using the Fluorouracil alone, apply only  at night for 2-4 weeks.   Keep this medication away from pets.  We recommend avoiding excessive sun exposure to the treated area. Wear a sunscreen with SPF 50+ to the areas being treated prior to any sun exposure as you will be more sensitive to the sun and more prone to sunburn while being treated and afterwards during healing process.   You may use ointments such as vaseline or Aquaphor, or moisturizing creams such as Vanicream or Cetaphil cream over bothersome areas. If the reaction becomes itchy you may apply over the counter hydrocorisone 1% cream or ointment twice daily to itchy areas. If the reaction becomes too bothersome, please call the clinic as we may need to discontinue treatment or reevaluate in the clinic.     Potential Side Effects  Your treated areas may be unsightly during therapy.  This will improve slowly following the discontinuation of therapy.   During the beginning of treatment, mild inflammation may occur.   During the end of treatment, redness, and swelling may occur with some crusting and burning.   Lesions resolve as the skin exfoliates.   Over 1 to 2 weeks, new skin grows into the treatment area.  Keep this medication away from pets as it can be very harmful to them.     Specific side effects that usually do not require medical attention (report to your doctor or health care professional if they continue or are bothersome) include:  Red or dark-colored skin   Mild erosion (loss of upper layer of skin)   Mild eye irritation including burning, itching, sensitivity, stinging, or watering   Increased sensitivity of the skin to sun and ultraviolet light   Pain and burning of the affected area   Dryness, scaling or swelling of the affected area   Skin rash, itching of the affected area   Tenderness   If you have severe pain, please, call the clinic immediately and indicate that you have pain.  Ask for a call from the RN.     Who should I call with questions?  Dermatology nurse line:  [phone]

## 2023-10-18 LAB
PATH REPORT.COMMENTS IMP SPEC: NORMAL
PATH REPORT.FINAL DX SPEC: NORMAL
PATH REPORT.GROSS SPEC: NORMAL
PATH REPORT.MICROSCOPIC SPEC OTHER STN: NORMAL
PATH REPORT.RELEVANT HX SPEC: NORMAL

## 2023-10-19 ENCOUNTER — TELEPHONE (OUTPATIENT)
Dept: DERMATOLOGY | Facility: CLINIC | Age: 58
End: 2023-10-19
Payer: COMMERCIAL

## 2023-10-19 DIAGNOSIS — D48.9 NEOPLASM OF UNCERTAIN BEHAVIOR: Primary | ICD-10-CM

## 2023-10-19 NOTE — TELEPHONE ENCOUNTER
Spoke with patient regarding message below. She states she does not have any questions.  Patient would like to be seen at [facility] for treatment. I explained a referral will be sent and someone from their team will reach out for scheduling.     Referral sent.     Thank you  Wendy HEBERT RN, BSN  [facility]  [phone]

## 2023-10-19 NOTE — TELEPHONE ENCOUNTER
----- Message from SONIA Schwarz CNP sent at 10/18/2023  3:53 PM CDT -----    Please call patient to schedule excision with derm surgeons    Isac Orozco,     We got your biopsy results back regarding the chest. There were abnormal cells and a lot of sun damage under the microscope. They can not rule out that this was a squamous cell skin cancer and recommend removal of the rest of the lesion. Squamous Cell skin cancer is usually caused by years of sun exposure. This skin cancer typically is well contained within the skin and rarely spreads elsewhere in the body. If left untreated it will continue to grow over time and may cause issues with deformity or spreading, therefore complete removal is recommended. I will refer you to our surgeons for an excision. One of their nurses will reach out to you to schedule this shortly. Let me know if you have any other questions.     Roxane Farrar CNP   Written by SONIA Schwarz CNP on 10/18/2023  3:53 PM CDT  Seen by patient Pam Mora on 10/18/2023  3:55 PM

## 2023-10-24 ENCOUNTER — TELEPHONE (OUTPATIENT)
Dept: DERMATOLOGY | Facility: CLINIC | Age: 58
End: 2023-10-24
Payer: COMMERCIAL

## 2023-10-24 NOTE — TELEPHONE ENCOUNTER
Called patient to schedule surgery with Dr. Taylor    Date of Surgery: 12/28    Surgery type: excision    Consult scheduled: No    Has patient had mohs with us before? Not Applicable    Additional comments: arin Varghese on 10/24/2023 at 8:57 AM

## 2023-10-24 NOTE — TELEPHONE ENCOUNTER
Left patient a voicemail to schedule for   excision: possible SCC chest- cannot rule out, please see path    with Dr. Taylor in [place]. Provided my direct phone number.    Meera Varghese on 10/24/2023 at 8:54 AM

## 2023-12-07 NOTE — PROGRESS NOTES
General Cardiology Clinic[facility]      Referring provider: Self-referred    HPI: Ms. Pam Mora is a 58 year old  female with PMH significant for    -Hypertension  -Morbid obesity  -Severe persistent asthma  -Elevated liver enzymes  -GERD  -Severe hyperlipidemia ( on 5/10/2023, patient not on statin)  -Former smoker (25 pack years, quit at the age of 48)    Patient is transferring her care from outside hospital.  She has been following with cardiology for mild aortic regurgitation.  She was recently found to have mildly dilated ascending aorta.    Patient reports dull ache in the middle of the chest for months.  She tells me that she had this all her life.  She works out and does not report chest discomfort with exercise.  Occasional palpitations.  No shortness of breath.  No lower extremity edema.      Patient underwent transthoracic echocardiogram at [facility] in March of this year which showed normal biventricular function, trileaflet aortic valve, aortic valve sclerosis and mild AI.  Ascending aorta reported at 3.9 cm.  Prior echo in 2020 showed similar findings except ascending aorta was 3.5 cm.    Current cardiac medications  Chlorthalidone 25 mg  Amlodipine 5 mg  Atenolol 50 mg daily    Patient is also on amitriptyline, inhalers and Symbicort.    Medications, personal, family, and social history reviewed with patient and revised.    PAST MEDICAL HISTORY:  Past Medical History:   Diagnosis Date    AR (allergic rhinitis)     Asthma     Persistant, mild    Elevated fasting glucose     GERD (gastroesophageal reflux disease)     High cholesterol     HTN (hypertension)     Incidental lung nodule 05/07    LSIL (low grade squamous intraepithelial lesion) on Pap smear     Migraine     Obesity (BMI 30-39.9)        CURRENT MEDICATIONS:  Current Outpatient Medications   Medication Sig Dispense Refill     albuterol (VENTOLIN HFA) 108 (90 Base) MCG/ACT inhaler INHALE 2 PUFFS BY MOUTH EVERY 4 HOURS AS NEEDED FOR SHORTNESS OF BREATH OR WHEEZING 18 g 3    amitriptyline (ELAVIL) 25 MG tablet Take 1 tablet (25 mg) by mouth At Bedtime 30 tablet 2    amLODIPine (NORVASC) 5 MG tablet Take 1 tablet (5 mg) by mouth daily 90 tablet 1    atenolol (TENORMIN) 50 MG tablet Take 1 tablet (50 mg) by mouth daily 90 tablet 1    benzonatate (TESSALON) 200 MG capsule Take 1 capsule (200 mg) by mouth 3 times daily as needed for cough (Patient not taking: Reported on 5/10/2023) 30 capsule 0    budesonide-formoterol (SYMBICORT) 160-4.5 MCG/ACT Inhaler Inhale 2 puffs into the lungs 2 times daily 10.2 g 11    calcipotriene (DOVONOX) 0.005 % external cream Mix with fluorouracil and apply BID to affected areas for 5-10 days until desired result is achieved. Wash hands after applying. Avoid sun 60 g 1    chlorthalidone (HYGROTON) 25 MG tablet Take 1 tablet (25 mg) by mouth daily 90 tablet 1    clindamycin (CLEOCIN T) 1 % external lotion Apply topically 2 times daily (Patient not taking: Reported on 7/14/2023) 60 mL 0    fluorouracil (EFUDEX) 5 % external cream Mix with calcipotriene and apply BID to affected areas for 5-10 days. 40 g 1    ipratropium - albuterol 0.5 mg/2.5 mg/3 mL (DUONEB) 0.5-2.5 (3) MG/3ML neb solution Take 1 vial (3 mLs) by nebulization every 6 hours as needed for shortness of breath / dyspnea or wheezing (Patient not taking: Reported on 5/10/2023) 90 mL 0    MULTI-VITAMIN OR TABS 1 TABLET DAILY      polyethylene glycol (MIRALAX) 17 GM/Dose powder Take 17 g (1 capful) by mouth daily (Patient not taking: Reported on 5/10/2023) 507 g 1    promethazine-DM (PHENERGAN-DM) 6.25-15 MG/5ML syrup Take 5 mLs by mouth every 4 hours as needed for cough (Patient not taking: Reported on 5/10/2023) 118 mL 0       PAST SURGICAL HISTORY:  Past Surgical History:   Procedure Laterality Date    BIOPSY      THYROID , UTERUS, CERVIX    NO HISTORY  "OF SURGERY      Dated 10/12/10       ALLERGIES:     Allergies   Allergen Reactions    Polytrim [Polymyxin B-Trimethoprim]     Sulfa Drugs [Sulfa Antibiotics] Hives       FAMILY HISTORY:  Family History   Problem Relation Age of Onset    Neurologic Disorder Maternal Grandfather         parkinsons    Diabetes Paternal Grandfather          SOCIAL HISTORY:  Social History     Tobacco Use    Smoking status: Former     Packs/day: 0.00     Years: 0.00     Additional pack years: 0.00     Total pack years: 0.00     Types: Cigarettes     Quit date: 1/6/2008     Years since quitting: 15.9    Smokeless tobacco: Never   Vaping Use    Vaping Use: Never used   Substance Use Topics    Alcohol use: Yes     Comment: 3 drinks a week    Drug use: No       ROS:   Constitutional: No fever, chills, or sweats. Weight stable.   Cardiovascular: As per HPI.       Exam:  BP (!) 166/97   Pulse 60   Ht 1.727 m (5' 8\")   Wt 108.4 kg (239 lb)   SpO2 98%   BMI 36.34 kg/m    GENERAL APPEARANCE: alert and no distress  HEENT: no icterus, no central cyanosis  LYMPH/NECK: no adenopathy, no asymmetry, JVP not elevated, no carotid bruits.  RESPIRATORY: lungs clear to auscultation - no rales, rhonchi or wheezes, no use of accessory muscles, no retractions, respirations are unlabored, normal respiratory rate  CARDIOVASCULAR: regular rhythm, normal S1, S2, no S3 or S4 and no murmur, click or rub, precordium quiet with normal PMI.  GI: soft, non tender  EXTREMITIES: no edema  NEURO: alert, normal speech,and affect  SKIN: no ecchymoses, no rashes     I have reviewed the labs and personally reviewed the imaging below and made my comment in the assessment and plan.    Labs:  CBC RESULTS:   Lab Results   Component Value Date    WBC 7.3 11/21/2022    WBC 5.2 03/07/2020    RBC 5.06 11/21/2022    RBC 4.84 03/07/2020    HGB 16.7 (H) 11/21/2022    HGB 15.4 03/07/2020    HCT 47.9 (H) 11/21/2022    HCT 45.4 03/07/2020    MCV 95 11/21/2022    MCV 94 03/07/2020    " MCH 33.0 11/21/2022    MCH 31.8 03/07/2020    MCHC 34.9 11/21/2022    MCHC 33.9 03/07/2020    RDW 12.9 11/21/2022    RDW 12.8 03/07/2020     11/21/2022     03/07/2020       BMP RESULTS:  Lab Results   Component Value Date     05/10/2023     06/04/2021    POTASSIUM 3.8 05/10/2023    POTASSIUM 3.8 06/04/2021    CHLORIDE 98 05/10/2023    CHLORIDE 102 06/04/2021    CO2 32 05/10/2023    CO2 32 06/04/2021    ANIONGAP 7 05/10/2023    ANIONGAP 5 06/04/2021     (H) 05/10/2023     (H) 06/04/2021    BUN 14 05/10/2023    BUN 15 06/04/2021    CR 0.74 05/10/2023    CR 0.74 06/04/2021    GFRESTIMATED >90 05/10/2023    GFRESTIMATED >90 06/04/2021    GFRESTBLACK >90 06/04/2021    BRIELLE 9.3 05/10/2023    BRIELLE 9.5 06/04/2021      Echocardiogram 3/15/2023 [facility]  Final Conclusion Previous Study: 09/23/2020    Visually Estimated EF: 55-60%    Normal left ventricular size. Normal left ventricular systolic function.    The right ventricle is normal in size and function.    Limited visualization.  Probably trileaflet aortic valve.  Aortic valve sclerosis without   significant stenosis. Mild aortic regurgitation.    There is trace tricuspid regurgitation. Estimated pulmonary artery pressure of 22.7 mmHg + RA   pressure.    Mildly dilated ascending aorta. 3.9 cm.    IVC not well visualized.    No significant change compared to the prior study.       Assessment and Plan:   Patient is being seen today to establish care for mild aortic regurgitation detected by echocardiogram 3 years ago.  On most recent echocardiogram on 3/15 she was also reported to have mildly dilated ascending aorta at 3.9 cm and unchanged mild AI.    # Severe hyperlipidemia ( on 5/10/2023)  -She is planning to have new lab tests with her primary care physician soon.  -She is a former smoker.  Recommended CT calcium screening.  She works at [employer].  She is going to obtain CT calcium screen at her workplace.    #  Mild AI  # Mildly dilated ascending aorta at 3.9 cm  -Recheck echocardiogram    # Hypertension, not well-controlled  -Patient on amlodipine 5 mg, atenolol 50 mg, chlorthalidone 25 mg.  -Increase amlodipine to 10 mg daily  -Discussed lifestyle changes including losing weight and cutting down on salt    Return to clinic 3 months to recheck blood pressure control and discuss test results.    Total time spent today for this visit is 39 minutes including precharting, face-to-face clinic visit, review of labs/imaging and medical documentation.    Everardo SAMS MD  [facility] Division of Cardiology  Pager [phone]

## 2023-12-08 ENCOUNTER — OFFICE VISIT (OUTPATIENT)
Dept: CARDIOLOGY | Facility: CLINIC | Age: 58
End: 2023-12-08
Payer: COMMERCIAL

## 2023-12-08 VITALS
HEART RATE: 60 BPM | HEIGHT: 68 IN | OXYGEN SATURATION: 98 % | WEIGHT: 239 LBS | DIASTOLIC BLOOD PRESSURE: 97 MMHG | BODY MASS INDEX: 36.22 KG/M2 | SYSTOLIC BLOOD PRESSURE: 166 MMHG

## 2023-12-08 DIAGNOSIS — I35.1 NONRHEUMATIC AORTIC VALVE INSUFFICIENCY: Primary | ICD-10-CM

## 2023-12-08 DIAGNOSIS — I10 HYPERTENSION GOAL BP (BLOOD PRESSURE) < 140/90: ICD-10-CM

## 2023-12-08 PROCEDURE — 99204 OFFICE O/P NEW MOD 45 MIN: CPT | Performed by: INTERNAL MEDICINE

## 2023-12-08 RX ORDER — AMLODIPINE BESYLATE 10 MG/1
10 TABLET ORAL DAILY
Qty: 90 TABLET | Refills: 3 | Status: SHIPPED | OUTPATIENT
Start: 2023-12-08

## 2023-12-08 RX ORDER — ROSUVASTATIN CALCIUM 20 MG/1
20 TABLET, COATED ORAL DAILY
COMMUNITY
Start: 2023-03-22 | End: 2024-01-16

## 2023-12-08 RX ORDER — POTASSIUM CHLORIDE 1500 MG/1
TABLET, EXTENDED RELEASE ORAL
COMMUNITY

## 2023-12-08 NOTE — NURSING NOTE
PLEASE READ YOUR DISCHARGE INSTRUCTIONS ENTIRELY AS IT CONTAINS IMPORTANT INFORMATION.    Patient had covid testing done today.    Discussed corona virus precautions and reviewed [facility] FAC; printed a copy for patient.  I discussed to continue to monitor their symptoms. Discussed that if their symptoms persist or worsen to seek re-evaluation. Clinic vs. ER precautions were given.  Patient verbalized understanding and agreed with the entire plan of care.    If Negative and no direct exposure: symptom free without fever reducing meds in 24 hours - can go back to work in 24 hours with surgical mask for 10-14 days.    - Reviewed radiographs and all diagnostic testing with patient/family.    - Rest.  Drink plenty of fluids.    - Tylenol OR anti-inflammatory (NSAIDs, ibuprofen, aleve, motrin) as directed as needed for fever/pain.  For Tylenol, do not exceed 3000 mg/ day. If no contraindication or allergies.  -OK to supplement with OTC DayQuil, NyQuil or TheraFlu every 6 hours as needed for cough and congestion.  Use caution of total amount of Tylenol/acetaminophen per day.    - continue albuterol inhaler as needed for shortness of breath/wheezing  - take Tessalon as needed for cough suppression.   - If you were prescribed antibiotics, please take them to completion. Please supplement with OTC probiotics and yogurt.  Contact clinic if develop profuse diarrhea and weakness.      -Below are suggestions for symptomatic relief:              -Salt water gargles to soothe throat pain.              -Chloroseptic spray also helps to numb throat pain. Drink hot tea with honey or lemon to soothe your throat.              -Nasal saline spray reduces inflammation and dryness.              -Warm face compresses to help with facial sinus pain/pressure.              -Vicks vapor rub at night.                **may also supplement with 2nd OTC nasal spray to help with inflammation and congestion.   Wean to off when you nose becomes to dry  "Chief Complaint   Patient presents with    New Patient     Mitral valve insufficiency       Initial BP (!) 146/100   Pulse 64   Ht 1.727 m (5' 8\")   Wt 108.4 kg (239 lb)   SpO2 98%   BMI 36.34 kg/m   Estimated body mass index is 36.34 kg/m  as calculated from the following:    Height as of this encounter: 1.727 m (5' 8\").    Weight as of this encounter: 108.4 kg (239 lb)..  BP completed using cuff size: matthew Wang CMA (AAMA)    " or bleed. Also use nasal saline twice a day to help with dryness.               -Flonase OTC or Nasacort OTC  once or twice a day for nasal/sinus congestion. DON'T USE IF YOU HAVE GLAUCOMA. CHECK WITH YOUR PHARMACIST/PHYSICIAN.              -Simple foods like chicken noodle soup.              -Mucinex DM (ANY COUGH EXPECTORANT-- guaifenesin) for cough or chest congestion with mucus and (ANY COUGH SUPPRESSANT- dextromethorphan) helps with coughing every 12 hours. Mucinex-DM if you have chest congestion or sputum (caution if history of high blood pressure or palpitations).              -Zyrtec/Claritin/xyzal during the day time  & Benadryl at night (only if severe runny nose) may help with allergies and runny nose. Add decongestant if you have nasal/sinus congestion/sinus pressure/ear fullness sensation. (see below)              -may take OTC meclizine as needed for dizziness or nausea.     Caution with use of Decongestant meds:  -If you DO NOT have Hypertension or any history of palpitations, it is ok to take over the counter Sudafed or Mucinex D or Allegra-D or Claritin-D or Zyrtec-D.  -If you do take one of the above, it is ok to combine that with plain over the counter Mucinex or Allegra or Claritin or Zyrtec. If, for example, you are taking Zyrtec -D, you can combine that with Mucinex, but not Mucinex-D.  If you are taking Mucinex-D, you can combine that with plain Allegra or Claritin or Zyrtec.     -Do not combine pseudophed or phenylephrine with any other brand allergy-D for DECONGESTANT.   -Or vice versa, you can you take plain allergy medications (allegra/claritin/zyrtec with NO Decongestant) and ADD OTC pseudophed or phenelyphrine 3 times a day (or every 4-6 hours needed). Avoid taking decongestant late at night or with caffeine as it can keep you up or cause jittery feeling.     -If you DO have Hypertension , anxiety, or palpitations, it is safe to take Coricidin HBP for relief of cough, congestion, or  sinus symptoms every 4-6 hours.      For your GI symptoms:  -Use gatorade/pedialyte or rehydration packets to help stay hydrated. Vitamin water and plain water do not contain rehydrating electrolytes.  -Increase clear liquids (water, gatorade, pedialyte, broths, jello, etc) Hold off on solids for 12-18 hours. Then advance to BRAT diet (banana, rice, applesauce, tea, toast/crackers), then advance further as tolerated. Avoid spicy or fatty foods.         -Please go to the ER if you experience worsening abdominal pain, blood in your vomit or stool, high fever, dizziness, fainting, swelling of your abdomen, inability to pass gas or stool, or inability to urinate.     -You must understand that you've received an Urgent Care treatment only and that you may be released before all your medical problems are known or treated. You, the patient, will arrange for follow up care as instructed. Please arrange follow up with your primary medical clinic within 2-5 days if your signs and symptoms have not resolved or worsen.     - Follow up with your PCP or specialty clinic as directed.  You can call [phone] or [phone] to schedule an appointment with the appropriate provider.  Schedule CENTER is open Mon-Friday 8-5pm (excluded holidays).    - If your condition worsens or fails to improve we recommend that you receive another evaluation at the emergency room immediately or contact your primary medical clinic to discuss your concerns.

## 2023-12-08 NOTE — LETTER
12/8/2023      RE: Pam Mora  [id number] [facility] [address]       Dear Colleague,    Thank you for the opportunity to participate in the care of your patient, Pam Mora, at the [facility]. Please see a copy of my visit note below.                                                                                              General Cardiology [facility]-[place]      Referring provider: Self-referred    HPI: Ms. Pam Mora is a 58 year old  female with PMH significant for    -Hypertension  -Morbid obesity  -Severe persistent asthma  -Elevated liver enzymes  -GERD  -Severe hyperlipidemia ( on 5/10/2023, patient not on statin)  -Former smoker (25 pack years, quit at the age of 48)    Patient is transferring her care from outside hospital.  She has been following with cardiology for mild aortic regurgitation.  She was recently found to have mildly dilated ascending aorta.    Patient reports dull ache in the middle of the chest for months.  She tells me that she had this all her life.  She works out and does not report chest discomfort with exercise.  Occasional palpitations.  No shortness of breath.  No lower extremity edema.      Patient underwent transthoracic echocardiogram at [facility] in March of this year which showed normal biventricular function, trileaflet aortic valve, aortic valve sclerosis and mild AI.  Ascending aorta reported at 3.9 cm.  Prior echo in 2020 showed similar findings except ascending aorta was 3.5 cm.    Current cardiac medications  Chlorthalidone 25 mg  Amlodipine 5 mg  Atenolol 50 mg daily    Patient is also on amitriptyline, inhalers and Symbicort.    Medications, personal, family, and social history reviewed with patient and revised.    PAST MEDICAL HISTORY:  Past Medical History:   Diagnosis Date    AR (allergic rhinitis)     Asthma     Persistant, mild     Elevated fasting glucose     GERD (gastroesophageal reflux disease)     High cholesterol     HTN (hypertension)     Incidental lung nodule 05/07    LSIL (low grade squamous intraepithelial lesion) on Pap smear     Migraine     Obesity (BMI 30-39.9)        CURRENT MEDICATIONS:  Current Outpatient Medications   Medication Sig Dispense Refill    albuterol (VENTOLIN HFA) 108 (90 Base) MCG/ACT inhaler INHALE 2 PUFFS BY MOUTH EVERY 4 HOURS AS NEEDED FOR SHORTNESS OF BREATH OR WHEEZING 18 g 3    amitriptyline (ELAVIL) 25 MG tablet Take 1 tablet (25 mg) by mouth At Bedtime 30 tablet 2    amLODIPine (NORVASC) 5 MG tablet Take 1 tablet (5 mg) by mouth daily 90 tablet 1    atenolol (TENORMIN) 50 MG tablet Take 1 tablet (50 mg) by mouth daily 90 tablet 1    benzonatate (TESSALON) 200 MG capsule Take 1 capsule (200 mg) by mouth 3 times daily as needed for cough (Patient not taking: Reported on 5/10/2023) 30 capsule 0    budesonide-formoterol (SYMBICORT) 160-4.5 MCG/ACT Inhaler Inhale 2 puffs into the lungs 2 times daily 10.2 g 11    calcipotriene (DOVONOX) 0.005 % external cream Mix with fluorouracil and apply BID to affected areas for 5-10 days until desired result is achieved. Wash hands after applying. Avoid sun 60 g 1    chlorthalidone (HYGROTON) 25 MG tablet Take 1 tablet (25 mg) by mouth daily 90 tablet 1    clindamycin (CLEOCIN T) 1 % external lotion Apply topically 2 times daily (Patient not taking: Reported on 7/14/2023) 60 mL 0    fluorouracil (EFUDEX) 5 % external cream Mix with calcipotriene and apply BID to affected areas for 5-10 days. 40 g 1    ipratropium - albuterol 0.5 mg/2.5 mg/3 mL (DUONEB) 0.5-2.5 (3) MG/3ML neb solution Take 1 vial (3 mLs) by nebulization every 6 hours as needed for shortness of breath / dyspnea or wheezing (Patient not taking: Reported on 5/10/2023) 90 mL 0    MULTI-VITAMIN OR TABS 1 TABLET DAILY      polyethylene glycol (MIRALAX) 17 GM/Dose powder Take 17 g (1 capful) by mouth daily  "(Patient not taking: Reported on 5/10/2023) 507 g 1    promethazine-DM (PHENERGAN-DM) 6.25-15 MG/5ML syrup Take 5 mLs by mouth every 4 hours as needed for cough (Patient not taking: Reported on 5/10/2023) 118 mL 0       PAST SURGICAL HISTORY:  Past Surgical History:   Procedure Laterality Date    BIOPSY      THYROID , UTERUS, CERVIX    NO HISTORY OF SURGERY      Dated 10/12/10       ALLERGIES:     Allergies   Allergen Reactions    Polytrim [Polymyxin B-Trimethoprim]     Sulfa Drugs [Sulfa Antibiotics] Hives       FAMILY HISTORY:  Family History   Problem Relation Age of Onset    Neurologic Disorder Maternal Grandfather         parkinsons    Diabetes Paternal Grandfather          SOCIAL HISTORY:  Social History     Tobacco Use    Smoking status: Former     Packs/day: 0.00     Years: 0.00     Additional pack years: 0.00     Total pack years: 0.00     Types: Cigarettes     Quit date: 1/6/2008     Years since quitting: 15.9    Smokeless tobacco: Never   Vaping Use    Vaping Use: Never used   Substance Use Topics    Alcohol use: Yes     Comment: 3 drinks a week    Drug use: No       ROS:   Constitutional: No fever, chills, or sweats. Weight stable.   Cardiovascular: As per HPI.       Exam:  BP (!) 166/97   Pulse 60   Ht 1.727 m (5' 8\")   Wt 108.4 kg (239 lb)   SpO2 98%   BMI 36.34 kg/m    GENERAL APPEARANCE: alert and no distress  HEENT: no icterus, no central cyanosis  LYMPH/NECK: no adenopathy, no asymmetry, JVP not elevated, no carotid bruits.  RESPIRATORY: lungs clear to auscultation - no rales, rhonchi or wheezes, no use of accessory muscles, no retractions, respirations are unlabored, normal respiratory rate  CARDIOVASCULAR: regular rhythm, normal S1, S2, no S3 or S4 and no murmur, click or rub, precordium quiet with normal PMI.  GI: soft, non tender  EXTREMITIES: no edema  NEURO: alert, normal speech,and affect  SKIN: no ecchymoses, no rashes     I have reviewed the labs and personally reviewed the imaging " below and made my comment in the assessment and plan.    Labs:  CBC RESULTS:   Lab Results   Component Value Date    WBC 7.3 11/21/2022    WBC 5.2 03/07/2020    RBC 5.06 11/21/2022    RBC 4.84 03/07/2020    HGB 16.7 (H) 11/21/2022    HGB 15.4 03/07/2020    HCT 47.9 (H) 11/21/2022    HCT 45.4 03/07/2020    MCV 95 11/21/2022    MCV 94 03/07/2020    MCH 33.0 11/21/2022    MCH 31.8 03/07/2020    MCHC 34.9 11/21/2022    MCHC 33.9 03/07/2020    RDW 12.9 11/21/2022    RDW 12.8 03/07/2020     11/21/2022     03/07/2020       BMP RESULTS:  Lab Results   Component Value Date     05/10/2023     06/04/2021    POTASSIUM 3.8 05/10/2023    POTASSIUM 3.8 06/04/2021    CHLORIDE 98 05/10/2023    CHLORIDE 102 06/04/2021    CO2 32 05/10/2023    CO2 32 06/04/2021    ANIONGAP 7 05/10/2023    ANIONGAP 5 06/04/2021     (H) 05/10/2023     (H) 06/04/2021    BUN 14 05/10/2023    BUN 15 06/04/2021    CR 0.74 05/10/2023    CR 0.74 06/04/2021    GFRESTIMATED >90 05/10/2023    GFRESTIMATED >90 06/04/2021    GFRESTBLACK >90 06/04/2021    BRIELLE 9.3 05/10/2023    BRIELLE 9.5 06/04/2021      Echocardiogram 3/15/2023 [facility]  Final Conclusion Previous Study: 09/23/2020    Visually Estimated EF: 55-60%    Normal left ventricular size. Normal left ventricular systolic function.    The right ventricle is normal in size and function.    Limited visualization.  Probably trileaflet aortic valve.  Aortic valve sclerosis without   significant stenosis. Mild aortic regurgitation.    There is trace tricuspid regurgitation. Estimated pulmonary artery pressure of 22.7 mmHg + RA   pressure.    Mildly dilated ascending aorta. 3.9 cm.    IVC not well visualized.    No significant change compared to the prior study.       Assessment and Plan:   Patient is being seen today to establish care for mild aortic regurgitation detected by echocardiogram 3 years ago.  On most recent echocardiogram on 3/15 she was also reported to have  mildly dilated ascending aorta at 3.9 cm and unchanged mild AI.    # Severe hyperlipidemia ( on 5/10/2023)  -She is planning to have new lab tests with her primary care physician soon.  -She is a former smoker.  Recommended CT calcium screening.  She works at [employer].  She is going to obtain CT calcium screen at her workplace.    # Mild AI  # Mildly dilated ascending aorta at 3.9 cm  -Recheck echocardiogram    # Hypertension, not well-controlled  -Patient on amlodipine 5 mg, atenolol 50 mg, chlorthalidone 25 mg.  -Increase amlodipine to 10 mg daily  -Discussed lifestyle changes including losing weight and cutting down on salt    Return to clinic 3 months to recheck blood pressure control and discuss test results.    Total time spent today for this visit is 39 minutes including precharting, face-to-face clinic visit, review of labs/imaging and medical documentation.    Everardo SAMS MD  [facility] Division of Cardiology  Pager [phone]

## 2023-12-08 NOTE — PATIENT INSTRUCTIONS
Thank you for coming to the [facility] at [place]; please note the following instructions:    1. Increase your amlodipine to 10 mg daily    2. Monitor your blood pressure at home. Your goal should be less than 130/80. Please let us know if your blood pressure continues to be higher than this after 1 month.     3. CT Calcium Score Exam. This exam is $105 out of pocket and insurance may cover the exam. Please call your insurance prior to your exam and let the  know if the insurance will be paying or not. If your insurance is covering it, and you have already paid the $105, you will be reimbursed. Please do not consume any caffeine 8 hours prior to your test. Locations for this exam include [place], [facility], or [place]. Please call [phone] to schedule. You can also get this done at your place of employment. Please send us a copy of the report when completed.    4. Cardiology Providers: Dr. Everardo Elias has ordered an echocardiogram to be performed in December/January.      Echocardiogram Instructions:  -Wear comfortable clothing  -Refrain from wearing perfumes or scented lotions    Please also call your insurance company for any billing questions regarding the echocardiogram.    5. Follow up with Dr. Elias in 3 months.           If you have any questions regarding your visit, please contact your care team:     CARDIOLOGY  TELEPHONE NUMBER   Tete MATOSAndrade, Registered Nurse  Maura MANLEY, Registered Nurse  Mag PRABHAKAR, Registered Nurse  Татьяна FORREST, Registered Medical Assistant  Debbie SOUTH, Certified Medical Assistant  Negra CAMPOS, Visit Facilitator [phone] (select option 1)    *After hours: [phone]   For Scheduling Appts:     [phone] (select option 1)    *After hours: [phone]   For the Device Clinic (Pacemakers and ICD's)  Alejandra YEN, Registered Nurse   During business hours: [phone]    *After business hours:  [phone] (select option 4)      Normal  test result notifications will be released via [employer] or mailed within 7 business days.  All other test results, will be communicated via telephone once reviewed by your cardiologist.    If you need a medication refill, please contact your pharmacy.  Please allow 3 business days for your refill to be completed.    As always, thank you for trusting us with your health care needs!

## 2023-12-21 NOTE — TELEPHONE ENCOUNTER
Pt canceled the excision appt.     I called and left pt a vm to call back and reschedule.     Meera Varghese, Procedure  12/21/2023 12:15 PM

## 2023-12-28 ENCOUNTER — TRANSFERRED RECORDS (OUTPATIENT)
Dept: HEALTH INFORMATION MANAGEMENT | Facility: CLINIC | Age: 58
End: 2023-12-28

## 2023-12-29 ENCOUNTER — ANCILLARY PROCEDURE (OUTPATIENT)
Dept: CARDIOLOGY | Facility: CLINIC | Age: 58
End: 2023-12-29
Attending: INTERNAL MEDICINE
Payer: COMMERCIAL

## 2023-12-29 DIAGNOSIS — I35.1 NONRHEUMATIC AORTIC VALVE INSUFFICIENCY: ICD-10-CM

## 2023-12-29 LAB — LVEF ECHO: NORMAL

## 2023-12-29 PROCEDURE — 93306 TTE W/DOPPLER COMPLETE: CPT | Performed by: INTERNAL MEDICINE

## 2024-01-03 ENCOUNTER — PATIENT OUTREACH (OUTPATIENT)
Dept: ONCOLOGY | Facility: CLINIC | Age: 59
End: 2024-01-03
Payer: COMMERCIAL

## 2024-01-03 ENCOUNTER — TELEPHONE (OUTPATIENT)
Dept: CARDIOLOGY | Facility: CLINIC | Age: 59
End: 2024-01-03
Payer: COMMERCIAL

## 2024-01-03 ENCOUNTER — PRE VISIT (OUTPATIENT)
Dept: ONCOLOGY | Facility: CLINIC | Age: 59
End: 2024-01-03
Payer: COMMERCIAL

## 2024-01-03 DIAGNOSIS — R91.8 PULMONARY NODULES: Primary | ICD-10-CM

## 2024-01-03 DIAGNOSIS — R91.1 INCIDENTAL LUNG NODULE: Primary | ICD-10-CM

## 2024-01-03 NOTE — TELEPHONE ENCOUNTER
Health Call Center    Phone Message    May a detailed message be left on voicemail: yes     Reason for Call: Other: pt recently had a Calcium Score  at [facility] in [place] and something was found that concerns the pt.  She would like Dr. Elias to put in an order for PET scan?  Please call pt w/any questions or concerns and to review the Scan.     Action Taken: Message routed to:  [facility] ([facility] cardio    Travel Screening: Not Applicable    Thank you!  [facility]

## 2024-01-03 NOTE — TELEPHONE ENCOUNTER
Referral placed. Patient informed and verbalized understanding.    CT Scan sent to abstracting and also faxed to pulmonary [phone].      Tete Brownlee RN  Cardiology Care Coordinator  [facility]  [phone] option 1

## 2024-01-03 NOTE — TELEPHONE ENCOUNTER
Dr. Elias, results are on your desk to review and advise.    Tete Brownlee, RN  Cardiology Care Coordinator  [facility]  [phone] option 1

## 2024-01-03 NOTE — TELEPHONE ENCOUNTER
Spoke to patient.  Pt stating that the CT calcium scan was completed externally.  There is an abnormal detection in the lungs.  She is requesting a PET scan.      Writer discussed that Dr. Elias needs to review the results to determine next step.  Writer states she will call [facility] to have them fax the results over for review.    Once results are reviewed, will communicate with patient.  Patient verbalized understanding.    Tete Brownlee RN  Cardiology Care Coordinator  [facility]  [phone] option 1

## 2024-01-03 NOTE — TELEPHONE ENCOUNTER
We will refer her to our lung nodule colleagues and they will order all the necessary tests.  DR SAMS

## 2024-01-03 NOTE — PROGRESS NOTES
New IP (Interventional Pulmonology) referral rec'd.  Chart reviewed.         New Patient: Interventional Pulmonary (Lung nodule) Nurse Navigator Note    Referring provider: [facility]as MDFk [facility]    Referred to (specialty): Interventional Pulmonary (Lung nodule)    Requested provider (if applicable): n/a    Date Referral Received: 1/3/2024    Evaluation for :  Lung nodule--lung nodules detected on CT Cardiac Calcium Scan measuring up to 1.9 cm noncalcified    Clinical History (per Nurse review of records provided):    **BOOK MARKED**  ~report currently unavailable~    Records Location: [facility]    RECORDS NEEDED:  Last FIVE years CHEST imaging pushed to PACS from [facility]----thank you!!       Additional testing needed prior to consult: PFT's

## 2024-01-04 PROBLEM — D12.4 BENIGN NEOPLASM OF DESCENDING COLON: Status: ACTIVE | Noted: 2021-09-15

## 2024-01-04 PROBLEM — R14.0 ABDOMINAL DISTENSION, GASEOUS: Status: ACTIVE | Noted: 2022-11-11

## 2024-01-04 PROBLEM — I35.1 NONRHEUMATIC AORTIC VALVE INSUFFICIENCY: Chronic | Status: ACTIVE | Noted: 2023-03-22

## 2024-01-04 PROBLEM — R14.0 ABDOMINAL BLOATING: Status: ACTIVE | Noted: 2024-01-04

## 2024-01-04 PROBLEM — K63.5 POLYP OF COLON: Status: ACTIVE | Noted: 2021-09-13

## 2024-01-04 PROBLEM — I34.0 MITRAL VALVE INSUFFICIENCY: Status: ACTIVE | Noted: 2023-03-22

## 2024-01-09 ENCOUNTER — ANCILLARY PROCEDURE (OUTPATIENT)
Dept: CT IMAGING | Facility: CLINIC | Age: 59
End: 2024-01-09
Attending: STUDENT IN AN ORGANIZED HEALTH CARE EDUCATION/TRAINING PROGRAM
Payer: COMMERCIAL

## 2024-01-09 DIAGNOSIS — R91.1 INCIDENTAL LUNG NODULE: ICD-10-CM

## 2024-01-09 PROCEDURE — 71250 CT THORAX DX C-: CPT | Mod: TC | Performed by: RADIOLOGY

## 2024-01-10 NOTE — TELEPHONE ENCOUNTER
See message below from Dr Farrar:   Nicki Farrar, SONIA CNP  You36 minutes ago (11:53 AM)     EK February is fine. Thank you for checking       Georgiana Monge RN on 1/10/2024 at 12:32 PM

## 2024-01-10 NOTE — TELEPHONE ENCOUNTER
Called and spoke to pt. Pt requested a late afternoon in late February due to scheduling conflicts. Scheduled for 02/22 in  with Dr. Taylor.     Sending as a FYI because this procedure has been delayed and will not be completed in the recommended time frame.     Meera Varghese, Procedure  1/10/2024 10:47 AM

## 2024-01-16 ENCOUNTER — VIRTUAL VISIT (OUTPATIENT)
Dept: PULMONOLOGY | Facility: CLINIC | Age: 59
End: 2024-01-16
Attending: INTERNAL MEDICINE
Payer: COMMERCIAL

## 2024-01-16 VITALS
SYSTOLIC BLOOD PRESSURE: 117 MMHG | DIASTOLIC BLOOD PRESSURE: 78 MMHG | WEIGHT: 229 LBS | HEIGHT: 68 IN | BODY MASS INDEX: 34.71 KG/M2

## 2024-01-16 DIAGNOSIS — R91.8 PULMONARY NODULES: ICD-10-CM

## 2024-01-16 PROCEDURE — 99204 OFFICE O/P NEW MOD 45 MIN: CPT | Mod: 95 | Performed by: INTERNAL MEDICINE

## 2024-01-16 ASSESSMENT — PAIN SCALES - GENERAL: PAINLEVEL: NO PAIN (0)

## 2024-01-16 NOTE — LETTER
1/16/2024       RE: Pam Mora  [address] [facility] [address]     Dear Colleague,    Thank you for referring your patient, Pam Mora, [facility]. Please see a copy of my visit note below.      LUNG NODULE & INTERVENTIONAL PULMONARY CLINIC  CLINICS & SURGERY CENTER, [facility]   VIDEO VISIT    Pam Mora MRN# [id number]   Age: 58 year old YOB: 1965     Reason for Consultation: Lung Nodule    Requesting Physician: Everardo Elias MD  [address] [facility] [address]  [place] [address]        Assessment and Plan:    1. New solitary pulmonary lung nodule(s). Given the characteristics on current/previous imaging and risk factors; I would classify this to be Intermediate (6-65%) risk for cancer. Overall, there is decrease in size in the last month in my opinion. Looks infectious/inflammatory. Repeat CT in 3mo    Billing: I spent a total of 45min spent on date of encounter which includes prep time, visit with the patient and post visit work including documentation and nursing communication     Jhonathan Hyatt MD, MHA  Associate Professor of Medicine  Section of Interventional Pulmonology   Division of Pulmonary, Allergy, Critical Care and Sleep Medicine   [facility]  Pager: [phone]   Office: [phone]  Email: [email or web address]    Yolanda Palafox RN   Interventional Pulmonary Care Coordinator   Office: [phone]  Email: [email or web address]     Willie Collier  Interventional Pulmonary Surgery Scheduler   Office: [phone]  Email: [email or web address]         History:     Pam Mora is a 58 year old female with sig h/o for asthma, GERD, HTN, obesity who is here for evaluation/followup of Lung Nodule.    - No new resp sx or complaints. Denies dyspnea or cough.   - Ct chest  demonstrating RUL nodule 1mo ago. Here for evaluation  - Personal hx of cancer: no  - Family hx of cancer: no lung cacner   - Exposure hx: Denies asbestos or radon exposure   - Tobacco hx: Never Smoker.   - My interpretation of the images relevant for this visit includes: nodule   - My interpretation of the PFT's relevant for this visit includes: None     Culprit Nodule(s):   1: Right upper lobe nodule and is 14 mm in size/severity and non-calcified in morphology/quality. First seen by chest CT on 23. There is a decrease in size.    Other active medical problems include:   - has GERD, HTN, obesity. stable            Past Medical History:      Past Medical History:   Diagnosis Date    AR (allergic rhinitis)     Asthma     Persistant, mild    Elevated fasting glucose     GERD (gastroesophageal reflux disease)     High cholesterol     HTN (hypertension)     Incidental lung nodule     LSIL (low grade squamous intraepithelial lesion) on Pap smear     Migraine     Obesity (BMI 30-39.9)              Past Surgical History:      Past Surgical History:   Procedure Laterality Date    BIOPSY      THYROID , UTERUS, CERVIX    NO HISTORY OF SURGERY      Dated 10/12/10            Social History:     Social History     Tobacco Use    Smoking status: Former     Packs/day: 0.00     Years: 0.00     Additional pack years: 0.00     Total pack years: 0.00     Types: Cigarettes     Quit date: 2008     Years since quittin.0    Smokeless tobacco: Never   Substance Use Topics    Alcohol use: Yes     Comment: 3 drinks a week            Family History:     Family History   Problem Relation Age of Onset    Neurologic Disorder Maternal Grandfather         parkinsons    Diabetes Paternal Grandfather              Allergies:      Allergies   Allergen Reactions    Sulfa Drugs [Sulfa Antibiotics] Hives    Polytrim [Polymyxin B-Trimethoprim]             Medications:     Current Outpatient Medications   Medication Sig    albuterol  (VENTOLIN HFA) 108 (90 Base) MCG/ACT inhaler INHALE 2 PUFFS BY MOUTH EVERY 4 HOURS AS NEEDED FOR SHORTNESS OF BREATH OR WHEEZING    amitriptyline (ELAVIL) 25 MG tablet Take 1 tablet (25 mg) by mouth At Bedtime    amLODIPine (NORVASC) 10 MG tablet Take 1 tablet (10 mg) by mouth daily    atenolol (TENORMIN) 50 MG tablet Take 1 tablet (50 mg) by mouth daily    budesonide-formoterol (SYMBICORT) 160-4.5 MCG/ACT Inhaler Inhale 2 puffs into the lungs 2 times daily    calcipotriene (DOVONOX) 0.005 % external cream Mix with fluorouracil and apply BID to affected areas for 5-10 days until desired result is achieved. Wash hands after applying. Avoid sun    chlorthalidone (HYGROTON) 25 MG tablet Take 1 tablet (25 mg) by mouth daily    fluorouracil (EFUDEX) 5 % external cream Mix with calcipotriene and apply BID to affected areas for 5-10 days.    MULTI-VITAMIN OR TABS 1 TABLET DAILY    potassium chloride ER (KLOR-CON M) 20 MEQ CR tablet TAKE 1 TABLET BY MOUTH ONCE DAILY WITH A MEAL.    rosuvastatin (CRESTOR) 20 MG tablet Take 20 mg by mouth daily     No current facility-administered medications for this visit.            Review of Systems:     12-point ROS reviewed and abnormalities stated in the history.         Physical Exam:     Constitutional - looks well, in no apparent distress  Eyes - no redness or discharge  Respiratory -breathing appears comfortable.  No cough.  Skin - No appreciable discoloration or lesions (very limited exam)  Neurological - No apparent tremors. Speech fluent and articlate  Psychiatric - no signs of delirium or anxiety     Exam limited to that easily identified on a virtual visit. The rest of a comprehensive physical examination is deferred due to PHE (public health emergency) video visit restrictions.         Current Laboratory Data:   All laboratory and imaging data reviewed.           No data to display                              Again, thank you for allowing me to participate in the care of  your patient.      Sincerely,    Jhonathan Hyatt MD

## 2024-01-16 NOTE — PROGRESS NOTES
"Virtual Visit Details    Type of service:  Video Visit     See note     LUNG NODULE & INTERVENTIONAL PULMONARY CLINIC  CLINICS & SURGERY CENTER, [facility], [facility]   VIDEO VISIT    Pam Mora MRN# [id number]   Age: 58 year old YOB: 1965     Reason for Consultation: Lung Nodule    Requesting Physician: Everardo Elias MD  [address] [facility] [address]  [place] [address]       The patient has been notified of following:   \"This video visit will be conducted via a call between you and your physician/provider. We have found that certain health care needs can be provided without the need for an in-person physical exam.  This service lets us provide the care you need with a video conversation.  If a prescription is necessary we can send it directly to your pharmacy.  If lab work is needed we can place an order for that and you can then stop by our lab to have the test done at a later time.  Video visits are billed at different rates depending on your insurance coverage.  Please reach out to your insurance provider with any questions.  If during the course of the call the physician/provider feels a video visit is not appropriate, you will not be charged for this service.\"  Patient has given verbal consent for Video visit? Yes  How would you like to obtain your AVS? Please refer to rooming staff note  Patient would like the video invitation sent by: Please refer to rooming staff note   Will anyone else be joining your video visit? Please refer to rooming staff note       Video-Visit Details     Type of service:  Video Visit  Video Start Time: 300  Video End Time: 310  Provider Name: Jhonathan Hyatt MD, A   Originating Location (pt. Location): Home  Provider Location: Off campus   Distant Location (provider location): Home/Clinic  Platform used for Video Visit: AmWell/Doximity    Assessment and Plan:    1. New solitary pulmonary lung nodule(s). Given the characteristics on " current/previous imaging and risk factors; I would classify this to be Intermediate (6-65%) risk for cancer. Overall, there is decrease in size in the last month in my opinion. Looks infectious/inflammatory. Repeat CT in 3mo    Billing: I spent a total of 45min spent on date of encounter which includes prep time, visit with the patient and post visit work including documentation and nursing communication     Jhonathan Hyatt MD, MHA  Associate Professor of Medicine  Section of Interventional Pulmonology   Division of Pulmonary, Allergy, Critical Care and Sleep Medicine   [facility]  Pager: [phone]   Office: [phone]  Email: [email or web address]    Yolanda Palafox RN   Interventional Pulmonary Care Coordinator   Office: [phone]  Email: [email or web address]     Willie Collier  Interventional Pulmonary Surgery Scheduler   Office: [phone]  Email: [email or web address]         History:     Pam Mora is a 58 year old female with sig h/o for asthma, GERD, HTN, obesity who is here for evaluation/followup of Lung Nodule.    - No new resp sx or complaints. Denies dyspnea or cough.   - Ct chest demonstrating RUL nodule 1mo ago. Here for evaluation  - Personal hx of cancer: no  - Family hx of cancer: no lung cacner   - Exposure hx: Denies asbestos or radon exposure   - Tobacco hx: Never Smoker.   - My interpretation of the images relevant for this visit includes: nodule   - My interpretation of the PFT's relevant for this visit includes: None     Culprit Nodule(s):   1: Right upper lobe nodule and is 14 mm in size/severity and non-calcified in morphology/quality. First seen by chest CT on 12/28/23. There is a decrease in size.    Other active medical problems include:   - has GERD, HTN, obesity. stable            Past Medical History:      Past Medical History:   Diagnosis Date    AR (allergic rhinitis)     Asthma     Persistant, mild    Elevated fasting  glucose     GERD (gastroesophageal reflux disease)     High cholesterol     HTN (hypertension)     Incidental lung nodule     LSIL (low grade squamous intraepithelial lesion) on Pap smear     Migraine     Obesity (BMI 30-39.9)              Past Surgical History:      Past Surgical History:   Procedure Laterality Date    BIOPSY      THYROID , UTERUS, CERVIX    NO HISTORY OF SURGERY      Dated 10/12/10            Social History:     Social History     Tobacco Use    Smoking status: Former     Packs/day: 0.00     Years: 0.00     Additional pack years: 0.00     Total pack years: 0.00     Types: Cigarettes     Quit date: 2008     Years since quittin.0    Smokeless tobacco: Never   Substance Use Topics    Alcohol use: Yes     Comment: 3 drinks a week            Family History:     Family History   Problem Relation Age of Onset    Neurologic Disorder Maternal Grandfather         parkinsons    Diabetes Paternal Grandfather              Allergies:      Allergies   Allergen Reactions    Sulfa Drugs [Sulfa Antibiotics] Hives    Polytrim [Polymyxin B-Trimethoprim]             Medications:     Current Outpatient Medications   Medication Sig    albuterol (VENTOLIN HFA) 108 (90 Base) MCG/ACT inhaler INHALE 2 PUFFS BY MOUTH EVERY 4 HOURS AS NEEDED FOR SHORTNESS OF BREATH OR WHEEZING    amitriptyline (ELAVIL) 25 MG tablet Take 1 tablet (25 mg) by mouth At Bedtime    amLODIPine (NORVASC) 10 MG tablet Take 1 tablet (10 mg) by mouth daily    atenolol (TENORMIN) 50 MG tablet Take 1 tablet (50 mg) by mouth daily    budesonide-formoterol (SYMBICORT) 160-4.5 MCG/ACT Inhaler Inhale 2 puffs into the lungs 2 times daily    calcipotriene (DOVONOX) 0.005 % external cream Mix with fluorouracil and apply BID to affected areas for 5-10 days until desired result is achieved. Wash hands after applying. Avoid sun    chlorthalidone (HYGROTON) 25 MG tablet Take 1 tablet (25 mg) by mouth daily    fluorouracil (EFUDEX) 5 % external cream  Mix with calcipotriene and apply BID to affected areas for 5-10 days.    MULTI-VITAMIN OR TABS 1 TABLET DAILY    potassium chloride ER (KLOR-CON M) 20 MEQ CR tablet TAKE 1 TABLET BY MOUTH ONCE DAILY WITH A MEAL.    rosuvastatin (CRESTOR) 20 MG tablet Take 20 mg by mouth daily     No current facility-administered medications for this visit.            Review of Systems:     12-point ROS reviewed and abnormalities stated in the history.         Physical Exam:     Constitutional - looks well, in no apparent distress  Eyes - no redness or discharge  Respiratory -breathing appears comfortable.  No cough.  Skin - No appreciable discoloration or lesions (very limited exam)  Neurological - No apparent tremors. Speech fluent and articlate  Psychiatric - no signs of delirium or anxiety     Exam limited to that easily identified on a virtual visit. The rest of a comprehensive physical examination is deferred due to PHE (public health emergency) video visit restrictions.         Current Laboratory Data:   All laboratory and imaging data reviewed.           No data to display

## 2024-01-16 NOTE — NURSING NOTE
Is the patient currently in the state of [place]? YES    Visit mode:VIDEO    If the visit is dropped, the patient can be reconnected by: VIDEO VISIT: Text to cell phone:   Telephone Information:   Mobile [phone]       Will anyone else be joining the visit? NO  (If patient encounters technical issues they should call [phone]    How would you like to obtain your AVS? MyChart    Are changes needed to the allergy or medication list?  Pt states her allergies and medications were up-to-date during her echeck-in.    Reason for visit: Consult    Zaira GARCIAF

## 2024-01-17 DIAGNOSIS — I10 HYPERTENSION GOAL BP (BLOOD PRESSURE) < 140/90: ICD-10-CM

## 2024-01-17 RX ORDER — CHLORTHALIDONE 25 MG/1
25 TABLET ORAL DAILY
Qty: 90 TABLET | Refills: 1 | Status: SHIPPED | OUTPATIENT
Start: 2024-01-17 | End: 2024-07-22

## 2024-01-17 RX ORDER — ATENOLOL 50 MG/1
50 TABLET ORAL DAILY
Qty: 90 TABLET | Refills: 1 | Status: SHIPPED | OUTPATIENT
Start: 2024-01-17 | End: 2024-07-22

## 2024-01-18 NOTE — TELEPHONE ENCOUNTER
I called and spoke with Pam this morning about possibly rescheduling her excision with Dr. Taylor.     Pam said that she would like to hold off on that for now. She would like to talk to her dermatologist about next steps. She mentioned her appt in March and plans to discus the excision then.     Routing to referring phy as a FYI.     Meera Varghese, Procedure  1/18/2024 9:36 AM

## 2024-03-05 ENCOUNTER — OFFICE VISIT (OUTPATIENT)
Dept: CARDIOLOGY | Facility: CLINIC | Age: 59
End: 2024-03-05
Payer: COMMERCIAL

## 2024-03-05 VITALS
OXYGEN SATURATION: 98 % | DIASTOLIC BLOOD PRESSURE: 85 MMHG | SYSTOLIC BLOOD PRESSURE: 121 MMHG | BODY MASS INDEX: 34.56 KG/M2 | HEIGHT: 68 IN | WEIGHT: 228 LBS | HEART RATE: 77 BPM

## 2024-03-05 DIAGNOSIS — R93.1 ELEVATED CORONARY ARTERY CALCIUM SCORE: Primary | ICD-10-CM

## 2024-03-05 DIAGNOSIS — I10 HYPERTENSION, WELL CONTROLLED: ICD-10-CM

## 2024-03-05 DIAGNOSIS — I25.10 CORONARY ARTERY DISEASE INVOLVING NATIVE CORONARY ARTERY OF NATIVE HEART WITHOUT ANGINA PECTORIS: ICD-10-CM

## 2024-03-05 DIAGNOSIS — E78.5 HYPERLIPIDEMIA LDL GOAL <70: ICD-10-CM

## 2024-03-05 PROCEDURE — 99215 OFFICE O/P EST HI 40 MIN: CPT | Performed by: INTERNAL MEDICINE

## 2024-03-05 RX ORDER — ASPIRIN 81 MG/1
81 TABLET, CHEWABLE ORAL DAILY
COMMUNITY
Start: 2024-03-05

## 2024-03-05 NOTE — PROGRESS NOTES
General Cardiology Clinic-[place]      Referring provider: Self-referred    HPI: Ms. Pam Mora is a 58 year old  female with PMH significant for    -Hypertension  -Morbid obesity  -Severe persistent asthma  -Elevated liver enzymes  -GERD  -Severe hyperlipidemia ( on 5/10/2023, patient not on statin)  -Former smoker (25 pack years, quit at the age of 48)    Patient is transferring her care from outside hospital.  She has been following with cardiology for mild aortic regurgitation.  She was recently found to have mildly dilated ascending aorta.    Patient reports dull ache in the middle of the chest for months.  She tells me that she had this all her life.  She works out and does not report chest discomfort with exercise.  Occasional palpitations.  No shortness of breath.  No lower extremity edema.      Patient underwent transthoracic echocardiogram at [facility] in March of this year which showed normal biventricular function, trileaflet aortic valve, aortic valve sclerosis and mild AI.  Ascending aorta reported at 3.9 cm.  Prior echo in 2020 showed similar findings except ascending aorta was 3.5 cm.    Current cardiac medications  Chlorthalidone 25 mg  Amlodipine 5 mg  Atenolol 50 mg daily    Patient is also on amitriptyline, inhalers and Symbicort.    Medications, personal, family, and social history reviewed with patient and revised.    Interval history 3/5/2024:  Patient is being seen today to discuss recent test results including CT calcium screening and echocardiogram.  Patient continues to report nonexertional fleeting chest discomfort (usually less than a minute).  She is exercising in the gym.  Denies exertional symptoms.  CT calcium screening showed elevated calcium score at 246.  When I saw her last time few months ago I increased amlodipine dose from 5 to 10 mg as her blood pressure was not well-controlled at that time.  She is telling me that she has noted well-controlled blood  pressure at home now with higher dose of amlodipine.  In clinic today blood pressure is normal as well.  She has lost some weight with exercise.  Patient's LDL is 191 a year ago.  She is scheduled for new lipid test.  I have discussed with the patient that she needs statins given high calcium score but she is completely against the idea of statins.  Transthoracic echocardiogram showed trace to mild AI with normal biventricular function.    PAST MEDICAL HISTORY:  Past Medical History:   Diagnosis Date    AR (allergic rhinitis)     Asthma     Persistant, mild    Elevated fasting glucose     GERD (gastroesophageal reflux disease)     High cholesterol     HTN (hypertension)     Incidental lung nodule 05/07    LSIL (low grade squamous intraepithelial lesion) on Pap smear     Migraine     Obesity (BMI 30-39.9)        CURRENT MEDICATIONS:  Current Outpatient Medications   Medication Sig Dispense Refill    albuterol (VENTOLIN HFA) 108 (90 Base) MCG/ACT inhaler INHALE 2 PUFFS BY MOUTH EVERY 4 HOURS AS NEEDED FOR SHORTNESS OF BREATH OR WHEEZING 18 g 3    amitriptyline (ELAVIL) 25 MG tablet Take 1 tablet (25 mg) by mouth At Bedtime 30 tablet 2    amLODIPine (NORVASC) 10 MG tablet Take 1 tablet (10 mg) by mouth daily 90 tablet 3    atenolol (TENORMIN) 50 MG tablet Take 1 tablet (50 mg) by mouth daily 90 tablet 1    budesonide-formoterol (SYMBICORT) 160-4.5 MCG/ACT Inhaler Inhale 2 puffs into the lungs 2 times daily 10.2 g 11    calcipotriene (DOVONOX) 0.005 % external cream Mix with fluorouracil and apply BID to affected areas for 5-10 days until desired result is achieved. Wash hands after applying. Avoid sun 60 g 1    chlorthalidone (HYGROTON) 25 MG tablet Take 1 tablet (25 mg) by mouth daily 90 tablet 1    fluorouracil (EFUDEX) 5 % external cream Mix with calcipotriene and apply BID to affected areas for 5-10 days. 40 g 1    MULTI-VITAMIN OR TABS 1 TABLET DAILY      potassium chloride ER (KLOR-CON M) 20 MEQ CR tablet TAKE 1  "TABLET BY MOUTH ONCE DAILY WITH A MEAL.         PAST SURGICAL HISTORY:  Past Surgical History:   Procedure Laterality Date    BIOPSY      THYROID , UTERUS, CERVIX    NO HISTORY OF SURGERY      Dated 10/12/10       ALLERGIES:     Allergies   Allergen Reactions    Sulfa Drugs [Sulfa Antibiotics] Hives    Polytrim [Polymyxin B-Trimethoprim]        FAMILY HISTORY:  Family History   Problem Relation Age of Onset    Neurologic Disorder Maternal Grandfather         parkinsons    Diabetes Paternal Grandfather          SOCIAL HISTORY:  Social History     Tobacco Use    Smoking status: Former     Packs/day: 0.00     Years: 0.00     Additional pack years: 0.00     Total pack years: 0.00     Types: Cigarettes     Quit date: 2008     Years since quittin.1    Smokeless tobacco: Never   Vaping Use    Vaping Use: Never used   Substance Use Topics    Alcohol use: Yes     Comment: 3 drinks a week    Drug use: No       ROS:   Constitutional: No fever, chills, or sweats. Weight stable.   Cardiovascular: As per HPI.       Exam:  /85   Pulse 77   Ht 1.727 m (5' 8\")   Wt 103.4 kg (228 lb)   SpO2 98%   BMI 34.67 kg/m    GENERAL APPEARANCE: alert and no distress  HEENT: no icterus, no central cyanosis  LYMPH/NECK: no adenopathy, no asymmetry, JVP not elevated  CARDIOVASCULAR: regular rhythm, normal S1, S2, no S3 or S4 and no murmur, click or rub, precordium quiet with normal PMI.  EXTREMITIES: no edema  NEURO: alert, normal speech,and affect  SKIN: no ecchymoses, no rashes     I have reviewed the labs and personally reviewed the imaging below and made my comment in the assessment and plan.    Labs:  CBC RESULTS:   Lab Results   Component Value Date    WBC 7.3 2022    WBC 5.2 2020    RBC 5.06 2022    RBC 4.84 2020    HGB 16.7 (H) 2022    HGB 15.4 2020    HCT 47.9 (H) 2022    HCT 45.4 2020    MCV 95 2022    MCV 94 2020    MCH 33.0 2022    MCH 31.8 2020 "    MCHC 34.9 11/21/2022    MCHC 33.9 03/07/2020    RDW 12.9 11/21/2022    RDW 12.8 03/07/2020     11/21/2022     03/07/2020       BMP RESULTS:  Lab Results   Component Value Date     05/10/2023     06/04/2021    POTASSIUM 3.8 05/10/2023    POTASSIUM 3.8 06/04/2021    CHLORIDE 98 05/10/2023    CHLORIDE 102 06/04/2021    CO2 32 05/10/2023    CO2 32 06/04/2021    ANIONGAP 7 05/10/2023    ANIONGAP 5 06/04/2021     (H) 05/10/2023     (H) 06/04/2021    BUN 14 05/10/2023    BUN 15 06/04/2021    CR 0.74 05/10/2023    CR 0.74 06/04/2021    GFRESTIMATED >90 05/10/2023    GFRESTIMATED >90 06/04/2021    GFRESTBLACK >90 06/04/2021    BRIELLE 9.3 05/10/2023    BRIELLE 9.5 06/04/2021   Transthoracic echocardiogram 12/29/2023:  Global and regional left ventricular function is normal with an EF of 55-60%.  Global right ventricular function is normal.  Trileaflet aortic valve sclerosis is present. Trace to mild aortic  insufficiency is present.  Pulmonary artery systolic pressure is normal.  Estimated mean right atrial pressure is normal.  No pericardial effusion is present.  CT calcium screening 12/28/2023: 246 placing the patient in the 90percentile.  Echocardiogram 3/15/2023 [facility]  Final Conclusion Previous Study: 09/23/2020    Visually Estimated EF: 55-60%    Normal left ventricular size. Normal left ventricular systolic function.    The right ventricle is normal in size and function.    Limited visualization.  Probably trileaflet aortic valve.  Aortic valve sclerosis without   significant stenosis. Mild aortic regurgitation.    There is trace tricuspid regurgitation. Estimated pulmonary artery pressure of 22.7 mmHg + RA   pressure.    Mildly dilated ascending aorta. 3.9 cm.    IVC not well visualized.    No significant change compared to the prior study.       Assessment and Plan:   Patient is being seen today to discuss test results including TTE and CT calcium screening.    # Severe  hyperlipidemia ( on 5/10/2023)  # Coronary artery disease based on elevated calcium score at 246 (patient in the 90 percentile)  -Patient reports nonexertional very short lasting chest discomfort for several years (almost 20 years). Discussed with the patient that if her symptoms become more noticeable to her we can plan for CTA.   -She is planning to have new lab tests with her primary care physician soon.  -She is a former smoker.    -I discussed with the patient that she would benefit from statins to reduce future cardiovascular risk.  Patient is reluctant and do not want to start statin.  I have discussed with her that she would otherwise she would benefit from PCSK9 inhibitors.  She would like to think about it.    -Start aspirin 81 mg    # Mild AI  # Mildly dilated ascending aorta at 3.9 cm  -Echocardiogram on 12/29/2023 showed normal biventricular function, trileaflet aortic valve sclerosis with trace to mild AI.  Ascending aorta is unchanged at 3.8 cm.  -Recheck aortic valve sclerosis in 3 years with transthoracic echocardiogram which can be done with primary care physician.    # Hypertension, well-controlled  -Patient on amlodipine 10 mg, atenolol 50 mg, chlorthalidone 25 mg.  -Discussed lifestyle changes including losing weight and cutting down on salt    I did not make any medication changes today.     Return to clinic as needed.      Total time spent today for this visit is 40 minutes including precharting, face-to-face clinic visit, review of labs/imaging and medical documentation.    Everardo SAMS MD  [facility] Division of Cardiology  Pager [phone]

## 2024-03-05 NOTE — LETTER
3/5/2024      RE: Pam Mora  [address] [facility] [address]       Dear Colleague,    Thank you for the opportunity to participate in the care of your patient, Pam Mora, at the [facility]. Please see a copy of my visit note below.        General Cardiology [facility]      Referring provider: Self-referred    HPI: Ms. Pam Mora is a 58 year old  female with PMH significant for    -Hypertension  -Morbid obesity  -Severe persistent asthma  -Elevated liver enzymes  -GERD  -Severe hyperlipidemia ( on 5/10/2023, patient not on statin)  -Former smoker (25 pack years, quit at the age of 48)    Patient is transferring her care from outside hospital.  She has been following with cardiology for mild aortic regurgitation.  She was recently found to have mildly dilated ascending aorta.    Patient reports dull ache in the middle of the chest for months.  She tells me that she had this all her life.  She works out and does not report chest discomfort with exercise.  Occasional palpitations.  No shortness of breath.  No lower extremity edema.      Patient underwent transthoracic echocardiogram at [facility] in March of this year which showed normal biventricular function, trileaflet aortic valve, aortic valve sclerosis and mild AI.  Ascending aorta reported at 3.9 cm.  Prior echo in 2020 showed similar findings except ascending aorta was 3.5 cm.    Current cardiac medications  Chlorthalidone 25 mg  Amlodipine 5 mg  Atenolol 50 mg daily    Patient is also on amitriptyline, inhalers and Symbicort.    Medications, personal, family, and social history reviewed with patient and revised.    Interval history 3/5/2024:  Patient is being seen today to discuss recent test results including CT calcium screening and echocardiogram.  Patient continues to report nonexertional fleeting chest discomfort  (usually less than a minute).  She is exercising in the gym.  Denies exertional symptoms.  CT calcium screening showed elevated calcium score at 246.  When I saw her last time few months ago I increased amlodipine dose from 5 to 10 mg as her blood pressure was not well-controlled at that time.  She is telling me that she has noted well-controlled blood pressure at home now with higher dose of amlodipine.  In clinic today blood pressure is normal as well.  She has lost some weight with exercise.  Patient's LDL is 191 a year ago.  She is scheduled for new lipid test.  I have discussed with the patient that she needs statins given high calcium score but she is completely against the idea of statins.  Transthoracic echocardiogram showed trace to mild AI with normal biventricular function.    PAST MEDICAL HISTORY:  Past Medical History:   Diagnosis Date    AR (allergic rhinitis)     Asthma     Persistant, mild    Elevated fasting glucose     GERD (gastroesophageal reflux disease)     High cholesterol     HTN (hypertension)     Incidental lung nodule 05/07    LSIL (low grade squamous intraepithelial lesion) on Pap smear     Migraine     Obesity (BMI 30-39.9)        CURRENT MEDICATIONS:  Current Outpatient Medications   Medication Sig Dispense Refill    albuterol (VENTOLIN HFA) 108 (90 Base) MCG/ACT inhaler INHALE 2 PUFFS BY MOUTH EVERY 4 HOURS AS NEEDED FOR SHORTNESS OF BREATH OR WHEEZING 18 g 3    amitriptyline (ELAVIL) 25 MG tablet Take 1 tablet (25 mg) by mouth At Bedtime 30 tablet 2    amLODIPine (NORVASC) 10 MG tablet Take 1 tablet (10 mg) by mouth daily 90 tablet 3    atenolol (TENORMIN) 50 MG tablet Take 1 tablet (50 mg) by mouth daily 90 tablet 1    budesonide-formoterol (SYMBICORT) 160-4.5 MCG/ACT Inhaler Inhale 2 puffs into the lungs 2 times daily 10.2 g 11    calcipotriene (DOVONOX) 0.005 % external cream Mix with fluorouracil and apply BID to affected areas for 5-10 days until desired result is achieved. Wash  "hands after applying. Avoid sun 60 g 1    chlorthalidone (HYGROTON) 25 MG tablet Take 1 tablet (25 mg) by mouth daily 90 tablet 1    fluorouracil (EFUDEX) 5 % external cream Mix with calcipotriene and apply BID to affected areas for 5-10 days. 40 g 1    MULTI-VITAMIN OR TABS 1 TABLET DAILY      potassium chloride ER (KLOR-CON M) 20 MEQ CR tablet TAKE 1 TABLET BY MOUTH ONCE DAILY WITH A MEAL.         PAST SURGICAL HISTORY:  Past Surgical History:   Procedure Laterality Date    BIOPSY      THYROID , UTERUS, CERVIX    NO HISTORY OF SURGERY      Dated 10/12/10       ALLERGIES:     Allergies   Allergen Reactions    Sulfa Drugs [Sulfa Antibiotics] Hives    Polytrim [Polymyxin B-Trimethoprim]        FAMILY HISTORY:  Family History   Problem Relation Age of Onset    Neurologic Disorder Maternal Grandfather         parkinsons    Diabetes Paternal Grandfather          SOCIAL HISTORY:  Social History     Tobacco Use    Smoking status: Former     Packs/day: 0.00     Years: 0.00     Additional pack years: 0.00     Total pack years: 0.00     Types: Cigarettes     Quit date: 2008     Years since quittin.1    Smokeless tobacco: Never   Vaping Use    Vaping Use: Never used   Substance Use Topics    Alcohol use: Yes     Comment: 3 drinks a week    Drug use: No       ROS:   Constitutional: No fever, chills, or sweats. Weight stable.   Cardiovascular: As per HPI.       Exam:  /85   Pulse 77   Ht 1.727 m (5' 8\")   Wt 103.4 kg (228 lb)   SpO2 98%   BMI 34.67 kg/m    GENERAL APPEARANCE: alert and no distress  HEENT: no icterus, no central cyanosis  LYMPH/NECK: no adenopathy, no asymmetry, JVP not elevated  CARDIOVASCULAR: regular rhythm, normal S1, S2, no S3 or S4 and no murmur, click or rub, precordium quiet with normal PMI.  EXTREMITIES: no edema  NEURO: alert, normal speech,and affect  SKIN: no ecchymoses, no rashes     I have reviewed the labs and personally reviewed the imaging below and made my comment in the " assessment and plan.    Labs:  CBC RESULTS:   Lab Results   Component Value Date    WBC 7.3 11/21/2022    WBC 5.2 03/07/2020    RBC 5.06 11/21/2022    RBC 4.84 03/07/2020    HGB 16.7 (H) 11/21/2022    HGB 15.4 03/07/2020    HCT 47.9 (H) 11/21/2022    HCT 45.4 03/07/2020    MCV 95 11/21/2022    MCV 94 03/07/2020    MCH 33.0 11/21/2022    MCH 31.8 03/07/2020    MCHC 34.9 11/21/2022    MCHC 33.9 03/07/2020    RDW 12.9 11/21/2022    RDW 12.8 03/07/2020     11/21/2022     03/07/2020       BMP RESULTS:  Lab Results   Component Value Date     05/10/2023     06/04/2021    POTASSIUM 3.8 05/10/2023    POTASSIUM 3.8 06/04/2021    CHLORIDE 98 05/10/2023    CHLORIDE 102 06/04/2021    CO2 32 05/10/2023    CO2 32 06/04/2021    ANIONGAP 7 05/10/2023    ANIONGAP 5 06/04/2021     (H) 05/10/2023     (H) 06/04/2021    BUN 14 05/10/2023    BUN 15 06/04/2021    CR 0.74 05/10/2023    CR 0.74 06/04/2021    GFRESTIMATED >90 05/10/2023    GFRESTIMATED >90 06/04/2021    GFRESTBLACK >90 06/04/2021    BRIELLE 9.3 05/10/2023    BRIELLE 9.5 06/04/2021   Transthoracic echocardiogram 12/29/2023:  Global and regional left ventricular function is normal with an EF of 55-60%.  Global right ventricular function is normal.  Trileaflet aortic valve sclerosis is present. Trace to mild aortic  insufficiency is present.  Pulmonary artery systolic pressure is normal.  Estimated mean right atrial pressure is normal.  No pericardial effusion is present.  CT calcium screening 12/28/2023: 246 placing the patient in the 90percentile.  Echocardiogram 3/15/2023 [facility]  Final Conclusion Previous Study: 09/23/2020    Visually Estimated EF: 55-60%    Normal left ventricular size. Normal left ventricular systolic function.    The right ventricle is normal in size and function.    Limited visualization.  Probably trileaflet aortic valve.  Aortic valve sclerosis without   significant stenosis. Mild aortic regurgitation.    There is  trace tricuspid regurgitation. Estimated pulmonary artery pressure of 22.7 mmHg + RA   pressure.    Mildly dilated ascending aorta. 3.9 cm.    IVC not well visualized.    No significant change compared to the prior study.       Assessment and Plan:   Patient is being seen today to discuss test results including TTE and CT calcium screening.    # Severe hyperlipidemia ( on 5/10/2023)  # Coronary artery disease based on elevated calcium score at 246 (patient in the 90 percentile)  -Patient reports nonexertional very short lasting chest discomfort for several years (almost 20 years). Discussed with the patient that if her symptoms become more noticeable to her we can plan for CTA.   -She is planning to have new lab tests with her primary care physician soon.  -She is a former smoker.    -I discussed with the patient that she would benefit from statins to reduce future cardiovascular risk.  Patient is reluctant and do not want to start statin.  I have discussed with her that she would otherwise she would benefit from PCSK9 inhibitors.  She would like to think about it.    -Start aspirin 81 mg    # Mild AI  # Mildly dilated ascending aorta at 3.9 cm  -Echocardiogram on 12/29/2023 showed normal biventricular function, trileaflet aortic valve sclerosis with trace to mild AI.  Ascending aorta is unchanged at 3.8 cm.  -Recheck aortic valve sclerosis in 3 years with transthoracic echocardiogram which can be done with primary care physician.    # Hypertension, well-controlled  -Patient on amlodipine 10 mg, atenolol 50 mg, chlorthalidone 25 mg.  -Discussed lifestyle changes including losing weight and cutting down on salt    I did not make any medication changes today.     Return to clinic as needed.      Total time spent today for this visit is 40 minutes including precharting, face-to-face clinic visit, review of labs/imaging and medical documentation.      Please do not hesitate to contact me if you have any  questions/concerns.     Sincerely,     Everardo Elias MD

## 2024-03-05 NOTE — PATIENT INSTRUCTIONS
Thank you for coming to the [facility] at [place]; please note the following instructions:    1. START: Aspirin 81 mg daily    2. Talk to primary care provider about doing another echo at some point in the future.     3. Call if your chest pain worsens or changes.     4. Follow up with Cardiology as needed. If you need further refills, please talk to your primary care provider. If you are having further cardiac related issues, we will be happy to see you.     5. Dr. Elias recommends Repatha Injection. Please let us know if you would like to start this. Link to website: [email or web address]        If you have any questions regarding your visit, please contact your care team:     CARDIOLOGY  TELEPHONE NUMBER   Tete MATOS., Registered Nurse  Maura MANLEY, Registered Nurse  Mag PRABHAKAR, Registered Nurse  Татьяна FORREST, Registered Medical Assistant  Debbie SOUTH, Certified Medical Assistant  Negra CAMPOS, Clinic Assistant [phone] (select option 1)    *After hours: [phone]   For Scheduling Appts:     [phone] (select option 1)    *After hours: [phone]   For the Device Clinic (Pacemakers and ICD's)  Alejandra YEN, Registered Nurse   During business hours: [phone]    *After business hours:  [phone] (select option 4)      Normal test result notifications will be released via [employer] or mailed within 7 business days.  All other test results, will be communicated via telephone once reviewed by your cardiologist.    If you need a medication refill, please contact your pharmacy.  Please allow 3 business days for your refill to be completed.    As always, thank you for trusting us with your health care needs!

## 2024-03-05 NOTE — NURSING NOTE
"Chief Complaint   Patient presents with    FU Cardiac testing       Initial /85   Pulse 77   Ht 1.727 m (5' 8\")   Wt 103.4 kg (228 lb)   SpO2 98%   BMI 34.67 kg/m   Estimated body mass index is 34.67 kg/m  as calculated from the following:    Height as of this encounter: 1.727 m (5' 8\").    Weight as of this encounter: 103.4 kg (228 lb)..  BP completed using cuff size: deondre Wang CMA (AAMA)    "

## 2024-03-22 ENCOUNTER — OFFICE VISIT (OUTPATIENT)
Dept: DERMATOLOGY | Facility: CLINIC | Age: 59
End: 2024-03-22
Payer: COMMERCIAL

## 2024-03-22 DIAGNOSIS — L57.0 AK (ACTINIC KERATOSIS): ICD-10-CM

## 2024-03-22 DIAGNOSIS — C44.529 SCC (SQUAMOUS CELL CARCINOMA), TRUNK: Primary | ICD-10-CM

## 2024-03-22 PROCEDURE — 17004 DESTROY PREMAL LESIONS 15/>: CPT | Performed by: NURSE PRACTITIONER

## 2024-03-22 PROCEDURE — 99212 OFFICE O/P EST SF 10 MIN: CPT | Mod: 25 | Performed by: NURSE PRACTITIONER

## 2024-03-22 NOTE — LETTER
3/22/2024         RE: Pam Mora  [address] [facility] [address]        Dear Colleague,    Thank you for referring your patient, Pam Mora, to the [facility]. Please see a copy of my visit note below.    [facility] Dermatology Note  Encounter Date: Mar 22, 2024  Office Visit     Reviewed patients past medical history and pertinent chart review prior to patients visit today.     Dermatology Problem List:  #1 history of biopsy showing atypical squamous proliferation on the mid chest, patient canceled the excision and would like to reschedule, referral placed 3/22/2024 for dermatology surgery  #2 history of SCC on the right fourth finger 10+ years ago per patient  #3 extensive sun damage, Efudex/calcipotriene use to treat the dorsum hands and chest winter 2023/24.  Cryotherapy as well    ____________________________________________    Assessment & Plan:     # Biopsy-proven atypical squamous proliferation biopsied 10/12/2023 from the mid chest.  Discussed that this is concerning for squamous cell carcinoma.  Discussed risks of surgery versus no surgery.  Patient would like to proceed with excision.  Dermatology surgery referral placed and patient will await call to schedule.    # Hypertrophic actinic keratosis, on the dorsum hands chest and lateral feet. Premalignant nature discussed with patient. Treatment options discussed with patient today including no treatment, topical treatment, and cryotherapy. Patient elects to treat visible lesions today with cryotherapy. After verbal consent and discussion of risks and benefits including but no limited to dyspigmentation/scar, blister, and pain. A total of 20 actinic keratoses were treated with 1-2mm freeze border for 2 cycle with liquid nitrogen. Post cryotherapy instructions were provided.     #Signs and Symptoms of non-melanoma skin cancer and ABCDEs of melanoma reviewed with patient. Patient  encouraged to perform monthly self skin exams and educated on how to perform them. UV precautions reviewed with patient. Patient was asked about new or changing moles/lesions on body.     #Reviewed Sunscreen: Apply 20 minutes prior to going outdoors and reapply every two hours, when wet or sweating. We recommend using an SPF 30 or higher, and to use one that is water resistant.       Follow-up: 6 months for a full body skin cancer screening, sooner as needed    Roxane Farrar CNP  Dermatology     ____________________________________________    CC: Derm Problem (Effudex 10/2023 with chest then hands ( 2-3 weeks )/Still areas remaining on hands /Discuss path report, )    HPI:  Ms. Pam Mora is a(n) 59 year old female who presents today as a follow-up for Efudex treatment.  She would like her chest hands and feet checked.  She recently treated her dorsum hands and the chest with Efudex.  She still thinks there are many thickened precancers in these areas.    Patient is otherwise feeling well, without additional skin concerns.     Physical Exam:  Vitals: There were no vitals taken for this visit.  SKIN: Chest hands and feet  -Many hypertrophic pink scaly papules on the chest dorsum hands and lateral feet.   -Scar on the mid chest, no signs of recurrent malignancies at this time    - No other lesions of concern on areas examined.     Medications:  Current Outpatient Medications   Medication     albuterol (VENTOLIN HFA) 108 (90 Base) MCG/ACT inhaler     amLODIPine (NORVASC) 10 MG tablet     aspirin (ASA) 81 MG chewable tablet     atenolol (TENORMIN) 50 MG tablet     budesonide-formoterol (SYMBICORT) 160-4.5 MCG/ACT Inhaler     calcipotriene (DOVONOX) 0.005 % external cream     chlorthalidone (HYGROTON) 25 MG tablet     fluorouracil (EFUDEX) 5 % external cream     MULTI-VITAMIN OR TABS     potassium chloride ER (KLOR-CON M) 20 MEQ CR tablet     No current facility-administered medications for this visit.      Past  Medical History:   Patient Active Problem List   Diagnosis     Rhinitis, allergic seasonal     GERD (gastroesophageal reflux disease)     Incidental lung nodule     Hypertension goal BP (blood pressure) < 140/90     Migraines     Abnormal thyroid scan     Menopause     Elevated liver enzymes     Severe persistent asthma without complication (H28)     Dyslipidemia     Prediabetes     Morbid obesity (H)     Abnormal ultrasound of endometrium     Abdominal bloating     Abdominal distension, gaseous     Benign neoplasm of descending colon     Chronic nonalcoholic liver disease     Mitral valve insufficiency     Nonrheumatic aortic valve insufficiency     Polyp of colon     Past Medical History:   Diagnosis Date     AR (allergic rhinitis)      Asthma     Persistant, mild     Elevated fasting glucose      GERD (gastroesophageal reflux disease)      High cholesterol      HTN (hypertension)      Incidental lung nodule 05/07     LSIL (low grade squamous intraepithelial lesion) on Pap smear      Migraine      Obesity (BMI 30-39.9)        CC No referring provider defined for this encounter. on close of this encounter.      Again, thank you for allowing me to participate in the care of your patient.        Sincerely,        SONIA Larios CNP

## 2024-03-22 NOTE — PROGRESS NOTES
[facility] Dermatology Note  Encounter Date: Mar 22, 2024  Office Visit     Reviewed patients past medical history and pertinent chart review prior to patients visit today.     Dermatology Problem List:  #1 history of biopsy showing atypical squamous proliferation on the mid chest, patient canceled the excision and would like to reschedule, referral placed 3/22/2024 for dermatology surgery  #2 history of SCC on the right fourth finger 10+ years ago per patient  #3 extensive sun damage, Efudex/calcipotriene use to treat the dorsum hands and chest winter 2023/24.  Cryotherapy as well    ____________________________________________    Assessment & Plan:     # Biopsy-proven atypical squamous proliferation biopsied 10/12/2023 from the mid chest.  Discussed that this is concerning for squamous cell carcinoma.  Discussed risks of surgery versus no surgery.  Patient would like to proceed with excision.  Dermatology surgery referral placed and patient will await call to schedule.    # Hypertrophic actinic keratosis, on the dorsum hands chest and lateral feet. Premalignant nature discussed with patient. Treatment options discussed with patient today including no treatment, topical treatment, and cryotherapy. Patient elects to treat visible lesions today with cryotherapy. After verbal consent and discussion of risks and benefits including but no limited to dyspigmentation/scar, blister, and pain. A total of 20 actinic keratoses were treated with 1-2mm freeze border for 2 cycle with liquid nitrogen. Post cryotherapy instructions were provided.     #Signs and Symptoms of non-melanoma skin cancer and ABCDEs of melanoma reviewed with patient. Patient encouraged to perform monthly self skin exams and educated on how to perform them. UV precautions reviewed with patient. Patient was asked about new or changing moles/lesions on body.     #Reviewed Sunscreen: Apply 20 minutes prior to going outdoors and reapply  every two hours, when wet or sweating. We recommend using an SPF 30 or higher, and to use one that is water resistant.       Follow-up: 6 months for a full body skin cancer screening, sooner as needed    Roxane Farrar CNP  Dermatology     ____________________________________________    CC: Derm Problem (Effudex 10/2023 with chest then hands ( 2-3 weeks )/Still areas remaining on hands /Discuss path report, )    HPI:  Ms. Pam Mora is a(n) 59 year old female who presents today as a follow-up for Efudex treatment.  She would like her chest hands and feet checked.  She recently treated her dorsum hands and the chest with Efudex.  She still thinks there are many thickened precancers in these areas.    Patient is otherwise feeling well, without additional skin concerns.     Physical Exam:  Vitals: There were no vitals taken for this visit.  SKIN: Chest hands and feet  -Many hypertrophic pink scaly papules on the chest dorsum hands and lateral feet.   -Scar on the mid chest, no signs of recurrent malignancies at this time    - No other lesions of concern on areas examined.     Medications:  Current Outpatient Medications   Medication    albuterol (VENTOLIN HFA) 108 (90 Base) MCG/ACT inhaler    amLODIPine (NORVASC) 10 MG tablet    aspirin (ASA) 81 MG chewable tablet    atenolol (TENORMIN) 50 MG tablet    budesonide-formoterol (SYMBICORT) 160-4.5 MCG/ACT Inhaler    calcipotriene (DOVONOX) 0.005 % external cream    chlorthalidone (HYGROTON) 25 MG tablet    fluorouracil (EFUDEX) 5 % external cream    MULTI-VITAMIN OR TABS    potassium chloride ER (KLOR-CON M) 20 MEQ CR tablet     No current facility-administered medications for this visit.      Past Medical History:   Patient Active Problem List   Diagnosis    Rhinitis, allergic seasonal    GERD (gastroesophageal reflux disease)    Incidental lung nodule    Hypertension goal BP (blood pressure) < 140/90    Migraines    Abnormal thyroid scan    Menopause    Elevated  liver enzymes    Severe persistent asthma without complication (H28)    Dyslipidemia    Prediabetes    Morbid obesity (H)    Abnormal ultrasound of endometrium    Abdominal bloating    Abdominal distension, gaseous    Benign neoplasm of descending colon    Chronic nonalcoholic liver disease    Mitral valve insufficiency    Nonrheumatic aortic valve insufficiency    Polyp of colon     Past Medical History:   Diagnosis Date    AR (allergic rhinitis)     Asthma     Persistant, mild    Elevated fasting glucose     GERD (gastroesophageal reflux disease)     High cholesterol     HTN (hypertension)     Incidental lung nodule 05/07    LSIL (low grade squamous intraepithelial lesion) on Pap smear     Migraine     Obesity (BMI 30-39.9)        CC No referring provider defined for this encounter. on close of this encounter.

## 2024-03-27 ENCOUNTER — TELEPHONE (OUTPATIENT)
Dept: DERMATOLOGY | Facility: CLINIC | Age: 59
End: 2024-03-27
Payer: COMMERCIAL

## 2024-03-27 NOTE — TELEPHONE ENCOUNTER
Left patient a voicemail to schedule an excision for   atypical squamous proliferation on chest cannot rule out SCC        with Dr. Taylor. Provided my direct phone number.    Meera Varghese on 3/27/2024 at 9:37 AM

## 2024-04-02 NOTE — TELEPHONE ENCOUNTER
Left patient a voicemail to schedule an excision for   atypical squamous proliferation on chest cannot rule out SCC         with Dr. Taylor. Provided my direct phone number.        Meera Varghese, Procedure  4/2/2024 2:03 PM

## 2024-04-05 ENCOUNTER — MYC REFILL (OUTPATIENT)
Dept: FAMILY MEDICINE | Facility: CLINIC | Age: 59
End: 2024-04-05
Payer: COMMERCIAL

## 2024-04-05 ENCOUNTER — MYC MEDICAL ADVICE (OUTPATIENT)
Dept: FAMILY MEDICINE | Facility: CLINIC | Age: 59
End: 2024-04-05
Payer: COMMERCIAL

## 2024-04-05 DIAGNOSIS — J45.50 SEVERE PERSISTENT ASTHMA WITHOUT COMPLICATION (H): ICD-10-CM

## 2024-04-05 RX ORDER — BUDESONIDE AND FORMOTEROL FUMARATE DIHYDRATE 160; 4.5 UG/1; UG/1
2 AEROSOL RESPIRATORY (INHALATION) 2 TIMES DAILY
Qty: 10.2 G | Refills: 2 | Status: SHIPPED | OUTPATIENT
Start: 2024-04-05 | End: 2024-04-09

## 2024-04-05 RX ORDER — ALBUTEROL SULFATE 90 UG/1
AEROSOL, METERED RESPIRATORY (INHALATION)
Qty: 18 G | Refills: 2 | Status: SHIPPED | OUTPATIENT
Start: 2024-04-05

## 2024-04-05 RX ORDER — BUDESONIDE AND FORMOTEROL FUMARATE DIHYDRATE 160; 4.5 UG/1; UG/1
2 AEROSOL RESPIRATORY (INHALATION) 2 TIMES DAILY
Qty: 10.2 G | Refills: 11 | OUTPATIENT
Start: 2024-04-05

## 2024-04-05 RX ORDER — ALBUTEROL SULFATE 90 UG/1
AEROSOL, METERED RESPIRATORY (INHALATION)
Qty: 18 G | Refills: 3 | OUTPATIENT
Start: 2024-04-05

## 2024-04-05 NOTE — TELEPHONE ENCOUNTER
Left patient a voicemail to schedule an excision for   atypical squamous proliferation on chest cannot rule out SCC         with Dr. Taylor. Provided my direct phone number.            Meera Varghese, Procedure  4/5/2024 2:22 PM

## 2024-04-08 ENCOUNTER — TELEPHONE (OUTPATIENT)
Dept: INTERNAL MEDICINE | Facility: CLINIC | Age: 59
End: 2024-04-08
Payer: COMMERCIAL

## 2024-04-08 DIAGNOSIS — J45.50 SEVERE PERSISTENT ASTHMA WITHOUT COMPLICATION (H): Primary | ICD-10-CM

## 2024-04-08 NOTE — TELEPHONE ENCOUNTER
Fax message:   budesonide-formoterol (SYMBICORT) 160-4.5 MCG/ACT Inhaler   Not covered.    Plan alt: Wixela Inhub Aer 250/50. Please send if appropriate.

## 2024-04-09 DIAGNOSIS — J45.50 SEVERE PERSISTENT ASTHMA WITHOUT COMPLICATION (H): ICD-10-CM

## 2024-04-09 RX ORDER — FLUTICASONE PROPIONATE AND SALMETEROL 250; 50 UG/1; UG/1
1 POWDER RESPIRATORY (INHALATION) EVERY 12 HOURS
Qty: 60 EACH | Refills: 11 | Status: SHIPPED | OUTPATIENT
Start: 2024-04-09 | End: 2024-04-10

## 2024-04-09 NOTE — TELEPHONE ENCOUNTER
This writer attempted to contact patient on 04/09/24      Reason for call med message and left message.      If patient calls back:   Relay provider message below, Wixela inhaler sent to [facility] in [place] off of [facility]      Damaris Chang, CROWN, RN  [facility]

## 2024-04-09 NOTE — TELEPHONE ENCOUNTER
Pt returned call to clinic.   RN relayed provider's message below and also advised pt to call back to report any sx if she is not tolerating the new medication.  Pt indicates understanding of issues and agrees with the plan.    REED Diop, RN

## 2024-04-09 NOTE — TELEPHONE ENCOUNTER
I have been unable to reach this patient for scheduling their appt with Derm Surg.     I am canceling the referral from my WQ because I have hit the maximum number of attempts to contact them. I will reinstate the order and help them schedule if they return my messages.     Thank you,   Meera Varghese, Procedure  4/9/2024 12:41 PM

## 2024-04-09 NOTE — TELEPHONE ENCOUNTER
Please let patient know that Symbicort is not covered.  Switch to Wixela 1 puff twice daily. Jennifer Spring, CNP

## 2024-04-10 RX ORDER — FLUTICASONE PROPIONATE AND SALMETEROL 250; 50 UG/1; UG/1
1 POWDER RESPIRATORY (INHALATION) EVERY 12 HOURS
Qty: 180 EACH | Refills: 1 | Status: SHIPPED | OUTPATIENT
Start: 2024-04-10

## 2024-04-12 ENCOUNTER — ANCILLARY PROCEDURE (OUTPATIENT)
Dept: CT IMAGING | Facility: CLINIC | Age: 59
End: 2024-04-12
Attending: INTERNAL MEDICINE
Payer: COMMERCIAL

## 2024-04-12 DIAGNOSIS — R91.8 PULMONARY NODULES: ICD-10-CM

## 2024-04-12 PROCEDURE — 71250 CT THORAX DX C-: CPT | Mod: TC | Performed by: RADIOLOGY

## 2024-04-16 ENCOUNTER — VIRTUAL VISIT (OUTPATIENT)
Dept: SURGERY | Facility: CLINIC | Age: 59
End: 2024-04-16
Attending: INTERNAL MEDICINE
Payer: COMMERCIAL

## 2024-04-16 VITALS — BODY MASS INDEX: 34.56 KG/M2 | HEIGHT: 68 IN | WEIGHT: 228 LBS

## 2024-04-16 DIAGNOSIS — R91.1 INCIDENTAL LUNG NODULE: Primary | ICD-10-CM

## 2024-04-16 PROCEDURE — 99202 OFFICE O/P NEW SF 15 MIN: CPT | Mod: 95 | Performed by: CLINICAL NURSE SPECIALIST

## 2024-04-16 ASSESSMENT — PAIN SCALES - GENERAL: PAINLEVEL: NO PAIN (0)

## 2024-04-16 NOTE — LETTER
4/16/2024         RE: Pam Mora  [id number] [facility] [address]        Dear Colleague,    Thank you for referring your patient, Pam Mora, to the [facility]. Please see a copy of my visit note below.    Virtual Visit Details    Type of service:  Video Visit     Originating Location (pt. Location): Home    Distant Location (provider location):  Off-site  Platform used for Video Visit: [facility]    THORACIC SURGERY FOLLOW UP VISIT      I saw . Pam Mora in follow-up today. The clinical summary follows:     CHIEF COMPLAINT  Lung nodules  INTERVAL STUDIES  CT chest 04/12/2024:   1.  Since 12/28/2023, stable right upper lobe 14 x 13 mm dominant  nodule and slight decrease in an adjacent 10 x 6 mm branching nodular  opacity. Other satellite micronodules are stable. This remains  indeterminate, may be infectious or inflammatory. Continued follow-up  CT or PET-CT in 3 months can be considered.  2.  New small groundglass opacity posteriorly in the right upper lobe  may be inflammatory or atelectasis.    Past Medical History:   Diagnosis Date    AR (allergic rhinitis)     Asthma     Persistant, mild    Elevated fasting glucose     GERD (gastroesophageal reflux disease)     High cholesterol     HTN (hypertension)     Incidental lung nodule 05/07    LSIL (low grade squamous intraepithelial lesion) on Pap smear     Migraine     Obesity (BMI 30-39.9)       Past Surgical History:   Procedure Laterality Date    BIOPSY      THYROID , UTERUS, CERVIX    NO HISTORY OF SURGERY      Dated 10/12/10      Social History     Socioeconomic History    Marital status:      Spouse name: Terry    Number of children: 1    Years of education: 12    Highest education level: Not on file   Occupational History    Occupation:      Employer: [employer]   Tobacco Use    Smoking status: Former     Current packs/day: 0.00     Types: Cigarettes     Quit  date: 2008     Years since quittin.2     Passive exposure: Never    Smokeless tobacco: Never   Vaping Use    Vaping status: Never Used   Substance and Sexual Activity    Alcohol use: Yes     Comment: 3 drinks a week    Drug use: No    Sexual activity: Yes     Partners: Male     Birth control/protection: Surgical     Comment: -vasectomy   Other Topics Concern    Parent/sibling w/ CABG, MI or angioplasty before 65F 55M? No   Social History Narrative    Has a son, age 15 and 2 step-daughters     Social Determinants of Health     Financial Resource Strain: High Risk (2022)    [facility], [facility]    Financial Resource Strain     Difficulty of Paying Living Expenses: Not on file     Difficulty of Paying Living Expenses: Not on file   Food Insecurity: Not on file   Transportation Needs: Not on file   Physical Activity: Not on file   Stress: Not on file   Social Connections: Unknown (2022)    Received from [employer], [employer]    Social Connections     Frequency of Communication with Friends and Family: Not on file   Interpersonal Safety: Not on file   Housing Stability: Not on file      SUBJECTIVE  Pam is doing well. She denies recent illness, cough or shortness of breath.    IMPRESSION   59 year-old female with lung nodules.     We reviewed her most recent chest CT results and had a discussion about nodules in general (causes, incidence, etc).    PLAN  I spent 10 min on the date of the encounter in chart review, patient visit, review of tests, documentation and/or discussion with other providers about the issues documented above. I reviewed the plan as follows:  Follow up with me in 6 months with chest CT prior  Necessary Tests & Appointments: chest CT  All questions were answered and the patient and present family were in agreement with the  plan.  I appreciate the opportunity to participate in the care of your patient and will keep you updated.  Sincerely,      SONIA Dunbar CNS

## 2024-04-16 NOTE — PROGRESS NOTES
Virtual Visit Details    Type of service:  Video Visit     Originating Location (pt. Location): Home    Distant Location (provider location):  Off-site  Platform used for Video Visit: [facility]    THORACIC SURGERY FOLLOW UP VISIT      I saw Mrs. Pam Mora in follow-up today. The clinical summary follows:     CHIEF COMPLAINT  Lung nodules  INTERVAL STUDIES  CT chest 2024:   1.  Since 2023, stable right upper lobe 14 x 13 mm dominant  nodule and slight decrease in an adjacent 10 x 6 mm branching nodular  opacity. Other satellite micronodules are stable. This remains  indeterminate, may be infectious or inflammatory. Continued follow-up  CT or PET-CT in 3 months can be considered.  2.  New small groundglass opacity posteriorly in the right upper lobe  may be inflammatory or atelectasis.    Past Medical History:   Diagnosis Date    AR (allergic rhinitis)     Asthma     Persistant, mild    Elevated fasting glucose     GERD (gastroesophageal reflux disease)     High cholesterol     HTN (hypertension)     Incidental lung nodule     LSIL (low grade squamous intraepithelial lesion) on Pap smear     Migraine     Obesity (BMI 30-39.9)       Past Surgical History:   Procedure Laterality Date    BIOPSY      THYROID , UTERUS, CERVIX    NO HISTORY OF SURGERY      Dated 10/12/10      Social History     Socioeconomic History    Marital status:      Spouse name: Terry    Number of children: 1    Years of education: 12    Highest education level: Not on file   Occupational History    Occupation:      Employer: [employer]   Tobacco Use    Smoking status: Former     Current packs/day: 0.00     Types: Cigarettes     Quit date: 2008     Years since quittin.2     Passive exposure: Never    Smokeless tobacco: Never   Vaping Use    Vaping status: Never Used   Substance and Sexual Activity    Alcohol use: Yes     Comment: 3 drinks a week    Drug use: No    Sexual activity: Yes      Partners: Male     Birth control/protection: Surgical     Comment: -vasectomy   Other Topics Concern    Parent/sibling w/ CABG, MI or angioplasty before 65F 55M? No   Social History Narrative    Has a son, age 15 and 2 step-daughters     Social Determinants of Health     Financial Resource Strain: High Risk (1/1/2022)    [facility] [facility], [facility] "[employer] [facility]    Financial Resource Strain     Difficulty of Paying Living Expenses: Not on file     Difficulty of Paying Living Expenses: Not on file   Food Insecurity: Not on file   Transportation Needs: Not on file   Physical Activity: Not on file   Stress: Not on file   Social Connections: Unknown (1/1/2022)    Received from [employer] [facility], [employer] [facility]    Social Connections     Frequency of Communication with Friends and Family: Not on file   Interpersonal Safety: Not on file   Housing Stability: Not on file      SUBJECTIVE  Pam is doing well. She denies recent illness, cough or shortness of breath.    IMPRESSION   59 year-old female with lung nodules.     We reviewed her most recent chest CT results and had a discussion about nodules in general (causes, incidence, etc).    PLAN  I spent 10 min on the date of the encounter in chart review, patient visit, review of tests, documentation and/or discussion with other providers about the issues documented above. I reviewed the plan as follows:  Follow up with me in 6 months with chest CT prior  Necessary Tests & Appointments: chest CT  All questions were answered and the patient and present family were in agreement with the plan.  I appreciate the opportunity to participate in the care of your patient and will keep you updated.  Sincerely,

## 2024-04-16 NOTE — NURSING NOTE
Is the patient currently in the state of [place]? YES    Visit mode:VIDEO    If the visit is dropped, the patient can be reconnected by: VIDEO VISIT: Text to cell phone:   Telephone Information:   Mobile [phone]       Will anyone else be joining the visit? NO  (If patient encounters technical issues they should call [phone])    How would you like to obtain your AVS? MyChart    Are changes needed to the allergy or medication list? Pt stated no changes to allergies and Pt stated no med changes    Are refills needed on medications prescribed by this physician?     Reason for visit: MEL GARCIAF

## 2024-07-20 DIAGNOSIS — I10 HYPERTENSION GOAL BP (BLOOD PRESSURE) < 140/90: ICD-10-CM

## 2024-07-22 RX ORDER — ATENOLOL 50 MG/1
50 TABLET ORAL DAILY
Qty: 90 TABLET | Refills: 0 | Status: SHIPPED | OUTPATIENT
Start: 2024-07-22

## 2024-07-22 RX ORDER — CHLORTHALIDONE 25 MG/1
25 TABLET ORAL DAILY
Qty: 90 TABLET | Refills: 0 | Status: SHIPPED | OUTPATIENT
Start: 2024-07-22

## 2024-08-07 ENCOUNTER — TELEPHONE (OUTPATIENT)
Dept: INTERNAL MEDICINE | Facility: CLINIC | Age: 59
End: 2024-08-07
Payer: COMMERCIAL

## 2024-08-11 ENCOUNTER — HEALTH MAINTENANCE LETTER (OUTPATIENT)
Age: 59
End: 2024-08-11

## 2024-08-12 ENCOUNTER — TELEPHONE (OUTPATIENT)
Dept: INTERNAL MEDICINE | Facility: CLINIC | Age: 59
End: 2024-08-12
Payer: COMMERCIAL

## 2024-08-14 NOTE — PROCEDURE: BIOPSY BY SHAVE METHOD
08/14/24 10:05 AM    Patient contacted to bring Advance Directive, POLST, or Living Will document to next scheduled pcp visit.VBI Department spoke with patient/ caregiver.    Thank you.  CRISTHIAN CROFT MA  [facility]    
Silver Nitrate Text: The wound bed was treated with silver nitrate after the biopsy was performed.
Curettage Text: The wound bed was treated with curettage after the biopsy was performed.
Additional Anesthesia Volume In Cc (Will Not Render If 0): 0
Post-Care Instructions: I reviewed with the patient in detail post-care instructions. Patient is to keep the biopsy site dry overnight, and then apply bacitracin twice daily until healed. Patient may apply hydrogen peroxide soaks to remove any crusting.
Was A Bandage Applied: Yes
Bill 09100 For Specimen Handling/Conveyance To Laboratory?: no
Billing Type: Client Bill
Electrodesiccation And Curettage Text: The wound bed was treated with electrodesiccation and curettage after the biopsy was performed.
Anesthesia Type: 1% lidocaine with epinephrine
Electrodesiccation Text: The wound bed was treated with electrodesiccation after the biopsy was performed.
Wound Care: Petrolatum
Hemostasis: Drysol
Biopsy Type: H and E
Type Of Destruction Used: Curettage
Consent: Written consent was obtained and risks were reviewed including but not limited to scarring, infection, bleeding, scabbing, incomplete removal, nerve damage and allergy to anesthesia.
Cryotherapy Text: The wound bed was treated with cryotherapy after the biopsy was performed.
Detail Level: Detailed
Notification Instructions: Patient will be notified of biopsy results. However, patient instructed to call the office if not contacted within 2 weeks.
Biopsy Method: Dermablade
Dressing: bandage
Information: Selecting Yes will display possible errors in your note based on the variables you have selected. This validation is only offered as a suggestion for you. PLEASE NOTE THAT THE VALIDATION TEXT WILL BE REMOVED WHEN YOU FINALIZE YOUR NOTE. IF YOU WANT TO FAX A PRELIMINARY NOTE YOU WILL NEED TO TOGGLE THIS TO 'NO' IF YOU DO NOT WANT IT IN YOUR FAXED NOTE.
Anesthesia Volume In Cc (Will Not Render If 0): 0.5
Depth Of Biopsy: dermis

## 2024-09-06 ENCOUNTER — TELEPHONE (OUTPATIENT)
Dept: SURGERY | Facility: CLINIC | Age: 59
End: 2024-09-06
Payer: COMMERCIAL

## 2024-09-07 ENCOUNTER — TELEPHONE (OUTPATIENT)
Dept: SURGERY | Facility: CLINIC | Age: 59
End: 2024-09-07
Payer: COMMERCIAL

## 2024-09-17 ENCOUNTER — TRANSFERRED RECORDS (OUTPATIENT)
Dept: HEALTH INFORMATION MANAGEMENT | Facility: CLINIC | Age: 59
End: 2024-09-17
Payer: COMMERCIAL

## 2024-10-23 DIAGNOSIS — I10 HYPERTENSION GOAL BP (BLOOD PRESSURE) < 140/90: ICD-10-CM

## 2024-10-23 RX ORDER — CHLORTHALIDONE 25 MG/1
25 TABLET ORAL DAILY
Qty: 90 TABLET | Refills: 0 | Status: SHIPPED | OUTPATIENT
Start: 2024-10-23

## 2024-10-23 RX ORDER — ATENOLOL 50 MG/1
50 TABLET ORAL DAILY
Qty: 90 TABLET | Refills: 0 | Status: SHIPPED | OUTPATIENT
Start: 2024-10-23

## 2024-10-27 NOTE — TELEPHONE ENCOUNTER
RECORDS STATUS - ALL OTHER DIAGNOSIS     Last FIVE years CHEST imaging pushed to PACS from [facility]   RECORDS RECEIVED FROM: [facility]    Appt Date: TBD NN WQ    Action    Action Taken 1/3/2024 2:40pm KAYLEEN     I called [facility] [phone], opt 2- images will be pushed, reports will be faxed.         NOTES STATUS DETAILS   OFFICE NOTE from referring provider Epic 12/08/23: Dr. Mcgee Can   MEDICATION LIST [facility]    LABS     PATHOLOGY REPORTS     ANYTHING RELATED TO DIAGNOSIS Epic Most recent 05/10/23   IMAGING (NEED IMAGES & REPORT)     CT SCANS PACS PACS;  01/09/24-11/05/13: CT Chest    [facility]:  12/28/2023: CT Cardiac Calcium screening   Xray Chest PACS 11/21/22-10/22/14: XR Chest       
Triceps.../Buccal...

## 2024-10-30 ENCOUNTER — ANCILLARY PROCEDURE (OUTPATIENT)
Dept: CT IMAGING | Facility: CLINIC | Age: 59
End: 2024-10-30
Attending: CLINICAL NURSE SPECIALIST
Payer: COMMERCIAL

## 2024-10-30 DIAGNOSIS — R91.1 INCIDENTAL LUNG NODULE: ICD-10-CM

## 2024-10-30 PROCEDURE — 71250 CT THORAX DX C-: CPT | Mod: TC | Performed by: RADIOLOGY

## 2024-11-06 ENCOUNTER — PATIENT OUTREACH (OUTPATIENT)
Dept: CARE COORDINATION | Facility: CLINIC | Age: 59
End: 2024-11-06
Payer: COMMERCIAL

## 2024-12-28 ENCOUNTER — TELEPHONE (OUTPATIENT)
Dept: CARDIOLOGY | Facility: CLINIC | Age: 59
End: 2024-12-28
Payer: COMMERCIAL

## 2024-12-28 DIAGNOSIS — I10 HYPERTENSION GOAL BP (BLOOD PRESSURE) < 140/90: ICD-10-CM

## 2025-01-02 RX ORDER — AMLODIPINE BESYLATE 10 MG/1
10 TABLET ORAL DAILY
Qty: 90 TABLET | Refills: 0 | Status: SHIPPED | OUTPATIENT
Start: 2025-01-02

## 2025-01-02 NOTE — TELEPHONE ENCOUNTER
AMLODIPINE BESYLATE 10MG TABLETS     Last Written Prescription Date:  12/20/23  Last Fill Quantity: 90,   # refills: 3  Last Office Visit :3/25/24  -Patient on amlodipine 10 mg, atenolol 50 mg, chlorthalidone 25 mg.   Future Office visit:  None CTA planned for 1/30/24  GFR Estimate   Date Value Ref Range Status   05/10/2023 >90 >60 mL/min/1.73m2 Final     Comment:     eGFR calculated using 2021 CKD-EPI equation.   06/04/2021 >90 >60 mL/min/[1.73_m2] Final     Comment:     Non  GFR Calc  Starting 12/18/2018, serum creatinine based estimated GFR (eGFR) will be   calculated using the Chronic Kidney Disease Epidemiology Collaboration   (CKD-EPI) equation.          Routing refill request to provider for review because:  GFR due(>90 on 5/10/23). [place] refill provided

## 2025-01-21 DIAGNOSIS — I10 HYPERTENSION GOAL BP (BLOOD PRESSURE) < 140/90: ICD-10-CM

## 2025-01-21 RX ORDER — CHLORTHALIDONE 25 MG/1
25 TABLET ORAL DAILY
Qty: 30 TABLET | Refills: 0 | Status: SHIPPED | OUTPATIENT
Start: 2025-01-21 | End: 2025-01-21

## 2025-01-21 RX ORDER — ATENOLOL 50 MG/1
50 TABLET ORAL DAILY
Qty: 30 TABLET | Refills: 0 | Status: SHIPPED | OUTPATIENT
Start: 2025-01-21 | End: 2025-01-21

## 2025-01-21 RX ORDER — ATENOLOL 50 MG/1
50 TABLET ORAL DAILY
Qty: 90 TABLET | Refills: 0 | Status: SHIPPED | OUTPATIENT
Start: 2025-01-21

## 2025-01-21 RX ORDER — CHLORTHALIDONE 25 MG/1
25 TABLET ORAL DAILY
Qty: 90 TABLET | Refills: 0 | Status: SHIPPED | OUTPATIENT
Start: 2025-01-21

## 2025-01-29 ENCOUNTER — OFFICE VISIT (OUTPATIENT)
Dept: FAMILY MEDICINE | Facility: CLINIC | Age: 60
End: 2025-01-29
Payer: COMMERCIAL

## 2025-01-29 ENCOUNTER — TELEPHONE (OUTPATIENT)
Dept: INTERNAL MEDICINE | Facility: CLINIC | Age: 60
End: 2025-01-29

## 2025-01-29 VITALS
TEMPERATURE: 98.1 F | OXYGEN SATURATION: 99 % | BODY MASS INDEX: 34.86 KG/M2 | HEIGHT: 68 IN | DIASTOLIC BLOOD PRESSURE: 80 MMHG | SYSTOLIC BLOOD PRESSURE: 139 MMHG | HEART RATE: 74 BPM | WEIGHT: 230 LBS

## 2025-01-29 DIAGNOSIS — R63.5 WEIGHT GAIN: ICD-10-CM

## 2025-01-29 DIAGNOSIS — R73.03 PREDIABETES: ICD-10-CM

## 2025-01-29 DIAGNOSIS — E66.09 CLASS 1 OBESITY DUE TO EXCESS CALORIES WITH SERIOUS COMORBIDITY AND BODY MASS INDEX (BMI) OF 34.0 TO 34.9 IN ADULT: Primary | ICD-10-CM

## 2025-01-29 DIAGNOSIS — E66.09 CLASS 1 OBESITY DUE TO EXCESS CALORIES WITH SERIOUS COMORBIDITY AND BODY MASS INDEX (BMI) OF 34.0 TO 34.9 IN ADULT: ICD-10-CM

## 2025-01-29 DIAGNOSIS — I10 HYPERTENSION GOAL BP (BLOOD PRESSURE) < 140/90: ICD-10-CM

## 2025-01-29 DIAGNOSIS — J45.50 SEVERE PERSISTENT ASTHMA WITHOUT COMPLICATION (H): ICD-10-CM

## 2025-01-29 DIAGNOSIS — E66.811 CLASS 1 OBESITY DUE TO EXCESS CALORIES WITH SERIOUS COMORBIDITY AND BODY MASS INDEX (BMI) OF 34.0 TO 34.9 IN ADULT: ICD-10-CM

## 2025-01-29 DIAGNOSIS — Z00.00 ROUTINE GENERAL MEDICAL EXAMINATION AT A HEALTH CARE FACILITY: Primary | ICD-10-CM

## 2025-01-29 DIAGNOSIS — E66.811 CLASS 1 OBESITY DUE TO EXCESS CALORIES WITH SERIOUS COMORBIDITY AND BODY MASS INDEX (BMI) OF 34.0 TO 34.9 IN ADULT: Primary | ICD-10-CM

## 2025-01-29 DIAGNOSIS — R60.9 DEPENDENT EDEMA: ICD-10-CM

## 2025-01-29 DIAGNOSIS — R93.1 ELEVATED CORONARY ARTERY CALCIUM SCORE: ICD-10-CM

## 2025-01-29 DIAGNOSIS — E78.5 DYSLIPIDEMIA: ICD-10-CM

## 2025-01-29 LAB
EST. AVERAGE GLUCOSE BLD GHB EST-MCNC: 123 MG/DL
HBA1C MFR BLD: 5.9 % (ref 0–5.6)

## 2025-01-29 PROCEDURE — 84443 ASSAY THYROID STIM HORMONE: CPT | Performed by: NURSE PRACTITIONER

## 2025-01-29 PROCEDURE — 80061 LIPID PANEL: CPT | Performed by: NURSE PRACTITIONER

## 2025-01-29 PROCEDURE — 36415 COLL VENOUS BLD VENIPUNCTURE: CPT | Performed by: NURSE PRACTITIONER

## 2025-01-29 PROCEDURE — 82533 TOTAL CORTISOL: CPT | Performed by: NURSE PRACTITIONER

## 2025-01-29 PROCEDURE — 80048 BASIC METABOLIC PNL TOTAL CA: CPT | Performed by: NURSE PRACTITIONER

## 2025-01-29 PROCEDURE — 83036 HEMOGLOBIN GLYCOSYLATED A1C: CPT | Performed by: NURSE PRACTITIONER

## 2025-01-29 RX ORDER — AMLODIPINE BESYLATE 10 MG/1
10 TABLET ORAL DAILY
Qty: 90 TABLET | Refills: 3 | Status: SHIPPED | OUTPATIENT
Start: 2025-01-29

## 2025-01-29 RX ORDER — ATENOLOL 50 MG/1
50 TABLET ORAL DAILY
Qty: 90 TABLET | Refills: 3 | Status: SHIPPED | OUTPATIENT
Start: 2025-01-29

## 2025-01-29 RX ORDER — POTASSIUM CHLORIDE 1500 MG/1
20 TABLET, EXTENDED RELEASE ORAL DAILY
Qty: 90 TABLET | Refills: 3 | Status: SHIPPED | OUTPATIENT
Start: 2025-01-29

## 2025-01-29 RX ORDER — CHLORTHALIDONE 25 MG/1
25 TABLET ORAL DAILY
Qty: 90 TABLET | Refills: 3 | Status: SHIPPED | OUTPATIENT
Start: 2025-01-29

## 2025-01-29 RX ORDER — ALBUTEROL SULFATE 90 UG/1
INHALANT RESPIRATORY (INHALATION)
Qty: 18 G | Refills: 2 | Status: SHIPPED | OUTPATIENT
Start: 2025-01-29

## 2025-01-29 SDOH — HEALTH STABILITY: PHYSICAL HEALTH: ON AVERAGE, HOW MANY MINUTES DO YOU ENGAGE IN EXERCISE AT THIS LEVEL?: 0 MIN

## 2025-01-29 SDOH — HEALTH STABILITY: PHYSICAL HEALTH: ON AVERAGE, HOW MANY DAYS PER WEEK DO YOU ENGAGE IN MODERATE TO STRENUOUS EXERCISE (LIKE A BRISK WALK)?: 2 DAYS

## 2025-01-29 ASSESSMENT — ASTHMA QUESTIONNAIRES
ACT_TOTALSCORE: 24
QUESTION_4 LAST FOUR WEEKS HOW OFTEN HAVE YOU USED YOUR RESCUE INHALER OR NEBULIZER MEDICATION (SUCH AS ALBUTEROL): ONCE A WEEK OR LESS
QUESTION_5 LAST FOUR WEEKS HOW WOULD YOU RATE YOUR ASTHMA CONTROL: COMPLETELY CONTROLLED
QUESTION_1 LAST FOUR WEEKS HOW MUCH OF THE TIME DID YOUR ASTHMA KEEP YOU FROM GETTING AS MUCH DONE AT WORK, SCHOOL OR AT HOME: NONE OF THE TIME
QUESTION_2 LAST FOUR WEEKS HOW OFTEN HAVE YOU HAD SHORTNESS OF BREATH: NOT AT ALL
QUESTION_3 LAST FOUR WEEKS HOW OFTEN DID YOUR ASTHMA SYMPTOMS (WHEEZING, COUGHING, SHORTNESS OF BREATH, CHEST TIGHTNESS OR PAIN) WAKE YOU UP AT NIGHT OR EARLIER THAN USUAL IN THE MORNING: NOT AT ALL
ACT_TOTALSCORE: 24

## 2025-01-29 ASSESSMENT — SOCIAL DETERMINANTS OF HEALTH (SDOH): HOW OFTEN DO YOU GET TOGETHER WITH FRIENDS OR RELATIVES?: ONCE A WEEK

## 2025-01-29 ASSESSMENT — PAIN SCALES - GENERAL: PAINLEVEL_OUTOF10: NO PAIN (0)

## 2025-01-29 NOTE — PATIENT INSTRUCTIONS
Start Zepbound 2.5 mg weekly x 4 weeks.  After that, increase to 5 mg weekly.  Follow-up in 3 months.        Edema-   Look for knee high graduated compression stockings that say they are 20-30 mmHg in strength.           Patient Education   Preventive Care Advice   This is general advice given by our system to help you stay healthy. However, your care team may have specific advice just for you. Please talk to your care team about your preventive care needs.  Nutrition  Eat 5 or more servings of fruits and vegetables each day.  Try wheat bread, brown rice and whole grain pasta (instead of white bread, rice, and pasta).  Get enough calcium and vitamin D. Check the label on foods and aim for 100% of the RDA (recommended daily allowance).  Lifestyle  Exercise at least 150 minutes each week  (30 minutes a day, 5 days a week).  Do muscle strengthening activities 2 days a week. These help control your weight and prevent disease.  No smoking.  Wear sunscreen to prevent skin cancer.  Have a dental exam and cleaning every 6 months.  Yearly exams  See your health care team every year to talk about:  Any changes in your health.  Any medicines your care team has prescribed.  Preventive care, family planning, and ways to prevent chronic diseases.  Shots (vaccines)   HPV shots (up to age 26), if you've never had them before.  Hepatitis B shots (up to age 59), if you've never had them before.  COVID-19 shot: Get this shot when it's due.  Flu shot: Get a flu shot every year.  Tetanus shot: Get a tetanus shot every 10 years.  Pneumococcal, hepatitis A, and RSV shots: Ask your care team if you need these based on your risk.  Shingles shot (for age 50 and up)  General health tests  Diabetes screening:  Starting at age 35, Get screened for diabetes at least every 3 years.  If you are younger than age 35, ask your care team if you should be screened for diabetes.  Cholesterol test: At age 39, start having a cholesterol test every 5  years, or more often if advised.  Bone density scan (DEXA): At age 50, ask your care team if you should have this scan for osteoporosis (brittle bones).  Hepatitis C: Get tested at least once in your life.  STIs (sexually transmitted infections)  Before age 24: Ask your care team if you should be screened for STIs.  After age 24: Get screened for STIs if you're at risk. You are at risk for STIs (including HIV) if:  You are sexually active with more than one person.  You don't use condoms every time.  You or a partner was diagnosed with a sexually transmitted infection.  If you are at risk for HIV, ask about PrEP medicine to prevent HIV.  Get tested for HIV at least once in your life, whether you are at risk for HIV or not.  Cancer screening tests  Cervical cancer screening: If you have a cervix, begin getting regular cervical cancer screening tests starting at age 21.  Breast cancer scan (mammogram): If you've ever had breasts, begin having regular mammograms starting at age 40. This is a scan to check for breast cancer.  Colon cancer screening: It is important to start screening for colon cancer at age 45.  Have a colonoscopy test every 10 years (or more often if you're at risk) Or, ask your provider about stool tests like a FIT test every year or Cologuard test every 3 years.  To learn more about your testing options, visit:   .  For help making a decision, visit:   https://bit.ly/rr22709.  Prostate cancer screening test: If you have a prostate, ask your care team if a prostate cancer screening test (PSA) at age 55 is right for you.  Lung cancer screening: If you are a current or former smoker ages 50 to 80, ask your care team if ongoing lung cancer screenings are right for you.  For informational purposes only. Not to replace the advice of your health care provider. Copyright   2023 [employer]. All rights reserved. Clinically reviewed by the [facility] Transitions Program. [employer] [id number] -  REV 01/24.

## 2025-01-29 NOTE — PROGRESS NOTES
Preventive Care Visit  [facility]  Jennifer Spring CNP,    Jan 29, 2025      Assessment & Plan     Routine general medical examination at a health care facility  Continue annual exams    Hypertension goal BP (blood pressure) < 140/90  Chronic, stable. Continue current mediations.   - amLODIPine (NORVASC) 10 MG tablet; Take 1 tablet (10 mg) by mouth daily.  - atenolol (TENORMIN) 50 MG tablet; Take 1 tablet (50 mg) by mouth daily.  - chlorthalidone (HYGROTON) 25 MG tablet; Take 1 tablet (25 mg) by mouth daily.  - potassium chloride ron ER (KLOR-CON M20) 20 MEQ CR tablet; Take 1 tablet (20 mEq) by mouth daily.  - BASIC METABOLIC PANEL    Elevated coronary artery calcium score  Following with cardiology.  They recommended a CTA coronary but she doesn't think she is going to go through with that.  They have recommended statin therapy but she feels strongly against statins.  Cardiology mentioned possibly treating with Repatha, she is going to follow-up with them.     Dyslipidemia  See above.  - Lipid panel reflex to direct LDL Fasting    Severe persistent asthma without complication (H)  Chronic, stable.  Continue albuterol prn.   - albuterol (VENTOLIN HFA) 108 (90 Base) MCG/ACT inhaler; INHALE 2 PUFFS BY MOUTH EVERY 4 HOURS AS NEEDED FOR SHORTNESS OF BREATH OR WHEEZING    Prediabetes  Labs in process  - BASIC METABOLIC PANEL  - Hemoglobin A1c    Class 1 obesity due to excess calories with serious comorbidity and body mass index (BMI) of 34.0 to 34.9 in adult  She is interested in medication for weight management.  States she has tried diet and exercise and lost some weight, but unable to lose anymore.  Discussed Zepbound in detail.  Follow-up in 3 months for recheck.   - tirzepatide-Weight Management (ZEPBOUND) 2.5 MG/0.5ML prefilled pen; Inject 0.5 mLs (2.5 mg) subcutaneously every 7 days.    Weight gain  Patient asks to have cortisol level checked.  She has no other symptoms of Cushings.   -  "Cortisol  - TSH with free T4 reflex    Dependent edema  Bilateral lower edema after sitting at her desk all day.  Not an issues on days when she can be more active.  She tries to get up and walk when possible.  Has compression stockings but they are not doing much. Discussed graduated compression stockings of 20-33 mmHg, suspect what she has is not tight enough, states they are not tight.  Elevate legs when able, stay active, work on weight loss.     Patient has been advised of split billing requirements and indicates understanding: Yes        BMI  Estimated body mass index is 34.97 kg/m  as calculated from the following:    Height as of this encounter: 1.727 m (5' 8\").    Weight as of this encounter: 104.3 kg (230 lb).   Weight management plan: lifestyle    Counseling  Appropriate preventive services were addressed with this patient via screening, questionnaire, or discussion as appropriate for fall prevention, nutrition, physical activity, Tobacco-use cessation, social engagement, weight loss and cognition.  Checklist reviewing preventive services available has been given to the patient.  Reviewed patient's diet, addressing concerns and/or questions.   She is at risk for lack of exercise and has been provided with information to increase physical activity for the benefit of her well-being.   The patient was instructed to see the dentist every 6 months.       Subjective   Pam is a 59 year old, presenting for the following:  Physical        1/29/2025     1:39 PM   Additional Questions   Roomed by erin          [facility]    Health Care Directive  Patient does not have a Health Care Directive      1/29/2025   General Health   How would you rate your overall physical health? Good   Feel stress (tense, anxious, or unable to sleep) Not at all         1/29/2025   Nutrition   Three or more servings of calcium each day? (!) NO   Diet: Regular (no restrictions)   How many servings of fruit and vegetables per day? (!) 2-3   How " many sweetened beverages each day? 0-1         2025   Exercise   Days per week of moderate/strenous exercise 2 days   Average minutes spent exercising at this level 0 min   (!) EXERCISE CONCERN      2025   Social Factors   Frequency of gathering with friends or relatives Once a week   Worry food won't last until get money to buy more No   Food not last or not have enough money for food? No   Do you have housing? (Housing is defined as stable permanent housing and does not include staying ouside in a car, in a tent, in an abandoned building, in an overnight shelter, or couch-surfing.) Yes   Are you worried about losing your housing? No   Lack of transportation? No   Unable to get utilities (heat,electricity)? No         2025   Fall Risk   Fallen 2 or more times in the past year? No   Trouble with walking or balance? No          2025   Dental   Dentist two times every year? (!) NO         2025   TB Screening   Were you born outside of the [place]? No         Today's PHQ-2 Score:       2025     1:36 PM   PHQ-2 (  [employer])   Q1: Little interest or pleasure in doing things 0   Q2: Feeling down, depressed or hopeless 0   PHQ-2 Score 0    Q1: Little interest or pleasure in doing things Not at all   Q2: Feeling down, depressed or hopeless Not at all   PHQ-2 Score 0       Patient-reported           2025   Substance Use   Alcohol more than 3/day or more than 7/wk No   Do you use any other substances recreationally? No     Social History     Tobacco Use    Smoking status: Former     Current packs/day: 0.00     Types: Cigarettes     Quit date: 2008     Years since quittin.0     Passive exposure: Never    Smokeless tobacco: Never   Vaping Use    Vaping status: Never Used   Substance Use Topics    Alcohol use: Yes     Comment: 3 drinks a week    Drug use: No     Mammogram Screening - Mammogram every 1-2 years updated in Health Maintenance based on mutual decision making        2025  "  STI Screening   New sexual partner(s) since last STI/HIV test? No     History of abnormal Pap smear: No - age 30- 64 PAP with HPV every 5 years recommended        Latest Ref Rng & Units 5/10/2023     8:20 AM 4/16/2018     7:50 AM 4/16/2018     7:47 AM   PAP / HPV   PAP  Negative for Intraepithelial Lesion or Malignancy (NILM)      PAP (Historical)    NIL    HPV 16 DNA Negative Negative  Negative     HPV 18 DNA Negative Negative  Negative     Other HR HPV Negative Negative  Negative       ASCVD Risk   The 10-year ASCVD risk score (Craig HER, et al., 2019) is: 4.8%    Values used to calculate the score:      Age: 59 years      Sex: Female      Is Non- : No      Diabetic: No      Tobacco smoker: No      Systolic Blood Pressure: 139 mmHg      Is BP treated: Yes      HDL Cholesterol: 79 mg/dL      Total Cholesterol: 283 mg/dL      Reviewed and updated as needed this visit by Provider   Tobacco  Allergies  Meds  Problems  Med Hx  Surg Hx  Fam Hx              Review of Systems  Constitutional, HEENT, cardiovascular, pulmonary, gi and gu systems are negative, except as otherwise noted.     Objective    Exam  /80 (BP Location: Left arm, Patient Position: Chair, Cuff Size: Adult Large)   Pulse 74   Temp 98.1  F (36.7  C) (Tympanic)   Ht 1.727 m (5' 8\")   Wt 104.3 kg (230 lb)   SpO2 99%   BMI 34.97 kg/m     Estimated body mass index is 34.97 kg/m  as calculated from the following:    Height as of this encounter: 1.727 m (5' 8\").    Weight as of this encounter: 104.3 kg (230 lb).    Physical Exam  GENERAL: alert and no distress  EYES: Eyes grossly normal to inspection, PERRL and conjunctivae and sclerae normal  HENT: ear canals and TM's normal, nose and mouth without ulcers or lesions  NECK: no adenopathy, no asymmetry, masses, or scars  RESP: lungs clear to auscultation - no rales, rhonchi or wheezes  CV: regular rate and rhythm, normal S1 S2, no S3 or S4, no murmur, click " or rub, no peripheral edema  ABDOMEN: soft, nontender, no hepatosplenomegaly, no masses and bowel sounds normal  MS: no gross musculoskeletal defects noted, no edema  SKIN: no suspicious lesions or rashes  NEURO: Normal strength and tone, mentation intact and speech normal  PSYCH: mentation appears normal, affect normal/bright    The longitudinal plan of care for the diagnosis(es)/condition(s) as documented were addressed during this visit. Due to the added complexity in care, I will continue to support Pam in the subsequent management and with ongoing continuity of care.       Signed Electronically by: Jennifer Spring, CNP

## 2025-01-29 NOTE — TELEPHONE ENCOUNTER
PRIOR AUTHORIZATION DENIED    Medication: ZEPBOUND 2.5 MG/0.5ML SC SOAJ  Insurance Company: [employer] - Phone [phone] Fax [phone]  Denial Date: 1/29/2025  Denial Reason(s): Must show documentation that pt has participated for at least 6 months in a comprehensive weight loss/management program      Appeal Information:       Patient Notified: No

## 2025-01-30 ENCOUNTER — MYC MEDICAL ADVICE (OUTPATIENT)
Dept: FAMILY MEDICINE | Facility: CLINIC | Age: 60
End: 2025-01-30
Payer: COMMERCIAL

## 2025-01-30 DIAGNOSIS — I25.10 CORONARY ARTERY CALCIFICATION: ICD-10-CM

## 2025-01-30 DIAGNOSIS — E78.5 DYSLIPIDEMIA: Primary | ICD-10-CM

## 2025-01-30 PROBLEM — E66.811 CLASS 1 OBESITY DUE TO EXCESS CALORIES WITH SERIOUS COMORBIDITY AND BODY MASS INDEX (BMI) OF 34.0 TO 34.9 IN ADULT: Status: ACTIVE | Noted: 2025-01-30

## 2025-01-30 PROBLEM — E66.09 CLASS 1 OBESITY DUE TO EXCESS CALORIES WITH SERIOUS COMORBIDITY AND BODY MASS INDEX (BMI) OF 34.0 TO 34.9 IN ADULT: Status: ACTIVE | Noted: 2025-01-30

## 2025-01-30 PROBLEM — E66.01 MORBID OBESITY (H): Status: RESOLVED | Noted: 2021-02-10 | Resolved: 2025-01-30

## 2025-01-30 LAB
ANION GAP SERPL CALCULATED.3IONS-SCNC: 14 MMOL/L (ref 7–15)
BUN SERPL-MCNC: 17 MG/DL (ref 8–23)
CALCIUM SERPL-MCNC: 9.8 MG/DL (ref 8.8–10.4)
CHLORIDE SERPL-SCNC: 97 MMOL/L (ref 98–107)
CHOLEST SERPL-MCNC: 283 MG/DL
CORTIS SERPL-MCNC: 7.4 UG/DL
CREAT SERPL-MCNC: 0.75 MG/DL (ref 0.51–0.95)
EGFRCR SERPLBLD CKD-EPI 2021: >90 ML/MIN/1.73M2
FASTING STATUS PATIENT QL REPORTED: YES
FASTING STATUS PATIENT QL REPORTED: YES
GLUCOSE SERPL-MCNC: 108 MG/DL (ref 70–99)
HCO3 SERPL-SCNC: 27 MMOL/L (ref 22–29)
HDLC SERPL-MCNC: 79 MG/DL
LDLC SERPL CALC-MCNC: 171 MG/DL
NONHDLC SERPL-MCNC: 204 MG/DL
POTASSIUM SERPL-SCNC: 3.3 MMOL/L (ref 3.4–5.3)
SODIUM SERPL-SCNC: 138 MMOL/L (ref 135–145)
TRIGL SERPL-MCNC: 164 MG/DL
TSH SERPL DL<=0.005 MIU/L-ACNC: 2.09 UIU/ML (ref 0.3–4.2)

## 2025-01-30 RX ORDER — ATORVASTATIN CALCIUM 40 MG/1
40 TABLET, FILM COATED ORAL DAILY
Qty: 90 TABLET | Refills: 3 | Status: SHIPPED | OUTPATIENT
Start: 2025-01-30

## 2025-01-30 NOTE — TELEPHONE ENCOUNTER
Can you please let patient know that her insurance is saying they only cover the weight loss med if she has been in a weight management program for 6 months. I can put in a referral to weight management with [facility] if she is interested in that. Jennifer Spring, CNP

## 2025-01-30 NOTE — TELEPHONE ENCOUNTER
Patient's response to [employer] message-OK PLEASE DO   THANKS SANDRA Almaraz [facility]

## 2025-03-25 DIAGNOSIS — R91.1 INCIDENTAL LUNG NODULE: Primary | ICD-10-CM

## 2025-04-08 ENCOUNTER — TRANSFERRED RECORDS (OUTPATIENT)
Dept: HEALTH INFORMATION MANAGEMENT | Facility: CLINIC | Age: 60
End: 2025-04-08
Payer: COMMERCIAL

## 2025-04-08 ENCOUNTER — TELEPHONE (OUTPATIENT)
Dept: PULMONOLOGY | Facility: CLINIC | Age: 60
End: 2025-04-08
Payer: COMMERCIAL

## 2025-04-25 DIAGNOSIS — J45.50 SEVERE PERSISTENT ASTHMA WITHOUT COMPLICATION (H): ICD-10-CM

## 2025-04-28 RX ORDER — ALBUTEROL SULFATE 90 UG/1
INHALANT RESPIRATORY (INHALATION)
Qty: 26 G | Refills: 0 | Status: SHIPPED | OUTPATIENT
Start: 2025-04-28

## 2025-07-24 ENCOUNTER — OFFICE VISIT (OUTPATIENT)
Dept: FAMILY MEDICINE | Facility: CLINIC | Age: 60
End: 2025-07-24
Payer: COMMERCIAL

## 2025-07-24 VITALS
OXYGEN SATURATION: 98 % | SYSTOLIC BLOOD PRESSURE: 120 MMHG | BODY MASS INDEX: 34.97 KG/M2 | DIASTOLIC BLOOD PRESSURE: 80 MMHG | HEART RATE: 71 BPM | TEMPERATURE: 97.5 F | HEIGHT: 68 IN

## 2025-07-24 DIAGNOSIS — R73.03 PREDIABETES: ICD-10-CM

## 2025-07-24 DIAGNOSIS — E78.5 DYSLIPIDEMIA: ICD-10-CM

## 2025-07-24 DIAGNOSIS — K76.0 FATTY LIVER: ICD-10-CM

## 2025-07-24 DIAGNOSIS — E66.811 CLASS 1 OBESITY DUE TO EXCESS CALORIES WITH SERIOUS COMORBIDITY AND BODY MASS INDEX (BMI) OF 34.0 TO 34.9 IN ADULT: Primary | ICD-10-CM

## 2025-07-24 DIAGNOSIS — I10 HYPERTENSION GOAL BP (BLOOD PRESSURE) < 140/90: ICD-10-CM

## 2025-07-24 DIAGNOSIS — E66.09 CLASS 1 OBESITY DUE TO EXCESS CALORIES WITH SERIOUS COMORBIDITY AND BODY MASS INDEX (BMI) OF 34.0 TO 34.9 IN ADULT: Primary | ICD-10-CM

## 2025-07-24 ASSESSMENT — ASTHMA QUESTIONNAIRES
QUESTION_4 LAST FOUR WEEKS HOW OFTEN HAVE YOU USED YOUR RESCUE INHALER OR NEBULIZER MEDICATION (SUCH AS ALBUTEROL): ONCE A WEEK OR LESS
QUESTION_1 LAST FOUR WEEKS HOW MUCH OF THE TIME DID YOUR ASTHMA KEEP YOU FROM GETTING AS MUCH DONE AT WORK, SCHOOL OR AT HOME: NONE OF THE TIME
QUESTION_2 LAST FOUR WEEKS HOW OFTEN HAVE YOU HAD SHORTNESS OF BREATH: NOT AT ALL
QUESTION_5 LAST FOUR WEEKS HOW WOULD YOU RATE YOUR ASTHMA CONTROL: COMPLETELY CONTROLLED
ACT_TOTALSCORE: 24
QUESTION_3 LAST FOUR WEEKS HOW OFTEN DID YOUR ASTHMA SYMPTOMS (WHEEZING, COUGHING, SHORTNESS OF BREATH, CHEST TIGHTNESS OR PAIN) WAKE YOU UP AT NIGHT OR EARLIER THAN USUAL IN THE MORNING: NOT AT ALL

## 2025-07-24 ASSESSMENT — PAIN SCALES - GENERAL: PAINLEVEL_OUTOF10: NO PAIN (0)

## 2025-07-24 NOTE — PATIENT INSTRUCTIONS
If denied we will do a prior authorization and if that is denied then I will write an appeal letter.

## 2025-07-24 NOTE — PROGRESS NOTES
"  Assessment & Plan     Class 1 obesity due to excess calories with serious comorbidity and body mass index (BMI) of 34.0 to 34.9 in adult  Fatty liver  Dyslipidemia  Prediabetes  Hypertension goal BP (blood pressure) < 140/90  Patient with obesity and several comorbid conditions.  She has gone to weight management in the past, 2013.  Has been trying consistent exercise for months as well as various diets and has not been able to lose any weight.  She is interested in GLP-1 medication.  Insurance denied in the past, she would like us to submit again.  If PA denied, I'll write appeal letter.  Discussed Wegovy in detail.  If she is able to start it, will follow-up in 3 months.  Discussed if not covered, she could consider paying out of pocket if that is feasible for her.    - semaglutide-weight management (WEGOVY) 0.25 MG/0.5ML pen; Inject 0.5 mLs (0.25 mg) subcutaneously once a week.  - Semaglutide-Weight Management (WEGOVY) 0.5 MG/0.5ML pen; Inject 0.5 mg subcutaneously once a week.  - Comprehensive metabolic panel (BMP + Alb, Alk Phos, ALT, AST, Total. Bili, TP); Future  - Hemoglobin A1c; Future      BMI  Estimated body mass index is 34.97 kg/m  as calculated from the following:    Height as of this encounter: 1.727 m (5' 8\").    Weight as of 4/29/25: 104.3 kg (230 lb).       Subjective   Pam is a 60 year old, presenting for the following health issues:  Weight Problem        7/24/2025     6:53 AM   Additional Questions   Roomed by erin         7/24/2025   Declines Weight   Did patient decline having their weight taken? Yes     History of Present Illness       Reason for visit:  Discuss weight loss medicatons   She is taking medications regularly.          Review of Systems  Constitutional, HEENT, cardiovascular, pulmonary, gi and gu systems are negative, except as otherwise noted.      Objective    /80   Pulse 71   Temp 97.5  F (36.4  C)   Ht 1.727 m (5' 8\")   SpO2 98%   BMI 34.97 kg/m    Body mass " index is 34.97 kg/m .  Physical Exam   GENERAL: alert and no distress  EYES: Eyes grossly normal to inspection, PERRL and conjunctivae and sclerae normal  RESP: breathing unlabored  MS: no gross musculoskeletal defects noted, no edema  SKIN: no suspicious lesions or rashes  NEURO: Normal strength and tone, mentation intact and speech normal  PSYCH: mentation appears normal, affect normal/bright    Recent Labs   Lab Test 01/29/25  1420 05/10/23  0829    137   POTASSIUM 3.3* 3.8   CHLORIDE 97* 98   CO2 27 32   ANIONGAP 14 7   * 147*   BUN 17.0 14   CR 0.75 0.74   BRIELLE 9.8 9.3     Recent Labs   Lab Test 01/29/25  1420 05/10/23  0829   CHOL 283* 296*   HDL 79 81   * 191*   TRIG 164* 122     Liver Function Studies -   Recent Labs   Lab Test 05/10/23  0829   PROTTOTAL 8.1   ALBUMIN 3.9   BILITOTAL 1.3   ALKPHOS 87   AST 56*   ALT 78*     Lab Results   Component Value Date    A1C 5.9 01/29/2025    A1C 6.1 05/10/2023    A1C 5.6 10/16/2021    A1C 6.1 03/03/2021    A1C 5.5 03/21/2019    A1C 5.6 02/23/2018    A1C 5.3 05/15/2013     The longitudinal plan of care for the diagnosis(es)/condition(s) as documented were addressed during this visit. Due to the added complexity in care, I will continue to support Pam in the subsequent management and with ongoing continuity of care.           Signed Electronically by: Jennifer Spring CNP

## 2025-08-13 ENCOUNTER — MYC MEDICAL ADVICE (OUTPATIENT)
Dept: FAMILY MEDICINE | Facility: CLINIC | Age: 60
End: 2025-08-13
Payer: COMMERCIAL